# Patient Record
Sex: MALE | URBAN - METROPOLITAN AREA
[De-identification: names, ages, dates, MRNs, and addresses within clinical notes are randomized per-mention and may not be internally consistent; named-entity substitution may affect disease eponyms.]

---

## 2018-11-30 ENCOUNTER — APPOINTMENT (RX ONLY)
Dept: URBAN - METROPOLITAN AREA CLINIC 35 | Facility: CLINIC | Age: 58
Setting detail: DERMATOLOGY
End: 2018-11-30

## 2018-11-30 DIAGNOSIS — D18.0 HEMANGIOMA: ICD-10-CM

## 2018-11-30 DIAGNOSIS — L57.0 ACTINIC KERATOSIS: ICD-10-CM

## 2018-11-30 DIAGNOSIS — Z71.89 OTHER SPECIFIED COUNSELING: ICD-10-CM

## 2018-11-30 DIAGNOSIS — L82.1 OTHER SEBORRHEIC KERATOSIS: ICD-10-CM

## 2018-11-30 DIAGNOSIS — L81.4 OTHER MELANIN HYPERPIGMENTATION: ICD-10-CM

## 2018-11-30 DIAGNOSIS — D22 MELANOCYTIC NEVI: ICD-10-CM

## 2018-11-30 PROBLEM — D18.01 HEMANGIOMA OF SKIN AND SUBCUTANEOUS TISSUE: Status: ACTIVE | Noted: 2018-11-30

## 2018-11-30 PROBLEM — J30.1 ALLERGIC RHINITIS DUE TO POLLEN: Status: ACTIVE | Noted: 2018-11-30

## 2018-11-30 PROBLEM — D22.61 MELANOCYTIC NEVI OF RIGHT UPPER LIMB, INCLUDING SHOULDER: Status: ACTIVE | Noted: 2018-11-30

## 2018-11-30 PROCEDURE — ? LIQUID NITROGEN

## 2018-11-30 PROCEDURE — ? COUNSELING

## 2018-11-30 PROCEDURE — 99213 OFFICE O/P EST LOW 20 MIN: CPT

## 2018-11-30 ASSESSMENT — LOCATION ZONE DERM
LOCATION ZONE: ARM
LOCATION ZONE: FACE

## 2018-11-30 ASSESSMENT — LOCATION DETAILED DESCRIPTION DERM
LOCATION DETAILED: LEFT CENTRAL EYEBROW
LOCATION DETAILED: RIGHT POSTERIOR SHOULDER
LOCATION DETAILED: RIGHT PROXIMAL LATERAL POSTERIOR UPPER ARM
LOCATION DETAILED: LEFT LATERAL FOREHEAD
LOCATION DETAILED: RIGHT SUPERIOR CENTRAL BUCCAL CHEEK
LOCATION DETAILED: RIGHT PROXIMAL POSTERIOR UPPER ARM

## 2018-11-30 ASSESSMENT — LOCATION SIMPLE DESCRIPTION DERM
LOCATION SIMPLE: LEFT EYEBROW
LOCATION SIMPLE: RIGHT SHOULDER
LOCATION SIMPLE: LEFT FOREHEAD
LOCATION SIMPLE: RIGHT UPPER ARM
LOCATION SIMPLE: RIGHT CHEEK

## 2018-11-30 NOTE — HPI: EVALUATION OF SKIN LESION(S)
How Severe Are Your Spot(S)?: mild
Have Your Spot(S) Been Treated In The Past?: has been treated
Hpi Title: Evaluation of Skin Lesions
When Was It Treated?: 11/2017
Family Member: Father, grandmother

## 2018-11-30 NOTE — PROCEDURE: LIQUID NITROGEN
Render Post-Care Instructions In Note?: no
Consent: The patient's consent was obtained including but not limited to risks of crusting, scabbing, blistering, scarring, darker or lighter pigmentary change, recurrence, incomplete removal and infection.
Post-Care Instructions: I reviewed with the patient in detail post-care instructions. Patient is to wear sunprotection, and avoid picking at any of the treated lesions. Pt may apply Vaseline to crusted or scabbing areas.
Detail Level: Detailed
Duration Of Freeze Thaw-Cycle (Seconds): 10

## 2019-12-02 ENCOUNTER — RX ONLY (OUTPATIENT)
Age: 59
Setting detail: RX ONLY
End: 2019-12-02

## 2019-12-02 ENCOUNTER — APPOINTMENT (RX ONLY)
Dept: URBAN - METROPOLITAN AREA CLINIC 35 | Facility: CLINIC | Age: 59
Setting detail: DERMATOLOGY
End: 2019-12-02

## 2019-12-02 DIAGNOSIS — L81.4 OTHER MELANIN HYPERPIGMENTATION: ICD-10-CM

## 2019-12-02 DIAGNOSIS — D485 NEOPLASM OF UNCERTAIN BEHAVIOR OF SKIN: ICD-10-CM

## 2019-12-02 DIAGNOSIS — L82.1 OTHER SEBORRHEIC KERATOSIS: ICD-10-CM

## 2019-12-02 DIAGNOSIS — L57.0 ACTINIC KERATOSIS: ICD-10-CM

## 2019-12-02 DIAGNOSIS — Z71.89 OTHER SPECIFIED COUNSELING: ICD-10-CM

## 2019-12-02 DIAGNOSIS — D22 MELANOCYTIC NEVI: ICD-10-CM

## 2019-12-02 PROBLEM — D48.5 NEOPLASM OF UNCERTAIN BEHAVIOR OF SKIN: Status: ACTIVE | Noted: 2019-12-02

## 2019-12-02 PROBLEM — D22.5 MELANOCYTIC NEVI OF TRUNK: Status: ACTIVE | Noted: 2019-12-02

## 2019-12-02 PROCEDURE — 99213 OFFICE O/P EST LOW 20 MIN: CPT | Mod: 25

## 2019-12-02 PROCEDURE — 11102 TANGNTL BX SKIN SINGLE LES: CPT

## 2019-12-02 PROCEDURE — ? BIOPSY BY SHAVE METHOD

## 2019-12-02 PROCEDURE — ? COUNSELING

## 2019-12-02 PROCEDURE — ? TREATMENT REGIMEN

## 2019-12-02 PROCEDURE — ? PRESCRIPTION

## 2019-12-02 RX ORDER — FLUOROURACIL 5 MG/G
THIN LAYER CREAM TOPICAL BID
Qty: 1 | Refills: 0 | Status: ERX

## 2019-12-02 RX ORDER — CALCIPOTRIENE 0.05 MG/G
OINTMENT TOPICAL BID
Qty: 1 | Refills: 0 | Status: ERX

## 2019-12-02 ASSESSMENT — LOCATION SIMPLE DESCRIPTION DERM
LOCATION SIMPLE: LOWER BACK
LOCATION SIMPLE: RIGHT PRETIBIAL REGION
LOCATION SIMPLE: LEFT UPPER BACK
LOCATION SIMPLE: LEFT SHOULDER
LOCATION SIMPLE: LEFT PRETIBIAL REGION
LOCATION SIMPLE: LEFT CHEEK
LOCATION SIMPLE: LEFT FOREARM
LOCATION SIMPLE: RIGHT CHEEK
LOCATION SIMPLE: RIGHT SHOULDER
LOCATION SIMPLE: ABDOMEN
LOCATION SIMPLE: RIGHT FOREARM

## 2019-12-02 ASSESSMENT — LOCATION DETAILED DESCRIPTION DERM
LOCATION DETAILED: LEFT CENTRAL MALAR CHEEK
LOCATION DETAILED: LEFT MEDIAL UPPER BACK
LOCATION DETAILED: RIGHT LATERAL SHOULDER
LOCATION DETAILED: RIGHT DISTAL PRETIBIAL REGION
LOCATION DETAILED: LEFT DISTAL PRETIBIAL REGION
LOCATION DETAILED: LEFT POSTERIOR SHOULDER
LOCATION DETAILED: LEFT PROXIMAL DORSAL FOREARM
LOCATION DETAILED: RIGHT PROXIMAL DORSAL FOREARM
LOCATION DETAILED: EPIGASTRIC SKIN
LOCATION DETAILED: SUPERIOR LUMBAR SPINE
LOCATION DETAILED: RIGHT LATERAL MALAR CHEEK
LOCATION DETAILED: RIGHT POSTERIOR SHOULDER

## 2019-12-02 ASSESSMENT — LOCATION ZONE DERM
LOCATION ZONE: ARM
LOCATION ZONE: FACE
LOCATION ZONE: LEG
LOCATION ZONE: TRUNK

## 2019-12-02 NOTE — PROCEDURE: TREATMENT REGIMEN
Initiate Treatment: Fluorouracil 5% BID on the face for 4 days then on forearms and back of the hands\\nCalcipotriene 0.005% ointment BID for 4 days on top of fluorouracil
Detail Level: Zone

## 2019-12-02 NOTE — PROCEDURE: BIOPSY BY SHAVE METHOD
Destruction After The Procedure: No
Post-Care Instructions: I reviewed with the patient in detail post-care instructions. Keep the biopsy site dry overnight, and then change the dressing once daily and apply a thin layer of petrolatum with a clean q-tip. \\nShowers are OK after 24 hours.  Allow clean water to run over the area.  Do not touch the biopsy site with your hands.  Do not immerse the biopsy site in water such as going into a swimming pool or bathtub until the sutures are removed.  If the biopsy site gets more painful with time, red, or drains, this is concerning for infection.  If you have signs of infection please call the office to come in for a walk in visit Monday through Friday 8:30 am to 12 pm or 1 pm to 4:30 pm.  If we are not in the office, please call through the answering service.
Size Of Lesion In Cm: 0.5
Anesthesia Type: 0.5% lidocaine with 1:200,000 epinephrine and a 1:10 solution of 8.4% sodium bicarbonate
Billing Type: Third-Party Bill
Curettage Text: The wound bed was treated with curettage after the biopsy was performed.
Lab: [id number]
Notification Instructions: Patient will be notified of biopsy results. However, patient instructed to call the office if not contacted within 2 weeks.
Biopsy Type: H and E
Electrodesiccation Text: The wound bed was treated with electrodesiccation after the biopsy was performed.
Depth Of Biopsy: dermis
Additional Anesthesia Volume In Cc (Will Not Render If 0): 0
Cryotherapy Text: The wound bed was treated with cryotherapy after the biopsy was performed.
Lab Facility: 
Wound Care: Petrolatum
Type Of Destruction Used: Curettage
Electrodesiccation And Curettage Text: The wound bed was treated with electrodesiccation and curettage after the biopsy was performed.
Was A Bandage Applied: Yes
Dressing: bandage
Consent: Written consent was obtained and risks were reviewed including but not limited to scarring, infection, bleeding, scabbing, incomplete removal, nerve damage and allergy to anesthesia.
Hemostasis: Aluminum Chloride
Silver Nitrate Text: The wound bed was treated with silver nitrate after the biopsy was performed.
Detail Level: Detailed
Biopsy Method: double edge Personna blade

## 2019-12-12 ENCOUNTER — APPOINTMENT (RX ONLY)
Dept: URBAN - METROPOLITAN AREA CLINIC 35 | Facility: CLINIC | Age: 59
Setting detail: DERMATOLOGY
End: 2019-12-12

## 2019-12-12 PROBLEM — C44.612 BASAL CELL CARCINOMA OF SKIN OF RIGHT UPPER LIMB, INCLUDING SHOULDER: Status: ACTIVE | Noted: 2019-12-12

## 2019-12-12 PROCEDURE — ? CURETTAGE AND DESTRUCTION

## 2019-12-12 PROCEDURE — 17262 DSTRJ MAL LES T/A/L 1.1-2.0: CPT

## 2019-12-12 NOTE — PROCEDURE: CURETTAGE AND DESTRUCTION
Anesthesia Volume In Cc: 1
Bill For Surgical Tray: no
Anesthesia Type: 1% lidocaine with epinephrine and a 1:10 solution of 8.4% sodium bicarbonate
Biopsy Photograph Reviewed: Yes
Size Of Lesion After Curettage: 1.2
Post-Care Instructions: I reviewed with the patient in detail post-care instructions. Keep the biopsy site dry overnight, and then change the dressing once daily and apply a thin layer of petrolatum with a clean q-tip. \\nShowers are OK after 24 hours.  Allow clean water to run over the area.  Do not touch the biopsy site with your hands.  Do not immerse the biopsy site in water such as going into a swimming pool or bathtub until the sutures are removed.  If the biopsy site gets more painful with time, red, or drains, this is concerning for infection.  If you have signs of infection please call the office to come in for a walk in visit Monday through Friday 8:30 am to 12 pm or 1 pm to 4:30 pm.  If we are not in the office, please call through the answering service.
Concentration (Mg/Ml Or Millions Of Plaque Forming Units/Cc): 0.01
Bill As A Line Item Or As Units: Line Item
Consent was obtained from the patient. The risks, benefits and alternatives to therapy were discussed in detail. Specifically, the risks of infection, scarring, bleeding, prolonged wound healing, nerve injury, incomplete removal, allergy to anesthesia and recurrence were addressed. Alternatives to ED&C, such as: surgical removal were also discussed.  Prior to the procedure, the treatment site was clearly identified and confirmed by the patient. All components of Universal Protocol/PAUSE Rule completed.
Additional Information: (Optional): The wound was cleaned, and a pressure dressing was applied.  The patient received detailed post-op instructions.
What Was Performed First?: Curettage
Detail Level: Detailed
Number Of Curettages: 3
Cautery Type: aluminum chloride
Size Of Lesion In Cm: 0.5

## 2020-06-04 ENCOUNTER — APPOINTMENT (RX ONLY)
Dept: URBAN - METROPOLITAN AREA CLINIC 35 | Facility: CLINIC | Age: 60
Setting detail: DERMATOLOGY
End: 2020-06-04

## 2020-06-04 DIAGNOSIS — L81.4 OTHER MELANIN HYPERPIGMENTATION: ICD-10-CM

## 2020-06-04 DIAGNOSIS — Z85.828 PERSONAL HISTORY OF OTHER MALIGNANT NEOPLASM OF SKIN: ICD-10-CM

## 2020-06-04 DIAGNOSIS — L82.1 OTHER SEBORRHEIC KERATOSIS: ICD-10-CM

## 2020-06-04 DIAGNOSIS — Z71.89 OTHER SPECIFIED COUNSELING: ICD-10-CM

## 2020-06-04 DIAGNOSIS — D18.0 HEMANGIOMA: ICD-10-CM

## 2020-06-04 DIAGNOSIS — D22 MELANOCYTIC NEVI: ICD-10-CM

## 2020-06-04 PROBLEM — D22.5 MELANOCYTIC NEVI OF TRUNK: Status: ACTIVE | Noted: 2020-06-04

## 2020-06-04 PROBLEM — D18.01 HEMANGIOMA OF SKIN AND SUBCUTANEOUS TISSUE: Status: ACTIVE | Noted: 2020-06-04

## 2020-06-04 PROCEDURE — ? COUNSELING

## 2020-06-04 PROCEDURE — 99213 OFFICE O/P EST LOW 20 MIN: CPT

## 2020-06-04 ASSESSMENT — LOCATION SIMPLE DESCRIPTION DERM
LOCATION SIMPLE: RIGHT PRETIBIAL REGION
LOCATION SIMPLE: LEFT UPPER BACK
LOCATION SIMPLE: CHEST
LOCATION SIMPLE: LEFT SHOULDER
LOCATION SIMPLE: ABDOMEN
LOCATION SIMPLE: LEFT PRETIBIAL REGION
LOCATION SIMPLE: LOWER BACK
LOCATION SIMPLE: RIGHT FOREARM
LOCATION SIMPLE: LEFT FOREARM
LOCATION SIMPLE: RIGHT SHOULDER

## 2020-06-04 ASSESSMENT — LOCATION DETAILED DESCRIPTION DERM
LOCATION DETAILED: RIGHT LATERAL SHOULDER
LOCATION DETAILED: LEFT DISTAL PRETIBIAL REGION
LOCATION DETAILED: LEFT MEDIAL INFERIOR CHEST
LOCATION DETAILED: SUPERIOR LUMBAR SPINE
LOCATION DETAILED: PERIUMBILICAL SKIN
LOCATION DETAILED: RIGHT DISTAL PRETIBIAL REGION
LOCATION DETAILED: LEFT PROXIMAL DORSAL FOREARM
LOCATION DETAILED: LEFT MEDIAL UPPER BACK
LOCATION DETAILED: LEFT POSTERIOR SHOULDER
LOCATION DETAILED: RIGHT PROXIMAL DORSAL FOREARM
LOCATION DETAILED: RIGHT POSTERIOR SHOULDER
LOCATION DETAILED: EPIGASTRIC SKIN

## 2020-06-04 ASSESSMENT — LOCATION ZONE DERM
LOCATION ZONE: ARM
LOCATION ZONE: TRUNK
LOCATION ZONE: LEG

## 2020-12-03 ENCOUNTER — APPOINTMENT (RX ONLY)
Dept: URBAN - METROPOLITAN AREA CLINIC 35 | Facility: CLINIC | Age: 60
Setting detail: DERMATOLOGY
End: 2020-12-03

## 2020-12-03 DIAGNOSIS — D18.0 HEMANGIOMA: ICD-10-CM

## 2020-12-03 DIAGNOSIS — Z71.89 OTHER SPECIFIED COUNSELING: ICD-10-CM

## 2020-12-03 DIAGNOSIS — D22 MELANOCYTIC NEVI: ICD-10-CM

## 2020-12-03 DIAGNOSIS — L82.1 OTHER SEBORRHEIC KERATOSIS: ICD-10-CM

## 2020-12-03 DIAGNOSIS — L81.4 OTHER MELANIN HYPERPIGMENTATION: ICD-10-CM

## 2020-12-03 DIAGNOSIS — Z85.828 PERSONAL HISTORY OF OTHER MALIGNANT NEOPLASM OF SKIN: ICD-10-CM

## 2020-12-03 PROBLEM — D22.5 MELANOCYTIC NEVI OF TRUNK: Status: ACTIVE | Noted: 2020-12-03

## 2020-12-03 PROBLEM — D18.01 HEMANGIOMA OF SKIN AND SUBCUTANEOUS TISSUE: Status: ACTIVE | Noted: 2020-12-03

## 2020-12-03 PROCEDURE — 99213 OFFICE O/P EST LOW 20 MIN: CPT

## 2020-12-03 PROCEDURE — ? COUNSELING

## 2020-12-03 ASSESSMENT — LOCATION SIMPLE DESCRIPTION DERM
LOCATION SIMPLE: LEFT UPPER BACK
LOCATION SIMPLE: LEFT SHOULDER
LOCATION SIMPLE: LOWER BACK
LOCATION SIMPLE: CHEST
LOCATION SIMPLE: ABDOMEN
LOCATION SIMPLE: RIGHT SHOULDER

## 2020-12-03 ASSESSMENT — LOCATION DETAILED DESCRIPTION DERM
LOCATION DETAILED: LEFT MEDIAL INFERIOR CHEST
LOCATION DETAILED: LEFT POSTERIOR SHOULDER
LOCATION DETAILED: EPIGASTRIC SKIN
LOCATION DETAILED: SUPERIOR LUMBAR SPINE
LOCATION DETAILED: PERIUMBILICAL SKIN
LOCATION DETAILED: LEFT MEDIAL UPPER BACK
LOCATION DETAILED: RIGHT LATERAL SHOULDER

## 2020-12-03 ASSESSMENT — LOCATION ZONE DERM
LOCATION ZONE: TRUNK
LOCATION ZONE: ARM

## 2021-04-05 ENCOUNTER — APPOINTMENT (OUTPATIENT)
Dept: RADIOLOGY | Facility: MEDICAL CENTER | Age: 61
End: 2021-04-05
Attending: EMERGENCY MEDICINE
Payer: OTHER MISCELLANEOUS

## 2021-04-05 ENCOUNTER — HOSPITAL ENCOUNTER (EMERGENCY)
Facility: MEDICAL CENTER | Age: 61
End: 2021-04-05
Attending: EMERGENCY MEDICINE
Payer: OTHER MISCELLANEOUS

## 2021-04-05 VITALS
SYSTOLIC BLOOD PRESSURE: 121 MMHG | RESPIRATION RATE: 16 BRPM | TEMPERATURE: 98.6 F | HEART RATE: 71 BPM | BODY MASS INDEX: 34.94 KG/M2 | OXYGEN SATURATION: 94 % | HEIGHT: 70 IN | DIASTOLIC BLOOD PRESSURE: 62 MMHG | WEIGHT: 244.05 LBS

## 2021-04-05 DIAGNOSIS — U07.1 PNEUMONIA DUE TO COVID-19 VIRUS: ICD-10-CM

## 2021-04-05 DIAGNOSIS — J12.82 PNEUMONIA DUE TO COVID-19 VIRUS: ICD-10-CM

## 2021-04-05 LAB
ALBUMIN SERPL BCP-MCNC: 4.2 G/DL (ref 3.2–4.9)
ALBUMIN/GLOB SERPL: 1.3 G/DL
ALP SERPL-CCNC: 73 U/L (ref 30–99)
ALT SERPL-CCNC: 29 U/L (ref 2–50)
ANION GAP SERPL CALC-SCNC: 10 MMOL/L (ref 7–16)
AST SERPL-CCNC: 25 U/L (ref 12–45)
BASOPHILS # BLD AUTO: 0.2 % (ref 0–1.8)
BASOPHILS # BLD: 0.01 K/UL (ref 0–0.12)
BILIRUB SERPL-MCNC: 0.6 MG/DL (ref 0.1–1.5)
BUN SERPL-MCNC: 12 MG/DL (ref 8–22)
CALCIUM SERPL-MCNC: 8.9 MG/DL (ref 8.5–10.5)
CHLORIDE SERPL-SCNC: 94 MMOL/L (ref 96–112)
CO2 SERPL-SCNC: 28 MMOL/L (ref 20–33)
CREAT SERPL-MCNC: 0.74 MG/DL (ref 0.5–1.4)
EOSINOPHIL # BLD AUTO: 0 K/UL (ref 0–0.51)
EOSINOPHIL NFR BLD: 0 % (ref 0–6.9)
ERYTHROCYTE [DISTWIDTH] IN BLOOD BY AUTOMATED COUNT: 41.5 FL (ref 35.9–50)
FLUAV RNA SPEC QL NAA+PROBE: NEGATIVE
FLUBV RNA SPEC QL NAA+PROBE: NEGATIVE
GLOBULIN SER CALC-MCNC: 3.2 G/DL (ref 1.9–3.5)
GLUCOSE SERPL-MCNC: 146 MG/DL (ref 65–99)
HCT VFR BLD AUTO: 45.4 % (ref 42–52)
HGB BLD-MCNC: 16 G/DL (ref 14–18)
IMM GRANULOCYTES # BLD AUTO: 0.02 K/UL (ref 0–0.11)
IMM GRANULOCYTES NFR BLD AUTO: 0.4 % (ref 0–0.9)
LYMPHOCYTES # BLD AUTO: 1.24 K/UL (ref 1–4.8)
LYMPHOCYTES NFR BLD: 23.9 % (ref 22–41)
MCH RBC QN AUTO: 32.9 PG (ref 27–33)
MCHC RBC AUTO-ENTMCNC: 35.2 G/DL (ref 33.7–35.3)
MCV RBC AUTO: 93.2 FL (ref 81.4–97.8)
MONOCYTES # BLD AUTO: 0.71 K/UL (ref 0–0.85)
MONOCYTES NFR BLD AUTO: 13.7 % (ref 0–13.4)
NEUTROPHILS # BLD AUTO: 3.2 K/UL (ref 1.82–7.42)
NEUTROPHILS NFR BLD: 61.8 % (ref 44–72)
NRBC # BLD AUTO: 0 K/UL
NRBC BLD-RTO: 0 /100 WBC
PLATELET # BLD AUTO: 157 K/UL (ref 164–446)
PMV BLD AUTO: 10.1 FL (ref 9–12.9)
POTASSIUM SERPL-SCNC: 4.5 MMOL/L (ref 3.6–5.5)
PROT SERPL-MCNC: 7.4 G/DL (ref 6–8.2)
RBC # BLD AUTO: 4.87 M/UL (ref 4.7–6.1)
RSV RNA SPEC QL NAA+PROBE: NEGATIVE
SARS-COV-2 RNA RESP QL NAA+PROBE: DETECTED
SODIUM SERPL-SCNC: 132 MMOL/L (ref 135–145)
SPECIMEN SOURCE: ABNORMAL
WBC # BLD AUTO: 5.2 K/UL (ref 4.8–10.8)

## 2021-04-05 PROCEDURE — 85025 COMPLETE CBC W/AUTO DIFF WBC: CPT

## 2021-04-05 PROCEDURE — 36415 COLL VENOUS BLD VENIPUNCTURE: CPT

## 2021-04-05 PROCEDURE — 0241U HCHG SARS-COV-2 COVID-19 NFCT DS RESP RNA 4 TRGT MIC: CPT

## 2021-04-05 PROCEDURE — C9803 HOPD COVID-19 SPEC COLLECT: HCPCS | Performed by: EMERGENCY MEDICINE

## 2021-04-05 PROCEDURE — 80053 COMPREHEN METABOLIC PANEL: CPT

## 2021-04-05 PROCEDURE — 99285 EMERGENCY DEPT VISIT HI MDM: CPT

## 2021-04-05 PROCEDURE — 71045 X-RAY EXAM CHEST 1 VIEW: CPT

## 2021-04-05 PROCEDURE — 96374 THER/PROPH/DIAG INJ IV PUSH: CPT

## 2021-04-05 PROCEDURE — 700111 HCHG RX REV CODE 636 W/ 250 OVERRIDE (IP): Performed by: EMERGENCY MEDICINE

## 2021-04-05 RX ORDER — LISINOPRIL 20 MG/1
20 TABLET ORAL DAILY
Status: ON HOLD | COMMUNITY
End: 2023-02-20

## 2021-04-05 RX ORDER — DEXAMETHASONE SODIUM PHOSPHATE 4 MG/ML
6 INJECTION, SOLUTION INTRA-ARTICULAR; INTRALESIONAL; INTRAMUSCULAR; INTRAVENOUS; SOFT TISSUE ONCE
Status: COMPLETED | OUTPATIENT
Start: 2021-04-05 | End: 2021-04-05

## 2021-04-05 RX ORDER — METHYLPREDNISOLONE 4 MG/1
TABLET ORAL
Qty: 1 EACH | Refills: 0 | Status: SHIPPED | OUTPATIENT
Start: 2021-04-05 | End: 2022-07-28

## 2021-04-05 RX ORDER — HYDROCHLOROTHIAZIDE 25 MG/1
50 TABLET ORAL DAILY
Status: ON HOLD | COMMUNITY
End: 2023-02-20

## 2021-04-05 RX ORDER — LOSARTAN POTASSIUM 25 MG/1
100 TABLET ORAL DAILY
Status: ON HOLD | COMMUNITY
End: 2023-02-20

## 2021-04-05 RX ADMIN — DEXAMETHASONE SODIUM PHOSPHATE 6 MG: 4 INJECTION, SOLUTION INTRA-ARTICULAR; INTRALESIONAL; INTRAMUSCULAR; INTRAVENOUS; SOFT TISSUE at 17:10

## 2021-04-05 ASSESSMENT — LIFESTYLE VARIABLES: DO YOU DRINK ALCOHOL: NO

## 2021-04-05 NOTE — ED NOTES
[facility] ambulatory with stead gait accompanied by ED Tech. Chart up for ERP.

## 2021-04-05 NOTE — ED PROVIDER NOTES
ED Provider Note    CHIEF COMPLAINT  Chief Complaint   Patient presents with   • Coronavirus Screening     pt referred to ER from [place] Covid-19 clinic; pt reports fevers, body aches, dry cough, and fatigue x 10 days; per pt they did not have the ability to test him for covid-19 so sent him here       HPI  Jayce Espino is a 60 y.o. male who presents for evaluation of 7 to 8 days of persistent cough body aches dry cough mild fatigue.  The patient lives in [place].  He was exposed to an individual who had similar symptoms last week.  Neither of these individuals have been tested for COVID-19.  The patient presented to a small clinic in [place] they apparently did a rapid antigen test which was negative.  They did advise him to go to the closest emergency department.  He decided to come here.  He has a history of hypertension.  He denies high fevers hemoptysis or chest pain.  No associated leg swelling or numbness tingling weakness vomiting or diarrhea    REVIEW OF SYSTEMS  See HPI for further details.  No night sweats weight loss numbness tingling weakness rash all other systems are negative.     PAST MEDICAL HISTORY  Past Medical History:   Diagnosis Date   • Asthma        FAMILY HISTORY  Noncontributory    SOCIAL HISTORY  Social History     Socioeconomic History   • Marital status: Not on file     Spouse name: Not on file   • Number of children: Not on file   • Years of education: Not on file   • Highest education level: Not on file   Occupational History   • Not on file   Tobacco Use   • Smoking status: Never Smoker   • Smokeless tobacco: Never Used   Substance and Sexual Activity   • Alcohol use: Never   • Drug use: Never   • Sexual activity: Not on file   Other Topics Concern   • Not on file   Social History Narrative   • Not on file     Social Determinants of Health     Financial Resource Strain:    • Difficulty of Paying Living Expenses:    Food Insecurity:    • Worried About Running Out of  "Food in the Last Year:    • Ran Out of Food in the Last Year:    Transportation Needs:    • Lack of Transportation (Medical):    • Lack of Transportation (Non-Medical):    Physical Activity:    • Days of Exercise per Week:    • Minutes of Exercise per Session:    Stress:    • Feeling of Stress :    Social Connections:    • Frequency of Communication with Friends and Family:    • Frequency of Social Gatherings with Friends and Family:    • Attends [employer]ae[employer] Services:    • Active Member of Clubs or Organizations:    • Attends Club or Organization Meetings:    • Marital Status:    Intimate Partner Violence:    • Fear of Current or Ex-Partner:    • Emotionally Abused:    • Physically Abused:    • Sexually Abused:        SURGICAL HISTORY  Past Surgical History:   Procedure Laterality Date   • INGUINAL HERNIA REPAIR     • OTHER NEUROLOGICAL SURG      L4-S1 fusion       CURRENT MEDICATIONS  Home Medications     Reviewed by Maite Zuñiga R.N. (Registered Nurse) on 04/05/21 at 1332  Med List Status: Not Addressed   Medication Last Dose Status   hydroCHLOROthiazide (HYDRODIURIL) 25 MG Tab  Active   lisinopril (PRINIVIL) 20 MG Tab  Active   losartan (COZAAR) 25 MG Tab  Active                ALLERGIES  No Known Allergies    PHYSICAL EXAM  VITAL SIGNS: /68   Pulse 72   Temp 36.7 °C (98.1 °F) (Temporal)   Resp 20   Ht 1.778 m (5' 10\")   Wt 111 kg (244 lb 0.8 oz)   SpO2 95%   BMI 35.02 kg/m²       Constitutional: Well developed, Well nourished, No acute distress, Non-toxic appearance.   HENT: Normocephalic, Atraumatic, Bilateral external ears normal, Oropharynx moist, No oral exudates, Nose normal.   Eyes: PERRLA, EOMI, Conjunctiva normal, No discharge.   Neck: Normal range of motion, No tenderness, Supple, No stridor.   Cardiovascular: Normal heart rate, Normal rhythm, No murmurs, No rubs, No gallops.   Thorax & Lungs: Mild bibasilar rhonchi  Abdomen: Bowel sounds normal, Soft, No tenderness, No masses, No " pulsatile masses.   Skin: Warm, Dry, No erythema, No rash.   Back: No tenderness, No CVA tenderness.   Extremities: Intact distal pulses, No edema, No tenderness, No cyanosis, No clubbing.   Neurologic: Alert & oriented x 3, Normal motor function, Normal sensory function, No focal deficits noted.   Psychiatric: Affect normal, Judgment normal, Mood normal.     DX-CHEST-PORTABLE (1 VIEW)   Final Result      Mild left basilar opacity could represent atelectasis or mild pneumonitis.        Results for orders placed or performed during the hospital encounter of 04/05/21   CBC WITH DIFFERENTIAL   Result Value Ref Range    WBC 5.2 4.8 - 10.8 K/uL    RBC 4.87 4.70 - 6.10 M/uL    Hemoglobin 16.0 14.0 - 18.0 g/dL    Hematocrit 45.4 42.0 - 52.0 %    MCV 93.2 81.4 - 97.8 fL    MCH 32.9 27.0 - 33.0 pg    MCHC 35.2 33.7 - 35.3 g/dL    RDW 41.5 35.9 - 50.0 fL    Platelet Count 157 (L) 164 - 446 K/uL    MPV 10.1 9.0 - 12.9 fL    Neutrophils-Polys 61.80 44.00 - 72.00 %    Lymphocytes 23.90 22.00 - 41.00 %    Monocytes 13.70 (H) 0.00 - 13.40 %    Eosinophils 0.00 0.00 - 6.90 %    Basophils 0.20 0.00 - 1.80 %    Immature Granulocytes 0.40 0.00 - 0.90 %    Nucleated RBC 0.00 /100 WBC    Neutrophils (Absolute) 3.20 1.82 - 7.42 K/uL    Lymphs (Absolute) 1.24 1.00 - 4.80 K/uL    Monos (Absolute) 0.71 0.00 - 0.85 K/uL    Eos (Absolute) 0.00 0.00 - 0.51 K/uL    Baso (Absolute) 0.01 0.00 - 0.12 K/uL    Immature Granulocytes (abs) 0.02 0.00 - 0.11 K/uL    NRBC (Absolute) 0.00 K/uL   Comp Metabolic Panel   Result Value Ref Range    Sodium 132 (L) 135 - 145 mmol/L    Potassium 4.5 3.6 - 5.5 mmol/L    Chloride 94 (L) 96 - 112 mmol/L    Co2 28 20 - 33 mmol/L    Anion Gap 10.0 7.0 - 16.0    Glucose 146 (H) 65 - 99 mg/dL    Bun 12 8 - 22 mg/dL    Creatinine 0.74 0.50 - 1.40 mg/dL    Calcium 8.9 8.5 - 10.5 mg/dL    AST(SGOT) 25 12 - 45 U/L    ALT(SGPT) 29 2 - 50 U/L    Alkaline Phosphatase 73 30 - 99 U/L    Total Bilirubin 0.6 0.1 - 1.5 mg/dL     Albumin 4.2 3.2 - 4.9 g/dL    Total Protein 7.4 6.0 - 8.2 g/dL    Globulin 3.2 1.9 - 3.5 g/dL    A-G Ratio 1.3 g/dL   COV-2, FLU A/B, AND RSV BY PCR (2-4 HOURS CEPHEID): Collect NP swab in VTM    Specimen: Nasopharyngeal; Respirate   Result Value Ref Range    Influenza virus A RNA Negative Negative    Influenza virus B, PCR Negative Negative    RSV, PCR Negative Negative    SARS-CoV-2 by PCR DETECTED (AA)     SARS-CoV-2 Source NP Swab    ESTIMATED GFR   Result Value Ref Range    GFR If African American >60 >60 mL/min/1.73 m 2    GFR If Non African American >60 >60 mL/min/1.73 m 2      COURSE & MEDICAL DECISION MAKING  Pertinent Labs & Imaging studies reviewed. (See chart for details)  Patient presents here with classic cough URI some mild body aches.  He has history of hypertension and is 60 years old but does not have any increased work of breathing or significant pneumonitis on chest x-ray.  Laboratory studies are reassuring.  COVID-19 is confirmed on a rapid swab.  The patient does not appear clinically toxic.  He is able to ambulate without any hypoxia or severe dyspnea.  Counseled the patient that he will do well as an outpatient.  If he develops any new or worsening symptoms such as increasing cough shortness of breath chest pain he should seek immediate medical attention    FINAL IMPRESSION  1.  COVID-19 pneumonitis, uncomplicated         Electronically signed by: James Kwan M.D., 4/5/2021 2:31 PM

## 2021-04-05 NOTE — ED NOTES
Patient ambulatory in hallway with ED Tech on room air with continuous pulse oximetry in place. Ambulatory x 150 feet in hallway with oxygen saturation no lower than 94%. Ambulatory with steady gait. Minimal dyspnea noted with ambulation. + dry cough present.

## 2021-04-05 NOTE — ED NOTES
IV access placed and blood rainbow drawn and sent to lab. COVID swab collected per orders and sent to lab. Patient tolerated well with no complaints of pain/discomfort. Assisted into position of comfort on gurney. Denies needs. Call bell within reach.

## 2021-04-06 NOTE — DISCHARGE INSTRUCTIONS
COVID-19  COVID-19 is a respiratory infection that is caused by a virus called severe acute respiratory syndrome coronavirus 2 (SARS-CoV-2). The disease is also known as coronavirus disease or novel coronavirus. In some people, the virus may not cause any symptoms. In others, it may cause a serious infection. The infection can get worse quickly and can lead to complications, such as:  · Pneumonia, or infection of the lungs.  · Acute respiratory distress syndrome or ARDS. This is fluid build-up in the lungs.  · Acute respiratory failure. This is a condition in which there is not enough oxygen passing from the lungs to the body.  · Sepsis or septic shock. This is a serious bodily reaction to an infection.  · Blood clotting problems.  · Secondary infections due to bacteria or fungus.  The virus that causes COVID-19 is contagious. This means that it can spread from person to person through droplets from coughs and sneezes (respiratory secretions).  What are the causes?  This illness is caused by a virus. You may catch the virus by:  · Breathing in droplets from an infected person's cough or sneeze.  · Touching something, like a table or a doorknob, that was exposed to the virus (contaminated) and then touching your mouth, nose, or eyes.  What increases the risk?  Risk for infection  You are more likely to be infected with this virus if you:  · Live in or travel to an area with a COVID-19 outbreak.  · Come in contact with a sick person who recently traveled to an area with a COVID-19 outbreak.  · Provide care for or live with a person who is infected with COVID-19.  Risk for serious illness  You are more likely to become seriously ill from the virus if you:  · Are 65 years of age or older.  · Have a long-term disease that lowers your body's ability to fight infection (immunocompromised).  · Live in a nursing home or long-term care facility.  · Have a long-term (chronic) disease such as:  ? Chronic lung disease, including  chronic obstructive pulmonary disease or asthma  ? Heart disease.  ? Diabetes.  ? Chronic kidney disease.  ? Liver disease.  · Are obese.  What are the signs or symptoms?  Symptoms of this condition can range from mild to severe. Symptoms may appear any time from 2 to 14 days after being exposed to the virus. They include:  · A fever.  · A cough.  · Difficulty breathing.  · Chills.  · Muscle pains.  · A sore throat.  · Loss of taste or smell.  Some people may also have stomach problems, such as nausea, vomiting, or diarrhea.  Other people may not have any symptoms of COVID-19.  How is this diagnosed?  This condition may be diagnosed based on:  · Your signs and symptoms, especially if:  ? You live in an area with a COVID-19 outbreak.  ? You recently traveled to or from an area where the virus is common.  ? You provide care for or live with a person who was diagnosed with COVID-19.  · A physical exam.  · Lab tests, which may include:  ? A nasal swab to take a sample of fluid from your nose.  ? A throat swab to take a sample of fluid from your throat.  ? A sample of mucus from your lungs (sputum).  ? Blood tests.  · Imaging tests, which may include, X-rays, CT scan, or ultrasound.  How is this treated?  At present, there is no medicine to treat COVID-19. Medicines that treat other diseases are being used on a trial basis to see if they are effective against COVID-19.  Your health care provider will talk with you about ways to treat your symptoms. For most people, the infection is mild and can be managed at home with rest, fluids, and over-the-counter medicines.  Treatment for a serious infection usually takes places in a hospital intensive care unit (ICU). It may include one or more of the following treatments. These treatments are given until your symptoms improve.  · Receiving fluids and medicines through an IV.  · Supplemental oxygen. Extra oxygen is given through a tube in the nose, a face mask, or a  elise.  · Positioning you to lie on your stomach (prone position). This makes it easier for oxygen to get into the lungs.  · Continuous positive airway pressure (CPAP) or bi-level positive airway pressure (BPAP) machine. This treatment uses mild air pressure to keep the airways open. A tube that is connected to a motor delivers oxygen to the body.  · Ventilator. This treatment moves air into and out of the lungs by using a tube that is placed in your windpipe.  · Tracheostomy. This is a procedure to create a hole in the neck so that a breathing tube can be inserted.  · Extracorporeal membrane oxygenation (ECMO). This procedure gives the lungs a chance to recover by taking over the functions of the heart and lungs. It supplies oxygen to the body and removes carbon dioxide.  Follow these instructions at home:  Lifestyle  · If you are sick, stay home except to get medical care. Your health care provider will tell you how long to stay home. Call your health care provider before you go for medical care.  · Rest at home as told by your health care provider.  · Do not use any products that contain nicotine or tobacco, such as cigarettes, e-cigarettes, and chewing tobacco. If you need help quitting, ask your health care provider.  · Return to your normal activities as told by your health care provider. Ask your health care provider what activities are safe for you.  General instructions  · Take over-the-counter and prescription medicines only as told by your health care provider.  · Drink enough fluid to keep your urine pale yellow.  · Keep all follow-up visits as told by your health care provider. This is important.  How is this prevented?    There is no vaccine to help prevent COVID-19 infection. However, there are steps you can take to protect yourself and others from this virus.  To protect yourself:   · Do not travel to areas where COVID-19 is a risk. The areas where COVID-19 is reported change often. To identify  high-risk areas and travel restrictions, check the [facility] travel website: wwwnc.cdc.gov/travel/notices  · If you live in, or must travel to, an area where COVID-19 is a risk, take precautions to avoid infection.  ? Stay away from people who are sick.  ? Wash your hands often with soap and water for 20 seconds. If soap and water are not available, use an alcohol-based hand .  ? Avoid touching your mouth, face, eyes, or nose.  ? Avoid going out in public, follow guidance from your state and local health authorities.  ? If you must go out in public, wear a cloth face covering or face mask.  ? Disinfect objects and surfaces that are frequently touched every day. This may include:  § Counters and tables.  § Doorknobs and light switches.  § Sinks and faucets.  § Electronics, such as phones, remote controls, keyboards, computers, and tablets.  To protect others:  If you have symptoms of COVID-19, take steps to prevent the virus from spreading to others.  · If you think you have a COVID-19 infection, contact your health care provider right away. Tell your health care team that you think you may have a COVID-19 infection.  · Stay home. Leave your house only to seek medical care. Do not use public transport.  · Do not travel while you are sick.  · Wash your hands often with soap and water for 20 seconds. If soap and water are not available, use alcohol-based hand .  · Stay away from other members of your household. Let healthy household members care for children and pets, if possible. If you have to care for children or pets, wash your hands often and wear a mask. If possible, stay in your own room, separate from others. Use a different bathroom.  · Make sure that all people in your household wash their hands well and often.  · Cough or sneeze into a tissue or your sleeve or elbow. Do not cough or sneeze into your hand or into the air.  · Wear a cloth face covering or face mask.  Where to find more  information  · Centers for Disease Control and Prevention: www.cdc.gov/coronavirus/2019-ncov/index.html  · World Health Organization: www.who.int/health-topics/coronavirus  Contact a health care provider if:  · You live in or have traveled to an area where COVID-19 is a risk and you have symptoms of the infection.  · You have had contact with someone who has COVID-19 and you have symptoms of the infection.  Get help right away if:  · You have trouble breathing.  · You have pain or pressure in your chest.  · You have confusion.  · You have bluish lips and fingernails.  · You have difficulty waking from sleep.  · You have symptoms that get worse.  These symptoms may represent a serious problem that is an emergency. Do not wait to see if the symptoms will go away. Get medical help right away. Call your local emergency services (911 in the [place] Do not drive yourself to the hospital. Let the emergency medical personnel know if you think you have COVID-19.  Summary  · COVID-19 is a respiratory infection that is caused by a virus. It is also known as coronavirus disease or novel coronavirus. It can cause serious infections, such as pneumonia, acute respiratory distress syndrome, acute respiratory failure, or sepsis.  · The virus that causes COVID-19 is contagious. This means that it can spread from person to person through droplets from coughs and sneezes.  · You are more likely to develop a serious illness if you are 65 years of age or older, have a weak immunity, live in a nursing home, or have chronic disease.  · There is no medicine to treat COVID-19. Your health care provider will talk with you about ways to treat your symptoms.  · Take steps to protect yourself and others from infection. Wash your hands often and disinfect objects and surfaces that are frequently touched every day. Stay away from people who are sick and wear a mask if you are sick.  This information is not intended to replace advice given to you by  your health care provider. Make sure you discuss any questions you have with your health care provider.  Document Released: 01/23/2020 Document Revised: 05/14/2020 Document Reviewed: 01/23/2020  Elsevier Patient Education © 2020 [employer]

## 2021-04-06 NOTE — ED NOTES
Patient discharged in stable condition per orders. IV access removed - bandage applied. Wristband removed per protocol. Patient verbalized understanding of all discharge instructions. All belongings accounted for. Ambulatory to lobby with steady gait accompanied by ED RN.

## 2021-04-08 ENCOUNTER — HOSPITAL ENCOUNTER (EMERGENCY)
Facility: MEDICAL CENTER | Age: 61
End: 2021-04-09
Attending: EMERGENCY MEDICINE
Payer: OTHER MISCELLANEOUS

## 2021-04-08 ENCOUNTER — APPOINTMENT (OUTPATIENT)
Dept: RADIOLOGY | Facility: MEDICAL CENTER | Age: 61
End: 2021-04-08
Payer: OTHER MISCELLANEOUS

## 2021-04-08 DIAGNOSIS — U07.1 COVID-19 VIRUS INFECTION: ICD-10-CM

## 2021-04-08 LAB
ALBUMIN SERPL BCP-MCNC: 4.2 G/DL (ref 3.2–4.9)
ALBUMIN/GLOB SERPL: 1.2 G/DL
ALP SERPL-CCNC: 74 U/L (ref 30–99)
ALT SERPL-CCNC: 31 U/L (ref 2–50)
ANION GAP SERPL CALC-SCNC: 11 MMOL/L (ref 7–16)
AST SERPL-CCNC: 25 U/L (ref 12–45)
BASOPHILS # BLD AUTO: 0 % (ref 0–1.8)
BASOPHILS # BLD: 0 K/UL (ref 0–0.12)
BILIRUB SERPL-MCNC: 0.8 MG/DL (ref 0.1–1.5)
BUN SERPL-MCNC: 23 MG/DL (ref 8–22)
CALCIUM SERPL-MCNC: 9.1 MG/DL (ref 8.5–10.5)
CHLORIDE SERPL-SCNC: 96 MMOL/L (ref 96–112)
CO2 SERPL-SCNC: 25 MMOL/L (ref 20–33)
CREAT SERPL-MCNC: 0.87 MG/DL (ref 0.5–1.4)
EOSINOPHIL # BLD AUTO: 0 K/UL (ref 0–0.51)
EOSINOPHIL NFR BLD: 0 % (ref 0–6.9)
ERYTHROCYTE [DISTWIDTH] IN BLOOD BY AUTOMATED COUNT: 40.1 FL (ref 35.9–50)
GLOBULIN SER CALC-MCNC: 3.4 G/DL (ref 1.9–3.5)
GLUCOSE SERPL-MCNC: 161 MG/DL (ref 65–99)
HCT VFR BLD AUTO: 44.4 % (ref 42–52)
HGB BLD-MCNC: 16.3 G/DL (ref 14–18)
LYMPHOCYTES # BLD AUTO: 2.41 K/UL (ref 1–4.8)
LYMPHOCYTES NFR BLD: 33.9 % (ref 22–41)
MANUAL DIFF BLD: NORMAL
MCH RBC QN AUTO: 34.8 PG (ref 27–33)
MCHC RBC AUTO-ENTMCNC: 36.7 G/DL (ref 33.7–35.3)
MCV RBC AUTO: 94.9 FL (ref 81.4–97.8)
MONOCYTES # BLD AUTO: 0.99 K/UL (ref 0–0.85)
MONOCYTES NFR BLD AUTO: 13.9 % (ref 0–13.4)
MORPHOLOGY BLD-IMP: NORMAL
NEUTROPHILS # BLD AUTO: 3.71 K/UL (ref 1.82–7.42)
NEUTROPHILS NFR BLD: 52.2 % (ref 44–72)
NRBC # BLD AUTO: 0 K/UL
NRBC BLD-RTO: 0 /100 WBC
PLATELET # BLD AUTO: 235 K/UL (ref 164–446)
PLATELET BLD QL SMEAR: NORMAL
PMV BLD AUTO: 9.7 FL (ref 9–12.9)
POTASSIUM SERPL-SCNC: 4.6 MMOL/L (ref 3.6–5.5)
PROT SERPL-MCNC: 7.6 G/DL (ref 6–8.2)
RBC # BLD AUTO: 4.68 M/UL (ref 4.7–6.1)
RBC BLD AUTO: PRESENT
SODIUM SERPL-SCNC: 132 MMOL/L (ref 135–145)
VARIANT LYMPHS BLD QL SMEAR: NORMAL
WBC # BLD AUTO: 7.1 K/UL (ref 4.8–10.8)

## 2021-04-08 PROCEDURE — 99283 EMERGENCY DEPT VISIT LOW MDM: CPT

## 2021-04-08 PROCEDURE — 85007 BL SMEAR W/DIFF WBC COUNT: CPT

## 2021-04-08 PROCEDURE — 71045 X-RAY EXAM CHEST 1 VIEW: CPT

## 2021-04-08 PROCEDURE — 36415 COLL VENOUS BLD VENIPUNCTURE: CPT

## 2021-04-08 PROCEDURE — 80053 COMPREHEN METABOLIC PANEL: CPT

## 2021-04-08 PROCEDURE — 85027 COMPLETE CBC AUTOMATED: CPT

## 2021-04-08 ASSESSMENT — FIBROSIS 4 INDEX: FIB4 SCORE: 1.83

## 2021-04-09 VITALS
TEMPERATURE: 98.7 F | BODY MASS INDEX: 33.96 KG/M2 | WEIGHT: 237.22 LBS | RESPIRATION RATE: 16 BRPM | OXYGEN SATURATION: 98 % | SYSTOLIC BLOOD PRESSURE: 137 MMHG | DIASTOLIC BLOOD PRESSURE: 68 MMHG | HEART RATE: 71 BPM | HEIGHT: 70 IN

## 2021-04-09 NOTE — ED PROVIDER NOTES
ED Provider Note    Scribed for Michael Agustin M.D. by Jaiden Blanchard. 4/8/2021,  11:55 PM.    Means of Arrival: walk in  History obtained from: patient  History limited by: none    CHIEF COMPLAINT  Chief Complaint   Patient presents with    Shortness of Breath     Pt states at home pulse ox states 75% on room air. Pt states it fluctuates between 90-75% on room air    Coronavirus Screening     Pt tested COVID positive on 04/05       HPI  Jayce Espino is a 60 y.o. male who presents to the Emergency Department for worsening shortness of breath onset 14 days ago. He reports associated fever, cough, and generalized body aches. The patient tested COVID positive on 4/5, and comes in tonight because his at home pulse ox reported he was at 75% saturation on room air while laying down. No alleviating factors noted.    REVIEW OF SYSTEMS  CONSTITUTIONAL:  Fever.  RESPIRATORY:  Shortness of breath, cough  MUSCULOSKELETAL:  Generalized body aches.  See HPI for further details.   All other systems are negative.     PAST MEDICAL HISTORY  Past Medical History:   Diagnosis Date    Asthma     COVID-19     Pneumonia        FAMILY HISTORY  History reviewed. No pertinent family history.    SOCIAL HISTORY   reports that he has never smoked. He has never used smokeless tobacco. He reports that he does not drink alcohol and does not use drugs.    SURGICAL HISTORY  Past Surgical History:   Procedure Laterality Date    INGUINAL HERNIA REPAIR      OTHER NEUROLOGICAL SURG      L4-S1 fusion       CURRENT MEDICATIONS  Home Medications       Reviewed by Thao Duong R.N. (Registered Nurse) on 04/08/21 at 2052  Med List Status: Complete     Medication Last Dose Status   hydroCHLOROthiazide (HYDRODIURIL) 25 MG Tab  Active   lisinopril (PRINIVIL) 20 MG Tab  Active   losartan (COZAAR) 25 MG Tab  Active   methylPREDNISolone (MEDROL DOSEPAK) 4 MG Tablet Therapy Pack  Active                    ALLERGIES  No Known Allergies    PHYSICAL  "EXAM  VITAL SIGNS: /75   Pulse 74   Temp 37.3 °C (99.1 °F)   Resp (!) 24   Ht 1.778 m (5' 10\")   Wt 108 kg (237 lb 3.4 oz)   SpO2 97%   BMI 34.04 kg/m²    Gen: Alert, no acute distress  HEENT: ATNC  Eyes: PERRL, EOMI, normal conjunctiva.   Neck: trachea midline  Resp: no respiratory distress. Lungs clear bilaterally. No increased work of breathing  CV: No JVD, Regular rate and rhythm, no murmur or gallops, No pedal edema  Abd: non-distended, nontender  Ext: No deformities, no fetal edema  Psych: normal mood  Neuro: speech fluent       DIAGNOSTIC STUDIES / PROCEDURES       LABS  Labs Reviewed   CBC WITH DIFFERENTIAL - Abnormal; Notable for the following components:       Result Value    RBC 4.68 (*)     MCH 34.8 (*)     MCHC 36.7 (*)     Monocytes 13.90 (*)     Monos (Absolute) 0.99 (*)     All other components within normal limits   COMP METABOLIC PANEL - Abnormal; Notable for the following components:    Sodium 132 (*)     Glucose 161 (*)     Bun 23 (*)     All other components within normal limits   DIFFERENTIAL MANUAL   PERIPHERAL SMEAR REVIEW   PLATELET ESTIMATE   MORPHOLOGY   ESTIMATED GFR     All labs reviewed by me.    RADIOLOGY  DX-CHEST-PORTABLE (1 VIEW)   Final Result         1.  No acute cardiopulmonary disease.        The radiologist’s interpretation of all radiology studies have been reviewed by me.    COURSE & MEDICAL DECISION MAKING  Pertinent Labs & Imaging studies reviewed. (See chart for details)    11:55 PM Patient seen and examined at bedside. Ordered for labs and imaging to evaluate.    12:18 AM - Patient seen at bedside. Discussed lab and imaging results with the patient and updated them on the plan of care, including the plan for discharge. I answered all questions regarding their care and discussed strict return precautions for new or changing symptoms. Patient verbalizes understanding and agreement to this plan of care.       Medical Decision Making:  patient presents with known " COVID-19 diagnosis. X-ray demonstrates no bacterial superinfection. Patient's oxygen saturation is reassuring in the emergency department. He does report drops into the 70s, however these results spontaneously after only a few minutes. At this time, no indication for oxygen or dexamethasone. After discussion with the pharmacist, the patient is outside of the window for treatment with monoclonal antibodies. Patient is staying with friends, will not be alone. He was counseled on return precautions, anticipatory guidance.    The patient will return for new or worsening symptoms and is stable at the time of discharge.    The patient is referred to a primary physician for blood pressure management, diabetic screening, and for all other preventative health concerns.    I wore a mask and eye protection throughout the encounter.    DISPOSITION:  Patient will be discharged home in stable condition.    FOLLOW UP:  [facility], Emergency Dept  [address] [place] [address] [id number] [phone]    If symptoms worsen    Your regular doctor    Call   As needed      OUTPATIENT MEDICATIONS:  Discharge Medication List as of 4/9/2021 12:37 AM            FINAL IMPRESSION  1. COVID-19 virus infection            Jaiden ROBLES (Cyn), am scribing for, and in the presence of, Michael Agustin M.D..    Electronically signed by: Jaiden Blanchard (Cyn), 4/8/2021    Michael ROBLES M.D. personally performed the services described in this documentation, as scribed by Jaiden Blanchard in my presence, and it is both accurate and complete.    The note accurately reflects work and decisions made by me.  Michael Agustin M.D.  4/9/2021  4:03 AM      This dictation was created using voice recognition software. The accuracy of the dictation is limited to the abilities of the software. I expect there may be some errors of grammar and possibly content. The nursing notes were reviewed and certain aspects of this information  were incorporated into this note. C

## 2021-04-09 NOTE — ED TRIAGE NOTES
"Chief Complaint   Patient presents with   • Shortness of Breath     Pt states at home pulse ox states 75% on room air. Pt states it fluctuates between 90-75% on room air   • Coronavirus Screening     Pt tested COVID positive on 04/05     Pt tested POSITIVE for COVID 19 on Monday 04/05, as well as PNA. Pt has been using a pulse ox a home, c/o of difficulty breathing. Oxygen Saturation has been fluctuating between 90-75% on room air. Pt does not wear oxygen. This evening patient was laying down and oxygen sat was 75% on room air. Pt was prescribed steroids to take at home. GCS 15.    Pt is alert and oriented, speaking in full sentences, follows commands and responds appropriately to questions. NAD. Resp are even and unlabored.      Pt placed in [facility] Lounge. Pt educated on triage process and apologized for wait times. Pt encouraged to alert staff for any changes.     Patient and staff wearing appropriate PPE    /75   Pulse 74   Temp 37.3 °C (99.1 °F)   Resp (!) 24   Ht 1.778 m (5' 10\")   Wt 108 kg (237 lb 3.4 oz)   SpO2 97%   BMI 34.04 kg/m²   "

## 2021-06-09 ENCOUNTER — APPOINTMENT (RX ONLY)
Dept: URBAN - METROPOLITAN AREA CLINIC 35 | Facility: CLINIC | Age: 61
Setting detail: DERMATOLOGY
End: 2021-06-09

## 2021-06-09 DIAGNOSIS — L82.1 OTHER SEBORRHEIC KERATOSIS: ICD-10-CM

## 2021-06-09 DIAGNOSIS — Z71.89 OTHER SPECIFIED COUNSELING: ICD-10-CM

## 2021-06-09 DIAGNOSIS — Z85.828 PERSONAL HISTORY OF OTHER MALIGNANT NEOPLASM OF SKIN: ICD-10-CM

## 2021-06-09 DIAGNOSIS — D22 MELANOCYTIC NEVI: ICD-10-CM

## 2021-06-09 DIAGNOSIS — L81.4 OTHER MELANIN HYPERPIGMENTATION: ICD-10-CM

## 2021-06-09 DIAGNOSIS — L30.9 DERMATITIS, UNSPECIFIED: ICD-10-CM

## 2021-06-09 PROBLEM — D22.5 MELANOCYTIC NEVI OF TRUNK: Status: ACTIVE | Noted: 2021-06-09

## 2021-06-09 PROCEDURE — ? COUNSELING

## 2021-06-09 PROCEDURE — 99213 OFFICE O/P EST LOW 20 MIN: CPT

## 2021-06-09 PROCEDURE — ? PRESCRIPTION

## 2021-06-09 RX ORDER — TRIAMCINOLONE ACETONIDE 1 MG/G
CREAM TOPICAL BID
Qty: 1 | Refills: 0 | Status: ERX

## 2021-06-09 ASSESSMENT — LOCATION SIMPLE DESCRIPTION DERM
LOCATION SIMPLE: LEFT CALF
LOCATION SIMPLE: LEFT UPPER BACK
LOCATION SIMPLE: ABDOMEN
LOCATION SIMPLE: RIGHT SHOULDER
LOCATION SIMPLE: LEFT SHOULDER
LOCATION SIMPLE: LOWER BACK

## 2021-06-09 ASSESSMENT — LOCATION ZONE DERM
LOCATION ZONE: LEG
LOCATION ZONE: TRUNK
LOCATION ZONE: ARM

## 2021-06-09 ASSESSMENT — LOCATION DETAILED DESCRIPTION DERM
LOCATION DETAILED: LEFT POSTERIOR SHOULDER
LOCATION DETAILED: SUPERIOR LUMBAR SPINE
LOCATION DETAILED: EPIGASTRIC SKIN
LOCATION DETAILED: LEFT MEDIAL UPPER BACK
LOCATION DETAILED: LEFT PROXIMAL CALF
LOCATION DETAILED: RIGHT LATERAL SHOULDER

## 2021-12-08 ENCOUNTER — APPOINTMENT (RX ONLY)
Dept: URBAN - METROPOLITAN AREA CLINIC 35 | Facility: CLINIC | Age: 61
Setting detail: DERMATOLOGY
End: 2021-12-08

## 2021-12-08 DIAGNOSIS — D18.0 HEMANGIOMA: ICD-10-CM

## 2021-12-08 DIAGNOSIS — L81.4 OTHER MELANIN HYPERPIGMENTATION: ICD-10-CM

## 2021-12-08 DIAGNOSIS — Z71.89 OTHER SPECIFIED COUNSELING: ICD-10-CM

## 2021-12-08 DIAGNOSIS — L30.9 DERMATITIS, UNSPECIFIED: ICD-10-CM

## 2021-12-08 DIAGNOSIS — L82.1 OTHER SEBORRHEIC KERATOSIS: ICD-10-CM

## 2021-12-08 DIAGNOSIS — Z85.828 PERSONAL HISTORY OF OTHER MALIGNANT NEOPLASM OF SKIN: ICD-10-CM

## 2021-12-08 DIAGNOSIS — D22 MELANOCYTIC NEVI: ICD-10-CM

## 2021-12-08 PROBLEM — D18.01 HEMANGIOMA OF SKIN AND SUBCUTANEOUS TISSUE: Status: ACTIVE | Noted: 2021-12-08

## 2021-12-08 PROBLEM — D22.5 MELANOCYTIC NEVI OF TRUNK: Status: ACTIVE | Noted: 2021-12-08

## 2021-12-08 PROCEDURE — ? BIOPSY BY PUNCH METHOD

## 2021-12-08 PROCEDURE — ? ADDITIONAL NOTES

## 2021-12-08 PROCEDURE — 11104 PUNCH BX SKIN SINGLE LESION: CPT

## 2021-12-08 PROCEDURE — 99213 OFFICE O/P EST LOW 20 MIN: CPT | Mod: 25

## 2021-12-08 PROCEDURE — ? COUNSELING

## 2021-12-08 ASSESSMENT — LOCATION SIMPLE DESCRIPTION DERM
LOCATION SIMPLE: ABDOMEN
LOCATION SIMPLE: LEFT SHOULDER
LOCATION SIMPLE: RIGHT FOREHEAD
LOCATION SIMPLE: RIGHT SHOULDER
LOCATION SIMPLE: LOWER BACK
LOCATION SIMPLE: LEFT UPPER BACK
LOCATION SIMPLE: LEFT CALF

## 2021-12-08 ASSESSMENT — LOCATION ZONE DERM
LOCATION ZONE: FACE
LOCATION ZONE: TRUNK
LOCATION ZONE: LEG
LOCATION ZONE: ARM

## 2021-12-08 ASSESSMENT — LOCATION DETAILED DESCRIPTION DERM
LOCATION DETAILED: EPIGASTRIC SKIN
LOCATION DETAILED: RIGHT FOREHEAD
LOCATION DETAILED: SUPERIOR LUMBAR SPINE
LOCATION DETAILED: LEFT POSTERIOR SHOULDER
LOCATION DETAILED: LEFT DISTAL CALF
LOCATION DETAILED: LEFT MEDIAL UPPER BACK
LOCATION DETAILED: RIGHT LATERAL SHOULDER

## 2021-12-08 NOTE — PROCEDURE: ADDITIONAL NOTES
Render Risk Assessment In Note?: no
Detail Level: Simple
Additional Notes: Pink itchy plaque started after an injury caused by rusted metal

## 2021-12-14 ENCOUNTER — RX ONLY (OUTPATIENT)
Age: 61
Setting detail: RX ONLY
End: 2021-12-14

## 2021-12-14 RX ORDER — HALOBETASOL PROPIONATE 0.5 MG/G
THIN LAYER CREAM TOPICAL BID
Qty: 50 | Refills: 0 | Status: ERX

## 2022-06-09 ENCOUNTER — APPOINTMENT (RX ONLY)
Dept: URBAN - METROPOLITAN AREA CLINIC 35 | Facility: CLINIC | Age: 62
Setting detail: DERMATOLOGY
End: 2022-06-09

## 2022-06-09 DIAGNOSIS — D18.0 HEMANGIOMA: ICD-10-CM

## 2022-06-09 DIAGNOSIS — Z71.89 OTHER SPECIFIED COUNSELING: ICD-10-CM

## 2022-06-09 DIAGNOSIS — L81.4 OTHER MELANIN HYPERPIGMENTATION: ICD-10-CM

## 2022-06-09 DIAGNOSIS — D22 MELANOCYTIC NEVI: ICD-10-CM

## 2022-06-09 DIAGNOSIS — Z85.828 PERSONAL HISTORY OF OTHER MALIGNANT NEOPLASM OF SKIN: ICD-10-CM

## 2022-06-09 DIAGNOSIS — L82.1 OTHER SEBORRHEIC KERATOSIS: ICD-10-CM

## 2022-06-09 DIAGNOSIS — L57.0 ACTINIC KERATOSIS: ICD-10-CM

## 2022-06-09 PROBLEM — D22.5 MELANOCYTIC NEVI OF TRUNK: Status: ACTIVE | Noted: 2022-06-09

## 2022-06-09 PROBLEM — D18.01 HEMANGIOMA OF SKIN AND SUBCUTANEOUS TISSUE: Status: ACTIVE | Noted: 2022-06-09

## 2022-06-09 PROCEDURE — ? LIQUID NITROGEN

## 2022-06-09 PROCEDURE — 99213 OFFICE O/P EST LOW 20 MIN: CPT | Mod: 25

## 2022-06-09 PROCEDURE — ? COUNSELING

## 2022-06-09 PROCEDURE — 17000 DESTRUCT PREMALG LESION: CPT

## 2022-06-09 ASSESSMENT — LOCATION ZONE DERM
LOCATION ZONE: FACE
LOCATION ZONE: NOSE
LOCATION ZONE: TRUNK
LOCATION ZONE: ARM

## 2022-06-09 ASSESSMENT — LOCATION SIMPLE DESCRIPTION DERM
LOCATION SIMPLE: RIGHT FOREHEAD
LOCATION SIMPLE: LEFT SHOULDER
LOCATION SIMPLE: LEFT UPPER BACK
LOCATION SIMPLE: RIGHT SHOULDER
LOCATION SIMPLE: ABDOMEN
LOCATION SIMPLE: NOSE
LOCATION SIMPLE: LOWER BACK

## 2022-06-09 ASSESSMENT — LOCATION DETAILED DESCRIPTION DERM
LOCATION DETAILED: LEFT POSTERIOR SHOULDER
LOCATION DETAILED: LEFT MEDIAL UPPER BACK
LOCATION DETAILED: SUPERIOR LUMBAR SPINE
LOCATION DETAILED: RIGHT LATERAL SHOULDER
LOCATION DETAILED: NASAL ROOT
LOCATION DETAILED: EPIGASTRIC SKIN
LOCATION DETAILED: RIGHT FOREHEAD

## 2022-06-09 NOTE — PROCEDURE: LIQUID NITROGEN
Detail Level: Detailed
Render Note In Bullet Format When Appropriate: No
Post-Care Instructions: I reviewed with the patient in detail post-care instructions. Patient is to wear sunprotection, and avoid picking at any of the treated lesions. Pt may apply Vaseline to crusted or scabbing areas.
Duration Of Freeze Thaw-Cycle (Seconds): 10
Number Of Freeze-Thaw Cycles: 1 freeze-thaw cycle
Consent: The patient's consent was obtained including but not limited to risks of crusting, scabbing, blistering, scarring, darker or lighter pigmentary change, recurrence, incomplete removal and infection.
Show Aperture Variable?: Yes

## 2022-06-13 DIAGNOSIS — I82.4Y2 DEEP VEIN THROMBOSIS (DVT) OF PROXIMAL VEIN OF LEFT LOWER EXTREMITY, UNSPECIFIED CHRONICITY (HCC): ICD-10-CM

## 2022-06-20 ENCOUNTER — ANTICOAGULATION MONITORING (OUTPATIENT)
Dept: VASCULAR LAB | Facility: MEDICAL CENTER | Age: 62
End: 2022-06-20
Payer: COMMERCIAL

## 2022-06-20 DIAGNOSIS — I26.99 OTHER ACUTE PULMONARY EMBOLISM, UNSPECIFIED WHETHER ACUTE COR PULMONALE PRESENT (HCC): ICD-10-CM

## 2022-06-20 DIAGNOSIS — I82.499 DEEP VEIN THROMBOSIS (DVT) OF OTHER VEIN OF LOWER EXTREMITY, UNSPECIFIED CHRONICITY, UNSPECIFIED LATERALITY (HCC): ICD-10-CM

## 2022-06-20 PROBLEM — I82.409 DEEP VEIN THROMBOSIS (HCC): Status: ACTIVE | Noted: 2022-06-20

## 2022-06-20 LAB — INR PPP: 3.1 (ref 2–3.5)

## 2022-06-20 PROCEDURE — 99213 OFFICE O/P EST LOW 20 MIN: CPT | Performed by: NURSE PRACTITIONER

## 2022-06-20 NOTE — PROGRESS NOTES
OP Anticoagulation Service Note    Date: 06/20/22       Plan: Pt presents today to [facility].  Started on anticoagulation with Eliquis earlier this mo after LE DVT diagnosis.  He denied any trauma, sx or other known provoking factor at the time. He states he was taking med, as prescribed, 10,mg twice daily, but subsequently presented to ER 3 days later (6/10/22) with chest pain and found to have an acute PE.  This was deemed Eliquis failure and pt was switched to lovenox bridge + warfarin and presents for that management today.  Pt is just slightly supratherapeutic. Denies any s/s of bleeding or clotting. Meds reviewed and pt was educated on drug/drug and food/drug interactions with warfarin.  Will continue warfarin dosing as follows. Follow up INR in 2-3 days via [facility] lab; SO placed.  Had INR with dose adjustments per PCP last wk: INR 6/15 was >3.7, per pt- was taking 10mg daily; his Lovenox was stopped.  He has been taking 7mg daily (5mg + 2, 1mg tablets) since 6/17  Vitals: /70, HR 70, 98%   Discussed changing to ONE dose of warfarin in the future, once mg/wk are stable and established.  For now, we will continue to use what he has on hand- 5mg and 1mg tabs      MON: 6mg, then 7mg qd until INR tested this Thur via lab       This evaluation was conducted via telemedicine visit using secure and encrypted videoconferencing technology.    The patient was physically located at [facility] in [place], [place]. The patient was presented by [facility].  The patient's identity was confirmed and verbal consent for the telemedicine encounter was obtained.      Anticoagulation Summary  As of 6/20/2022    INR goal:  2.0-3.0   TTR:  --   INR used for dosing:  3.10 (6/20/2022)   Warfarin maintenance plan:  7 mg (5 mg x 1 and 1 mg x 2) every day   Weekly warfarin total:  49 mg   Plan last modified:  VALORIE Salas (6/20/2022)   Next INR check:  6/23/2022   Target end date:   9/12/2022    Indications    Acute pulmonary embolism (HCC) [I26.99]  Deep vein thrombosis (HCC) [I82.409]             Anticoagulation Episode Summary     INR check location:      Preferred lab:      Send INR reminders to:      Comments:               Review of Systems   HENT: Negative for nosebleeds.   Respiratory: Negative for shortness of breath.  Gastrointestinal: Negative for blood in stool.   Genitourinary: Negative for hematuria.   Psychiatric/Behavioral: The patient is not nervous/anxious.     Physical Exam   Constitutional: Oriented to person, place, and time. Appears well-developed and well-nourished.   Pulmonary/Chest: Effort normal. No respiratory distress.   Skin: Not diaphoretic.  Psychiatric: Normal mood and affect. Behavior is normal.    -MA to send complete ER records later today to be scanned    LOKI Snyder  [facility]

## 2022-06-23 ENCOUNTER — ANTICOAGULATION MONITORING (OUTPATIENT)
Dept: VASCULAR LAB | Facility: MEDICAL CENTER | Age: 62
End: 2022-06-23
Payer: COMMERCIAL

## 2022-06-23 LAB — INR PPP: 3.9 (ref 2–3.5)

## 2022-06-23 NOTE — PROGRESS NOTES
Anticoagulation Summary  As of 6/23/2022    INR goal:  2.0-3.0   TTR:  --   INR used for dosing:  3.90 (6/23/2022)   Warfarin maintenance plan:  6 mg (5 mg x 1 and 1 mg x 1) every day   Weekly warfarin total:  42 mg   Plan last modified:  Karie Hollingsworth, JessicaD (6/23/2022)   Next INR check:  6/27/2022   Target end date:  9/12/2022    Indications    Acute pulmonary embolism (HCC) [I26.99]  Deep vein thrombosis (HCC) [I82.409]             Anticoagulation Episode Summary     INR check location:      Preferred lab:      Send INR reminders to:      Comments:            Refer to Anticoagulation Patient Findings for HPI  Patient Findings     Negatives:  Signs/symptoms of thrombosis, Signs/symptoms of bleeding, Laboratory test error suspected, Change in health, Change in alcohol use, Change in activity, Upcoming invasive procedure, Emergency department visit, Upcoming dental procedure, Missed doses, Extra doses, Change in medications, Change in diet/appetite, Hospital admission, Bruising, Other complaints          Spoke with pt.  INR is supratherapeutic.     Pt verifies warfarin weekly dosing.     Will have pt reduce dose to 5 mg tonight x 1 day then reduce weekly regimen to 6 mg daily    Repeat INR in 4 day(s).     Karie Hollingsworth, PharmD

## 2022-06-27 ENCOUNTER — ANTICOAGULATION MONITORING (OUTPATIENT)
Dept: VASCULAR LAB | Facility: MEDICAL CENTER | Age: 62
End: 2022-06-27
Payer: COMMERCIAL

## 2022-06-27 DIAGNOSIS — I82.409 DEEP VEIN THROMBOSIS (DVT) OF LOWER EXTREMITY, UNSPECIFIED CHRONICITY, UNSPECIFIED LATERALITY, UNSPECIFIED VEIN (HCC): ICD-10-CM

## 2022-06-27 DIAGNOSIS — I26.99 ACUTE PULMONARY EMBOLISM, UNSPECIFIED PULMONARY EMBOLISM TYPE, UNSPECIFIED WHETHER ACUTE COR PULMONALE PRESENT (HCC): ICD-10-CM

## 2022-06-27 LAB — INR PPP: 3.3 (ref 2–3.5)

## 2022-06-27 NOTE — PROGRESS NOTES
Anticoagulation Summary  As of 6/27/2022    INR goal:  2.0-3.0   TTR:  --   INR used for dosing:  3.30 (6/27/2022)   Warfarin maintenance plan:  5 mg (5 mg x 1) every day   Weekly warfarin total:  35 mg   Plan last modified:  Michael Pitt, PharmD (6/27/2022)   Next INR check:  6/30/2022   Target end date:  9/12/2022    Indications    Acute pulmonary embolism (HCC) [I26.99]  Deep vein thrombosis (HCC) [I82.409]             Anticoagulation Episode Summary     INR check location:      Preferred lab:      Send INR reminders to:      Comments:          Anticoagulation Patient Findings          HPI:  Jayce Espino, on anticoagulation therapy with warfarin for PE.   Changes to current medical/health status since last appt: none  Denies signs/symptoms of bleeding and/or thrombosis since the last appt.    Denies any interval changes to diet  Denies any interval changes to medications since last appt.   Denies any complications or cost restrictions with current therapy.     A/P   INR  SUPRA-therapeutic.   Begin 16% reduced regimen.     Next INR in 3 days.     Michael Pitt, PharmD

## 2022-07-01 ENCOUNTER — TELEPHONE (OUTPATIENT)
Dept: VASCULAR LAB | Facility: MEDICAL CENTER | Age: 62
End: 2022-07-01
Payer: COMMERCIAL

## 2022-07-01 ENCOUNTER — DOCUMENTATION (OUTPATIENT)
Dept: VASCULAR LAB | Facility: MEDICAL CENTER | Age: 62
End: 2022-07-01
Payer: COMMERCIAL

## 2022-07-01 ENCOUNTER — ANTICOAGULATION MONITORING (OUTPATIENT)
Dept: VASCULAR LAB | Facility: MEDICAL CENTER | Age: 62
End: 2022-07-01

## 2022-07-01 NOTE — TELEPHONE ENCOUNTER
[facility]    Left  requesting pt to obtain next INR ASAP.     Michael Pitt, PharmD

## 2022-07-01 NOTE — PROGRESS NOTES
Faxing request to [facility] Medical Records for pt's most recent INR to be faxed to [facility].    Pt: Jayce Espino    : 1960    Info requested: Most recent PT/INR result    [facility] Fax: [phone]    Cullen Cui PharmD, BCACP

## 2022-07-01 NOTE — PROGRESS NOTES
Faxing request for most recent INR to be sent to [facility]. Per pt it was 2 on 5/30, but he is unsure. [facility] will only fax info to us. Will f/u w/ pt once we receive official result.    Cullen Cui, PharmD, BCACP

## 2022-07-02 NOTE — PROGRESS NOTES
Still no INR from [facility] - called and notified pt of this. He will continue on w/ his current regimen until 7/5 - advised pt we would f/u once more then.    Cullen Cui, PharmD, BCACP

## 2022-07-05 ENCOUNTER — ANTICOAGULATION MONITORING (OUTPATIENT)
Dept: VASCULAR LAB | Facility: MEDICAL CENTER | Age: 62
End: 2022-07-05
Payer: COMMERCIAL

## 2022-07-05 DIAGNOSIS — I26.99 ACUTE PULMONARY EMBOLISM, UNSPECIFIED PULMONARY EMBOLISM TYPE, UNSPECIFIED WHETHER ACUTE COR PULMONALE PRESENT (HCC): ICD-10-CM

## 2022-07-05 DIAGNOSIS — I82.409 DEEP VEIN THROMBOSIS (DVT) OF LOWER EXTREMITY, UNSPECIFIED CHRONICITY, UNSPECIFIED LATERALITY, UNSPECIFIED VEIN (HCC): ICD-10-CM

## 2022-07-05 LAB — INR PPP: 1.6 (ref 2–3.5)

## 2022-07-05 NOTE — PROGRESS NOTES
Anticoagulation Summary  As of 2022    INR goal:  2.0-3.0   TTR:  62.4 % (5 d)   INR used for dosin.60 (2022)   Warfarin maintenance plan:  6 mg (5 mg x 1 and 1 mg x 1) every Sat; 5 mg (5 mg x 1) all other days   Weekly warfarin total:  36 mg   Plan last modified:  Ysabel Zaragoza, Pharmacy Intern (2022)   Next INR check:  2022   Target end date:  2022    Indications    Acute pulmonary embolism (HCC) [I26.99]  Deep vein thrombosis (HCC) [I82.409]             Anticoagulation Episode Summary     INR check location:      Preferred lab:      Send INR reminders to:      Comments:          Anticoagulation Patient Findings  Patient Findings     Negatives:  Signs/symptoms of thrombosis, Signs/symptoms of bleeding, Laboratory test error suspected, Change in health, Change in alcohol use, Change in activity, Upcoming invasive procedure, Emergency department visit, Upcoming dental procedure, Missed doses, Extra doses, Change in medications, Change in diet/appetite, Hospital admission, Bruising, Other complaints              Spoke with patient today regarding SUB-therapeutic INR of 1.6.  Patient denies any signs/symptoms of bruising or bleeding or any changes in diet and medications.  Instructed patient to call clinic with any questions or concerns.    Pt is not on antiplatelet therapy    Pt needs to be referred back to telemedicine now that it is available again.    Pt is to take 7.5 mg x 1, then increase weekly warfarin dosing regimen as listed above.  Follow up in 1 week, to reduce risk of adverse events related to this high risk medication,  Warfarin.    Reviewed plan with Eladio Zaragoza, Pharmacy Intern     I have reviewed and concur with the above plan.       Current Outpatient Medications:   •  lisinopril, 20 mg, Oral, DAILY  •  losartan, 25 mg, Oral, DAILY  •  hydroCHLOROthiazide, 25 mg, Oral, DAILY  •  methylPREDNISolone, Use as directed    Maciel Howell, PharmD, MS, BCACP,  [facility] [place] [facility]  Phone: [phone],  Fax: [phone]  On call: [phone]

## 2022-07-11 ENCOUNTER — ANTICOAGULATION MONITORING (OUTPATIENT)
Dept: CARDIOLOGY | Facility: MEDICAL CENTER | Age: 62
End: 2022-07-11
Payer: COMMERCIAL

## 2022-07-11 LAB — INR PPP: 1.8 (ref 2–3.5)

## 2022-07-11 NOTE — PROGRESS NOTES
Anticoagulation Summary  As of 2022    INR goal:  2.0-3.0   TTR:  28.3 % (1.6 wk)   INR used for dosin.80 (2022)   Warfarin maintenance plan:  6 mg (5 mg x 1 and 1 mg x 1) every Tue; 5 mg (5 mg x 1) all other days   Weekly warfarin total:  36 mg   Plan last modified:  Karie Hollingsworth PharmD (2022)   Next INR check:  2022   Target end date:  2022    Indications    Acute pulmonary embolism (HCC) [I26.99]  Deep vein thrombosis (HCC) [I82.409]             Anticoagulation Episode Summary     INR check location:      Preferred lab:      Send INR reminders to:      Comments:            Refer to Anticoagulation Patient Findings for HPI  Patient Findings     Negatives:  Signs/symptoms of thrombosis, Signs/symptoms of bleeding, Laboratory test error suspected, Change in health, Change in alcohol use, Change in activity, Upcoming invasive procedure, Emergency department visit, Upcoming dental procedure, Missed doses, Extra doses, Change in medications, Change in diet/appetite, Hospital admission, Bruising, Other complaints          Spoke with pt.  INR is subtherapeutic.     Pt verifies warfarin weekly dosing.     Will have pt bolus with 7.5 mg x 1 day then continue regimen as listed above    Repeat INR in 1 week(s).     Karie Hollingsworth, JessicaD

## 2022-07-18 ENCOUNTER — ANTICOAGULATION MONITORING (OUTPATIENT)
Dept: VASCULAR LAB | Facility: MEDICAL CENTER | Age: 62
End: 2022-07-18
Payer: COMMERCIAL

## 2022-07-18 DIAGNOSIS — I82.409 DEEP VEIN THROMBOSIS (DVT) OF LOWER EXTREMITY, UNSPECIFIED CHRONICITY, UNSPECIFIED LATERALITY, UNSPECIFIED VEIN (HCC): ICD-10-CM

## 2022-07-18 DIAGNOSIS — I26.99 ACUTE PULMONARY EMBOLISM, UNSPECIFIED PULMONARY EMBOLISM TYPE, UNSPECIFIED WHETHER ACUTE COR PULMONALE PRESENT (HCC): ICD-10-CM

## 2022-07-18 LAB — INR PPP: 1.8 (ref 2–3.5)

## 2022-07-18 PROCEDURE — 99214 OFFICE O/P EST MOD 30 MIN: CPT | Performed by: NURSE PRACTITIONER

## 2022-07-18 RX ORDER — ENOXAPARIN SODIUM 100 MG/ML
100 INJECTION SUBCUTANEOUS EVERY 12 HOURS
Qty: 10 EACH | Refills: 1 | Status: SHIPPED | OUTPATIENT
Start: 2022-07-18 | End: 2022-07-28

## 2022-07-18 NOTE — PROGRESS NOTES
OP Anticoagulation Service Note    Date: 22       Plan: Pt is again subtherapeutic. His VTE is within the last 30 days.  Will have him initiate Lovenox 100mg BID for the next 3 days.  Rx sent today and pt understands instructions. Denies any s/s of bleeding or clotting. Denies any changes in medications or diet. Will increase warfarin dosing as follows. Follow up appointment in 1 week via lab draw.  Vitals: /71, HR 64, 94%    Lovenox 100mg BID x 3 days      This evaluation was conducted via telemedicine visit using secure and encrypted videoconferencing technology.    The patient was physically located at [facility] in [place], [place]. The patient was presented by [facility] Medical Professional.  The patient's identity was confirmed and verbal consent for the telemedicine encounter was obtained.     Anticoagulation Summary  As of 2022    INR goal:  2.0-3.0   TTR:  17.3 % (2.6 wk)   INR used for dosin.80 (2022)   Warfarin maintenance plan:  5 mg (5 mg x 1) every Sun, Thu, Sat; 6 mg (5 mg x 1 and 1 mg x 1) every Tue; 7.5 mg (5 mg x 1.5) all other days   Weekly warfarin total:  43.5 mg   Plan last modified:  AMBAR SalasNYohan (2022)   Next INR check:  2022   Target end date:  2022    Indications    Acute pulmonary embolism (HCC) [I26.99]  Deep vein thrombosis (HCC) [I82.409]             Anticoagulation Episode Summary     INR check location:      Preferred lab:      Send INR reminders to:      Comments:              Review of Systems   HENT: Negative for nosebleeds.   Respiratory: Negative for shortness of breath.  Gastrointestinal: Negative for blood in stool.   Genitourinary: Negative for hematuria.   Psychiatric/Behavioral: The patient is not nervous/anxious.     Physical Exam   Constitutional: Oriented to person, place, and time. Appears well-developed and well-nourished.   Pulmonary/Chest: Effort normal. No respiratory distress.   Skin: Not  diaphoretic.  Psychiatric: Normal mood and affect. Behavior is normal.      LOKI Snyder  [facility]

## 2022-07-25 ENCOUNTER — ANTICOAGULATION MONITORING (OUTPATIENT)
Dept: MEDICAL GROUP | Facility: PHYSICIAN GROUP | Age: 62
End: 2022-07-25
Payer: COMMERCIAL

## 2022-07-25 DIAGNOSIS — I82.409 DEEP VEIN THROMBOSIS (DVT) OF LOWER EXTREMITY, UNSPECIFIED CHRONICITY, UNSPECIFIED LATERALITY, UNSPECIFIED VEIN (HCC): ICD-10-CM

## 2022-07-25 DIAGNOSIS — I26.99 ACUTE PULMONARY EMBOLISM, UNSPECIFIED PULMONARY EMBOLISM TYPE, UNSPECIFIED WHETHER ACUTE COR PULMONALE PRESENT (HCC): ICD-10-CM

## 2022-07-25 LAB — INR PPP: 1.7 (ref 2–3.5)

## 2022-07-25 RX ORDER — WARFARIN SODIUM 5 MG/1
5-7.5 TABLET ORAL DAILY
Qty: 135 TABLET | Refills: 1 | Status: SHIPPED | OUTPATIENT
Start: 2022-07-25 | End: 2022-11-21 | Stop reason: SDUPTHER

## 2022-07-25 NOTE — PROGRESS NOTES
Anticoagulation Summary  As of 2022    INR goal:  2.0-3.0   TTR:  12.5 % (3.6 wk)   INR used for dosin.70 (2022)   Warfarin maintenance plan:  5 mg (5 mg x 1) every Sun; 7.5 mg (5 mg x 1.5) all other days   Weekly warfarin total:  50 mg   Plan last modified:  Jessica MarcumD (2022)   Next INR check:  2022   Target end date:  2022    Indications    Acute pulmonary embolism (HCC) [I26.99]  Deep vein thrombosis (HCC) [I82.409]             Anticoagulation Episode Summary     INR check location:      Preferred lab:      Send INR reminders to:      Comments:            Refer to Anticoagulation Patient Findings for HPI  Patient Findings     Negatives:  Signs/symptoms of thrombosis, Signs/symptoms of bleeding, Laboratory test error suspected, Change in health, Change in alcohol use, Change in activity, Upcoming invasive procedure, Emergency department visit, Upcoming dental procedure, Missed doses, Extra doses, Change in medications, Change in diet/appetite, Hospital admission, Bruising, Other complaints          Spoke with pt.  INR is subtherapeutic.     Pt verifies warfarin weekly dosing.     Will have pt bolus with 10 mg x 1 day then increase weekly regimen + start bridging with Lovenox    Repeat INR in 3 day(s).     Karie Hollingsworth, PharmD

## 2022-07-25 NOTE — PROGRESS NOTES
Attempted to reach pt several times regarding subtherapeutic INR. He recently had DVT in last 3 months. Will need bridging likely. He also needs to be r/s for [employer] telemedicine. Left VM for pt to please call us back JOSE Carrera, JessicaD

## 2022-07-28 ENCOUNTER — ANTICOAGULATION MONITORING (OUTPATIENT)
Dept: VASCULAR LAB | Facility: MEDICAL CENTER | Age: 62
End: 2022-07-28
Payer: COMMERCIAL

## 2022-07-28 LAB — INR PPP: 2.6 (ref 2–3.5)

## 2022-07-28 NOTE — PROGRESS NOTES
OP Anticoagulation Service Note    Date: 2022    Anticoagulation Summary  As of 2022    INR goal:  2.0-3.0   TTR:  18.3 % (4 wk)   INR used for dosin.60 (2022)   Warfarin maintenance plan:  7.5 mg (5 mg x 1.5) every day   Weekly warfarin total:  52.5 mg   Plan last modified:  Maciel Howell, PharmD (2022)   Next INR check:  2022   Target end date:  2022    Indications    Acute pulmonary embolism (HCC) [I26.99]  Deep vein thrombosis (HCC) [I82.409]             Anticoagulation Episode Summary     INR check location:      Preferred lab:      Send INR reminders to:      Comments:          Anticoagulation Patient Findings      Voice message for patient regarding their anticoagulant.     HPI:   The reason for today's call is to prevent morbidity and mortality from a blood clot and/or stroke and to reduce the risk of bleeding while on a anticoagulant.     PCP:  No primary care provider on file.  No primary provider on file.    Assessment:     • INR  therapeutic.     Lab Results   Component Value Date/Time    BUN 23 (H) 2021 09:13 PM    CREATININE 0.87 2021 09:13 PM     Lab Results   Component Value Date/Time    HEMOGLOBIN 16.3 2021 09:13 PM    HEMATOCRIT 44.4 2021 09:13 PM    PLATELETCT 235 2021 09:13 PM    ALKPHOSPHAT 74 2021 09:13 PM    ASTSGOT 25 2021 09:13 PM    ALTSGPT 31 2021 09:13 PM          Current Outpatient Medications:   •  warfarin, 5-7.5 mg, Oral, DAILY  •  lisinopril, 20 mg, Oral, DAILY  •  losartan, 25 mg, Oral, DAILY  •  hydroCHLOROthiazide, 25 mg, Oral, DAILY      Plan:     • Continue the same warfarin dose, as noted above.   • Stop lovenox     Follow-up:     • test in 4 days         Additional information discussed with patient:     • Asked patient to please call the anticoagulation clinic if they have any signs/symptoms of bleeding and/or thrombosis or any changes to diet or medications.      National recommendations  regarding anticoagulation therapy:     The CHEST guidelines recommends frequent INR monitoring at regular intervals (a few days up to a max of 12 weeks) to ensure patients are on the proper dose of warfarin, and patients are not having any complications from therapy.  INRs can dramatically change over a short time period due to diet, medications, and medical conditions.     [facility]  Phone [phone] fax [phone]    This note was created using voice recognition software (Dragon). The accuracy of the dictation is limited by the abilities of the software. I have reviewed the note prior to signing, however some errors in grammar and context are still possible. If you have any questions related to this note please do not hesitate to contact our office.

## 2022-08-01 ENCOUNTER — ANTICOAGULATION MONITORING (OUTPATIENT)
Dept: VASCULAR LAB | Facility: MEDICAL CENTER | Age: 62
End: 2022-08-01
Payer: COMMERCIAL

## 2022-08-01 DIAGNOSIS — I26.99 ACUTE PULMONARY EMBOLISM, UNSPECIFIED PULMONARY EMBOLISM TYPE, UNSPECIFIED WHETHER ACUTE COR PULMONALE PRESENT (HCC): ICD-10-CM

## 2022-08-01 DIAGNOSIS — I82.409 DEEP VEIN THROMBOSIS (DVT) OF LOWER EXTREMITY, UNSPECIFIED CHRONICITY, UNSPECIFIED LATERALITY, UNSPECIFIED VEIN (HCC): ICD-10-CM

## 2022-08-01 LAB — INR PPP: 2.6 (ref 2–3.5)

## 2022-08-01 PROCEDURE — 99213 OFFICE O/P EST LOW 20 MIN: CPT | Performed by: NURSE PRACTITIONER

## 2022-08-01 NOTE — PROGRESS NOTES
OP Anticoagulation Service Note    Date: 22       Plan: Pt is therapeutic. Denies any s/s of bleeding or clotting. Denies any changes in medications or diet. Will continue warfarin dosing as follows. Follow up appointment in 2 week(s).   Vitals: /64, HR 63, 98%      This evaluation was conducted via telemedicine visit using secure and encrypted videoconferencing technology.    The patient was physically located at [facility] in [place], [place]. The patient was presented by [facility] Medical Professional.  The patient's identity was confirmed and verbal consent for the telemedicine encounter was obtained.        Anticoagulation Summary  As of 2022    INR goal:  2.0-3.0   TTR:  28.5 % (1.1 mo)   INR used for dosin.60 (2022)   Warfarin maintenance plan:  7.5 mg (5 mg x 1.5) every day   Weekly warfarin total:  52.5 mg   Plan last modified:  Maciel Howell, PharmD (2022)   Next INR check:     Target end date:  2022    Indications    Acute pulmonary embolism (HCC) [I26.99]  Deep vein thrombosis (HCC) [I82.409]             Anticoagulation Episode Summary     INR check location:      Preferred lab:      Send INR reminders to:      Comments:              Review of Systems   HENT: Negative for nosebleeds.   Respiratory: Negative for shortness of breath.  Gastrointestinal: Negative for blood in stool.   Genitourinary: Negative for hematuria.   Psychiatric/Behavioral: The patient is not nervous/anxious.     Physical Exam   Constitutional: Oriented to person, place, and time. Appears well-developed and well-nourished.   Pulmonary/Chest: Effort normal. No respiratory distress.   Skin: Not diaphoretic.  Psychiatric: Normal mood and affect. Behavior is normal.      LOKI Snyder  [place] [facility]

## 2022-08-15 ENCOUNTER — APPOINTMENT (OUTPATIENT)
Dept: VASCULAR LAB | Facility: MEDICAL CENTER | Age: 62
End: 2022-08-15
Payer: COMMERCIAL

## 2022-08-15 ENCOUNTER — ANTICOAGULATION MONITORING (OUTPATIENT)
Dept: VASCULAR LAB | Facility: MEDICAL CENTER | Age: 62
End: 2022-08-15
Payer: COMMERCIAL

## 2022-08-15 DIAGNOSIS — I82.409 DEEP VEIN THROMBOSIS (DVT) OF LOWER EXTREMITY, UNSPECIFIED CHRONICITY, UNSPECIFIED LATERALITY, UNSPECIFIED VEIN (HCC): ICD-10-CM

## 2022-08-15 DIAGNOSIS — I26.99 ACUTE PULMONARY EMBOLISM, UNSPECIFIED PULMONARY EMBOLISM TYPE, UNSPECIFIED WHETHER ACUTE COR PULMONALE PRESENT (HCC): ICD-10-CM

## 2022-08-15 LAB — INR PPP: 4 (ref 2–3.5)

## 2022-08-15 PROCEDURE — 99213 OFFICE O/P EST LOW 20 MIN: CPT | Performed by: NURSE PRACTITIONER

## 2022-08-15 NOTE — PROGRESS NOTES
OP Anticoagulation Service Note    Date: 08/15/22       Plan: Pt is supratherapeutic. Denies any s/s of bleeding or clotting. Denies any changes in medications or diet. Will decrease warfarin dosing as follows. Follow up appointment in 1 week(s).   Vitals: /79, HR 60, 97%     MON: No Coumadin     This evaluation was conducted via telemedicine visit using secure and encrypted videoconferencing technology.    The patient was physically located at [facility] in [place], [place]. The patient was presented by [facility] Medical Professional.  The patient's identity was confirmed and verbal consent for the telemedicine encounter was obtained.     Anticoagulation Summary  As of 8/15/2022      INR goal:  2.0-3.0   TTR:  28.5 % (1.5 mo)   INR used for dosin.00 (8/15/2022)   Warfarin maintenance plan:  5 mg (5 mg x 1) every Mon, Wed, Fri; 7.5 mg (5 mg x 1.5) all other days   Weekly warfarin total:  45 mg   Plan last modified:  PAULETTE SalasPYohanNYohan (8/15/2022)   Next INR check:     Target end date:  2022    Indications    Acute pulmonary embolism (HCC) [I26.99]  Deep vein thrombosis (HCC) [I82.409]                 Anticoagulation Episode Summary       INR check location:      Preferred lab:      Send INR reminders to:      Comments:                Review of Systems   HENT: Negative for nosebleeds.   Respiratory: Negative for shortness of breath.  Gastrointestinal: Negative for blood in stool.   Genitourinary: Negative for hematuria.   Psychiatric/Behavioral: The patient is not nervous/anxious.     Physical Exam   Constitutional: Oriented to person, place, and time. Appears well-developed and well-nourished.   Pulmonary/Chest: Effort normal. No respiratory distress.   Skin: Not diaphoretic.  Psychiatric: Normal mood and affect. Behavior is normal.      LOKI Snyder  [place] [facility]

## 2022-08-22 ENCOUNTER — APPOINTMENT (OUTPATIENT)
Dept: VASCULAR LAB | Facility: MEDICAL CENTER | Age: 62
End: 2022-08-22
Attending: NURSE PRACTITIONER
Payer: COMMERCIAL

## 2022-08-22 ENCOUNTER — ANTICOAGULATION MONITORING (OUTPATIENT)
Dept: VASCULAR LAB | Facility: MEDICAL CENTER | Age: 62
End: 2022-08-22
Payer: COMMERCIAL

## 2022-08-22 DIAGNOSIS — I26.99 ACUTE PULMONARY EMBOLISM, UNSPECIFIED PULMONARY EMBOLISM TYPE, UNSPECIFIED WHETHER ACUTE COR PULMONALE PRESENT (HCC): ICD-10-CM

## 2022-08-22 DIAGNOSIS — I82.409 DEEP VEIN THROMBOSIS (DVT) OF LOWER EXTREMITY, UNSPECIFIED CHRONICITY, UNSPECIFIED LATERALITY, UNSPECIFIED VEIN (HCC): ICD-10-CM

## 2022-08-22 LAB — INR PPP: 2.8 (ref 2–3.5)

## 2022-08-22 PROCEDURE — 99213 OFFICE O/P EST LOW 20 MIN: CPT | Performed by: NURSE PRACTITIONER

## 2022-08-22 NOTE — PROGRESS NOTES
OP Anticoagulation Service Note    Date: 22       Plan: Pt is therapeutic. Denies any s/s of bleeding or clotting. Denies any changes in medications or diet. Will continue  to decrease warfarin dosing as follows. Follow up appointment in 1 week(s).   Vitals: /67, HR 58, 97%      This evaluation was conducted via telemedicine visit using secure and encrypted videoconferencing technology.    The patient was physically located at [facility] in [place], [place]. The patient was presented by [facility] Medical Professional.  The patient's identity was confirmed and verbal consent for the telemedicine encounter was obtained.     Anticoagulation Summary  As of 2022      INR goal:  2.0-3.0   TTR:  27.0 % (1.8 mo)   INR used for dosin.80 (2022)   Warfarin maintenance plan:  7.5 mg (5 mg x 1.5) every Mon, Wed, Fri; 5 mg (5 mg x 1) all other days   Weekly warfarin total:  42.5 mg   Plan last modified:  Silvia Smalls, A.P.NYohan (2022)   Next INR check:  2022   Target end date:  2022    Indications    Acute pulmonary embolism (HCC) [I26.99]  Deep vein thrombosis (HCC) [I82.409]                 Anticoagulation Episode Summary       INR check location:      Preferred lab:      Send INR reminders to:      Comments:                Review of Systems   HENT: Negative for nosebleeds.   Respiratory: Negative for shortness of breath.  Gastrointestinal: Negative for blood in stool.   Genitourinary: Negative for hematuria.   Psychiatric/Behavioral: The patient is not nervous/anxious.     Physical Exam   Constitutional: Oriented to person, place, and time. Appears well-developed and well-nourished.   Pulmonary/Chest: Effort normal. No respiratory distress.   Skin: Not diaphoretic.  Psychiatric: Normal mood and affect. Behavior is normal.      LOKI Snyder  [place] [facility]

## 2022-08-29 ENCOUNTER — ANTICOAGULATION MONITORING (OUTPATIENT)
Dept: VASCULAR LAB | Facility: MEDICAL CENTER | Age: 62
End: 2022-08-29
Payer: COMMERCIAL

## 2022-08-29 ENCOUNTER — APPOINTMENT (OUTPATIENT)
Dept: VASCULAR LAB | Facility: MEDICAL CENTER | Age: 62
End: 2022-08-29
Attending: NURSE PRACTITIONER
Payer: COMMERCIAL

## 2022-08-29 DIAGNOSIS — I82.409 DEEP VEIN THROMBOSIS (DVT) OF LOWER EXTREMITY, UNSPECIFIED CHRONICITY, UNSPECIFIED LATERALITY, UNSPECIFIED VEIN (HCC): ICD-10-CM

## 2022-08-29 DIAGNOSIS — I26.99 ACUTE PULMONARY EMBOLISM, UNSPECIFIED PULMONARY EMBOLISM TYPE, UNSPECIFIED WHETHER ACUTE COR PULMONALE PRESENT (HCC): ICD-10-CM

## 2022-08-29 LAB — INR PPP: 3 (ref 2–3.5)

## 2022-08-29 PROCEDURE — 99213 OFFICE O/P EST LOW 20 MIN: CPT | Performed by: NURSE PRACTITIONER

## 2022-08-29 NOTE — PROGRESS NOTES
OP Anticoagulation Service Note    Date: 08/29/22       Plan: Pt is therapeutic. Denies any s/s of bleeding or clotting. Denies any changes in medications or diet. Will continue to decrease warfarin dosing as follows. Follow up appointment in 2 week(s).   Vitals: /65, HR 60, 96%      This evaluation was conducted via telemedicine visit using secure and encrypted videoconferencing technology.    The patient was physically located at [facility] in [place], [place]. The patient was presented by [facility] Medical Professional.  The patient's identity was confirmed and verbal consent for the telemedicine encounter was obtained.     Anticoagulation Summary  As of 8/29/2022      INR goal:  2.0-3.0   TTR:  35.5 % (2 mo)   INR used for dosing:  3.00 (8/29/2022)   Warfarin maintenance plan:  7.5 mg (5 mg x 1.5) every Mon, Fri; 5 mg (5 mg x 1) all other days   Weekly warfarin total:  40 mg   Plan last modified:  Silvia Smalls, A.P.NYohan (8/29/2022)   Next INR check:  9/12/2022   Target end date:  9/12/2022    Indications    Acute pulmonary embolism (HCC) [I26.99]  Deep vein thrombosis (HCC) [I82.409]                 Anticoagulation Episode Summary       INR check location:      Preferred lab:      Send INR reminders to:      Comments:                Review of Systems   HENT: Negative for nosebleeds.   Respiratory: Negative for shortness of breath.  Gastrointestinal: Negative for blood in stool.   Genitourinary: Negative for hematuria.   Psychiatric/Behavioral: The patient is not nervous/anxious.     Physical Exam   Constitutional: Oriented to person, place, and time. Appears well-developed and well-nourished.   Pulmonary/Chest: Effort normal. No respiratory distress.   Skin: Not diaphoretic.  Psychiatric: Normal mood and affect. Behavior is normal.      LOKI Snyder  [place] [facility]

## 2022-09-12 ENCOUNTER — APPOINTMENT (OUTPATIENT)
Dept: VASCULAR LAB | Facility: MEDICAL CENTER | Age: 62
End: 2022-09-12
Attending: NURSE PRACTITIONER
Payer: COMMERCIAL

## 2022-09-12 ENCOUNTER — ANTICOAGULATION MONITORING (OUTPATIENT)
Dept: VASCULAR LAB | Facility: MEDICAL CENTER | Age: 62
End: 2022-09-12
Payer: COMMERCIAL

## 2022-09-12 DIAGNOSIS — I82.409 DEEP VEIN THROMBOSIS (DVT) OF LOWER EXTREMITY, UNSPECIFIED CHRONICITY, UNSPECIFIED LATERALITY, UNSPECIFIED VEIN (HCC): ICD-10-CM

## 2022-09-12 DIAGNOSIS — I26.99 ACUTE PULMONARY EMBOLISM, UNSPECIFIED PULMONARY EMBOLISM TYPE, UNSPECIFIED WHETHER ACUTE COR PULMONALE PRESENT (HCC): ICD-10-CM

## 2022-09-12 LAB — INR PPP: 2.6 (ref 2–3.5)

## 2022-09-12 PROCEDURE — 99213 OFFICE O/P EST LOW 20 MIN: CPT | Performed by: NURSE PRACTITIONER

## 2022-09-12 NOTE — PROGRESS NOTES
OP Anticoagulation Service Note    Date: 22       Plan: Pt is therapeutic. Denies any s/s of bleeding or clotting. Denies any changes in medications or diet. Will continue warfarin dosing as follows. Follow up appointment in 4 week(s).   Vitals: /65, HR 60, 97%      This evaluation was conducted via telemedicine visit using secure and encrypted videoconferencing technology.    The patient was physically located at [facility] in [place], [place]. The patient was presented by [facility] Medical Professional.  The patient's identity was confirmed and verbal consent for the telemedicine encounter was obtained.     Anticoagulation Summary  As of 2022      INR goal:  2.0-3.0   TTR:  47.7 % (2.5 mo)   INR used for dosin.60 (2022)   Warfarin maintenance plan:  7.5 mg (5 mg x 1.5) every Mon, Fri; 5 mg (5 mg x 1) all other days   Weekly warfarin total:  40 mg   Plan last modified:  Silvia Smalls, A.P.NYohan (2022)   Next INR check:  10/10/2022   Target end date:  2022    Indications    Acute pulmonary embolism (HCC) [I26.99]  Deep vein thrombosis (HCC) [I82.409]                 Anticoagulation Episode Summary       INR check location:      Preferred lab:      Send INR reminders to:      Comments:                Review of Systems   HENT: Negative for nosebleeds.   Respiratory: Negative for shortness of breath.  Gastrointestinal: Negative for blood in stool.   Genitourinary: Negative for hematuria.   Psychiatric/Behavioral: The patient is not nervous/anxious.     Physical Exam   Constitutional: Oriented to person, place, and time. Appears well-developed and well-nourished.   Pulmonary/Chest: Effort normal. No respiratory distress.   Skin: Not diaphoretic.  Psychiatric: Normal mood and affect. Behavior is normal.      LOKI Snyder  [place] [facility]

## 2022-10-10 ENCOUNTER — ANTICOAGULATION MONITORING (OUTPATIENT)
Dept: VASCULAR LAB | Facility: MEDICAL CENTER | Age: 62
End: 2022-10-10
Payer: COMMERCIAL

## 2022-10-10 ENCOUNTER — APPOINTMENT (OUTPATIENT)
Dept: VASCULAR LAB | Facility: MEDICAL CENTER | Age: 62
End: 2022-10-10
Attending: NURSE PRACTITIONER
Payer: COMMERCIAL

## 2022-10-10 DIAGNOSIS — I26.99 ACUTE PULMONARY EMBOLISM, UNSPECIFIED PULMONARY EMBOLISM TYPE, UNSPECIFIED WHETHER ACUTE COR PULMONALE PRESENT (HCC): ICD-10-CM

## 2022-10-10 DIAGNOSIS — I82.409 DEEP VEIN THROMBOSIS (DVT) OF LOWER EXTREMITY, UNSPECIFIED CHRONICITY, UNSPECIFIED LATERALITY, UNSPECIFIED VEIN (HCC): ICD-10-CM

## 2022-10-10 LAB — INR PPP: 2.1 (ref 2–3.5)

## 2022-10-10 PROCEDURE — 99213 OFFICE O/P EST LOW 20 MIN: CPT | Performed by: NURSE PRACTITIONER

## 2022-10-10 NOTE — PROGRESS NOTES
OP Anticoagulation Service Note    Date: 10/10/22       Plan: Pt is therapeutic. Denies any s/s of bleeding or clotting. Denies any changes in medications or diet. Will continue warfarin dosing as follows. Follow up appointment in 6 week(s).   Vitals: /68, HR 64, 96%      This evaluation was conducted via telemedicine visit using secure and encrypted videoconferencing technology.    The patient was physically located at [facility] in [place], [place]. The patient was presented by [facility] Medical Professional.  The patient's identity was confirmed and verbal consent for the telemedicine encounter was obtained.     Anticoagulation Summary  As of 10/10/2022      INR goal:  2.0-3.0   TTR:  62.0 % (3.4 mo)   INR used for dosin.10 (10/10/2022)   Warfarin maintenance plan:  7.5 mg (5 mg x 1.5) every Mon, Fri; 5 mg (5 mg x 1) all other days   Weekly warfarin total:  40 mg   Plan last modified:  Silvia Smalls, A.P.NYohan (2022)   Next INR check:  2022   Target end date:  2022    Indications    Acute pulmonary embolism (HCC) [I26.99]  Deep vein thrombosis (HCC) [I82.409]                 Anticoagulation Episode Summary       INR check location:      Preferred lab:      Send INR reminders to:      Comments:                Review of Systems   HENT: Negative for nosebleeds.   Respiratory: Negative for shortness of breath.  Gastrointestinal: Negative for blood in stool.   Genitourinary: Negative for hematuria.   Psychiatric/Behavioral: The patient is not nervous/anxious.     Physical Exam   Constitutional: Oriented to person, place, and time. Appears well-developed and well-nourished.   Pulmonary/Chest: Effort normal. No respiratory distress.   Skin: Not diaphoretic.  Psychiatric: Normal mood and affect. Behavior is normal.      LOKI Snyder  [place] [facility]

## 2022-11-21 ENCOUNTER — TELEMEDICINE2 (OUTPATIENT)
Dept: VASCULAR LAB | Facility: MEDICAL CENTER | Age: 62
End: 2022-11-21
Attending: NURSE PRACTITIONER
Payer: COMMERCIAL

## 2022-11-21 DIAGNOSIS — I82.409 DEEP VEIN THROMBOSIS (DVT) OF LOWER EXTREMITY, UNSPECIFIED CHRONICITY, UNSPECIFIED LATERALITY, UNSPECIFIED VEIN (HCC): ICD-10-CM

## 2022-11-21 DIAGNOSIS — I26.99 ACUTE PULMONARY EMBOLISM, UNSPECIFIED PULMONARY EMBOLISM TYPE, UNSPECIFIED WHETHER ACUTE COR PULMONALE PRESENT (HCC): ICD-10-CM

## 2022-11-21 LAB — INR PPP: 3.4 (ref 2–3.5)

## 2022-11-21 PROCEDURE — 99999 PR NO CHARGE: CPT | Performed by: NURSE PRACTITIONER

## 2022-11-21 PROCEDURE — 99213 OFFICE O/P EST LOW 20 MIN: CPT | Performed by: NURSE PRACTITIONER

## 2022-11-21 PROCEDURE — 85610 PROTHROMBIN TIME: CPT

## 2022-11-21 RX ORDER — WARFARIN SODIUM 5 MG/1
5-7.5 TABLET ORAL DAILY
Qty: 135 TABLET | Refills: 1 | Status: SHIPPED | OUTPATIENT
Start: 2022-11-21 | End: 2023-08-14

## 2022-11-21 NOTE — PROGRESS NOTES
Anticoagulation Summary  As of 11/21/2022      INR goal:  2.0-3.0   TTR:  64.1 % (4.8 mo)   INR used for dosing:  3.40 (11/21/2022)   Warfarin maintenance plan:  7.5 mg (5 mg x 1.5) every Mon, Fri; 5 mg (5 mg x 1) all other days; Starting 11/21/2022   Weekly warfarin total:  40 mg   Plan last modified:  Silvia Smalls A.P.NYohan (8/29/2022)   Next INR check:  12/5/2022   Target end date:  3/12/2023    Indications    Acute pulmonary embolism (HCC) [I26.99]  Deep vein thrombosis (HCC) [I82.409]                 Anticoagulation Episode Summary       INR check location:      Preferred lab:      Send INR reminders to:      Comments:  9 months of therapy then re-eval with pt's PCP, Dr Laurent Moser           This evaluation was conducted via telemedicine visit using secure and encrypted videoconferencing technology.   The patient was physically located at [facility] in [place], [place]. The patient was presented by [facility] Medical Professional.  The patient's identity was confirmed and verbal consent for the telemedicine encounter was obtained.       Refer to Patient Findings for HPI:  Patient Findings       Negatives:  Signs/symptoms of thrombosis, Signs/symptoms of bleeding, Laboratory test error suspected, Change in health, Change in alcohol use, Change in activity, Upcoming invasive procedure, Emergency department visit, Upcoming dental procedure, Missed doses, Extra doses, Change in medications, Change in diet/appetite, Hospital admission, Bruising, Other complaints            There were no vitals filed for this visit.    Verified current warfarin dosing schedule.    Medications reconciled   Pt is not on antiplatelet therapy    Discussed OAC LOT with patient. His PCP wants him on OAC for at least 9 months and is managing his length of therapy. Pt will let us know if he needs a vascular med consult to help determine LOT.    A/P   INR  supra-therapeutic. Unsure why INR elevated. Previous two  INRs stable. With shared decision-making we will do a one time dose decrease.    Warfarin dosing recommendation: Take 5 mg tonight then resume your usual regimen.    Pt educated to contact our clinic with any changes in medications or s/s of bleeding or thrombosis. Pt is aware to seek immediate medical attention for falls, head injury or deep cuts.    Follow up appointment in 2 week(s).    PAULA Rivera.

## 2022-12-05 ENCOUNTER — ANTICOAGULATION MONITORING (OUTPATIENT)
Dept: VASCULAR LAB | Facility: MEDICAL CENTER | Age: 62
End: 2022-12-05
Payer: COMMERCIAL

## 2022-12-05 ENCOUNTER — APPOINTMENT (OUTPATIENT)
Dept: VASCULAR LAB | Facility: MEDICAL CENTER | Age: 62
End: 2022-12-05
Attending: NURSE PRACTITIONER
Payer: COMMERCIAL

## 2022-12-05 DIAGNOSIS — I82.409 DEEP VEIN THROMBOSIS (DVT) OF LOWER EXTREMITY, UNSPECIFIED CHRONICITY, UNSPECIFIED LATERALITY, UNSPECIFIED VEIN (HCC): ICD-10-CM

## 2022-12-05 DIAGNOSIS — I26.99 ACUTE PULMONARY EMBOLISM, UNSPECIFIED PULMONARY EMBOLISM TYPE, UNSPECIFIED WHETHER ACUTE COR PULMONALE PRESENT (HCC): ICD-10-CM

## 2022-12-05 LAB — INR PPP: 2.1 (ref 2–3.5)

## 2022-12-05 PROCEDURE — 99213 OFFICE O/P EST LOW 20 MIN: CPT | Performed by: NURSE PRACTITIONER

## 2022-12-05 NOTE — PROGRESS NOTES
OP Anticoagulation Service Note    Date: 22       Plan: Pt is therapeutic. Denies any s/s of bleeding or clotting. Denies any changes in medications or diet. Will continue warfarin dosing as follows. Follow up appointment in 2 week(s).   Vitals: /69, HR 66, 97%      This evaluation was conducted via telemedicine visit using secure and encrypted videoconferencing technology.    The patient was physically located at [facility] [place] in [place], [place]. The patient was presented by [facility] Medical Professional.  The patient's identity was confirmed and verbal consent for the telemedicine encounter was obtained.     Anticoagulation Summary  As of 2022      INR goal:  2.0-3.0   TTR:  64.6 % (5.3 mo)   INR used for dosin.10 (2022)   Warfarin maintenance plan:  7.5 mg (5 mg x 1.5) every Mon, Fri; 5 mg (5 mg x 1) all other days; Starting 2022   Weekly warfarin total:  40 mg   Plan last modified:  Silvia Smalls A.P.NYohan (2022)   Next INR check:  2022   Target end date:  3/12/2023    Indications    Acute pulmonary embolism (HCC) [I26.99]  Deep vein thrombosis (HCC) [I82.409]                 Anticoagulation Episode Summary       INR check location:      Preferred lab:      Send INR reminders to:      Comments:  9 months of therapy then re-eval with pt's PCP, Dr Laurent Moser  Possible Eliquis failure              Review of Systems   HENT: Negative for nosebleeds.   Respiratory: Negative for shortness of breath.  Gastrointestinal: Negative for blood in stool.   Genitourinary: Negative for hematuria.   Psychiatric/Behavioral: The patient is not nervous/anxious.     Physical Exam   Constitutional: Oriented to person, place, and time. Appears well-developed and well-nourished.   Pulmonary/Chest: Effort normal. No respiratory distress.   Skin: Not diaphoretic.  Psychiatric: Normal mood and affect. Behavior is normal.      LOKI Snyder  [place] [facility]

## 2022-12-19 ENCOUNTER — ANTICOAGULATION MONITORING (OUTPATIENT)
Dept: VASCULAR LAB | Facility: MEDICAL CENTER | Age: 62
End: 2022-12-19
Payer: COMMERCIAL

## 2022-12-19 ENCOUNTER — APPOINTMENT (OUTPATIENT)
Dept: VASCULAR LAB | Facility: MEDICAL CENTER | Age: 62
End: 2022-12-19
Attending: NURSE PRACTITIONER
Payer: COMMERCIAL

## 2022-12-19 DIAGNOSIS — I82.409 DEEP VEIN THROMBOSIS (DVT) OF LOWER EXTREMITY, UNSPECIFIED CHRONICITY, UNSPECIFIED LATERALITY, UNSPECIFIED VEIN (HCC): ICD-10-CM

## 2022-12-19 DIAGNOSIS — M25.561 ACUTE PAIN OF RIGHT KNEE: ICD-10-CM

## 2022-12-19 DIAGNOSIS — I26.99 ACUTE PULMONARY EMBOLISM, UNSPECIFIED PULMONARY EMBOLISM TYPE, UNSPECIFIED WHETHER ACUTE COR PULMONALE PRESENT (HCC): ICD-10-CM

## 2022-12-19 LAB — INR PPP: 2.6 (ref 2–3.5)

## 2022-12-19 PROCEDURE — 99213 OFFICE O/P EST LOW 20 MIN: CPT | Performed by: NURSE PRACTITIONER

## 2022-12-19 NOTE — PROGRESS NOTES
OP Anticoagulation Service Note    Date: 22       Plan: Pt is therapeutic. Denies any s/s of bleeding, however, he has had pain off/on behind his R knee for the last mo or so.  Denies missing any doses of warfarin; no redness, warmth, or swelling.  Denies any changes in medications or diet. Will continue warfarin dosing as follows. Follow up appointment in 3 weeks for INR.  Will get u/s of RLE to r/o any VTE given his hx of clot while on Eliquis 2022 (to be done prior to GI w/u)  Is having a colonoscopy/endoscopy 22; is to be off warfarin 5 days prior.  Last dose of warfarin 22.  Because he is >90 days from VTE, do not recommend Lovenox bridge at present.  Vitals: /69, HR 70, 98%      This evaluation was conducted via telemedicine visit using secure and encrypted videoconferencing technology.    The patient was physically located at [facility] in [place], [place]. The patient was presented by [facility] Medical Professional.  The patient's identity was confirmed and verbal consent for the telemedicine encounter was obtained.     Anticoagulation Summary  As of 2022      INR goal:  2.0-3.0   TTR:  67.5 % (5.7 mo)   INR used for dosin.60 (2022)   Warfarin maintenance plan:  7.5 mg (5 mg x 1.5) every Mon, Fri; 5 mg (5 mg x 1) all other days; Starting 2022   Weekly warfarin total:  40 mg   Plan last modified:  Silvia Smalls AYohanP.NYohan (2022)   Next INR check:     Target end date:  3/12/2023    Indications    Acute pulmonary embolism (HCC) [I26.99]  Deep vein thrombosis (HCC) [I82.409]                 Anticoagulation Episode Summary       INR check location:      Preferred lab:      Send INR reminders to:      Comments:  9 months of therapy then re-eval with pt's PCP, Dr Laurent Moser  Possible Eliquis failure              Review of Systems   HENT: Negative for nosebleeds.   Respiratory: Negative for shortness of breath.  Gastrointestinal:  Negative for blood in stool.   Genitourinary: Negative for hematuria.   Psychiatric/Behavioral: The patient is not nervous/anxious.     Physical Exam   Constitutional: Oriented to person, place, and time. Appears well-developed and well-nourished.   Pulmonary/Chest: Effort normal. No respiratory distress.   Skin: Not diaphoretic.  Psychiatric: Normal mood and affect. Behavior is normal.      LOKI Snyder  [place] [facility]

## 2022-12-20 ENCOUNTER — APPOINTMENT (RX ONLY)
Dept: URBAN - METROPOLITAN AREA CLINIC 35 | Facility: CLINIC | Age: 62
Setting detail: DERMATOLOGY
End: 2022-12-20

## 2022-12-20 DIAGNOSIS — L20.89 OTHER ATOPIC DERMATITIS: ICD-10-CM

## 2022-12-20 DIAGNOSIS — L82.1 OTHER SEBORRHEIC KERATOSIS: ICD-10-CM

## 2022-12-20 DIAGNOSIS — D22 MELANOCYTIC NEVI: ICD-10-CM

## 2022-12-20 DIAGNOSIS — L70.2 ACNE VARIOLIFORMIS: ICD-10-CM

## 2022-12-20 DIAGNOSIS — Z85.828 PERSONAL HISTORY OF OTHER MALIGNANT NEOPLASM OF SKIN: ICD-10-CM

## 2022-12-20 DIAGNOSIS — Z71.89 OTHER SPECIFIED COUNSELING: ICD-10-CM

## 2022-12-20 DIAGNOSIS — D18.0 HEMANGIOMA: ICD-10-CM

## 2022-12-20 DIAGNOSIS — L81.4 OTHER MELANIN HYPERPIGMENTATION: ICD-10-CM

## 2022-12-20 PROBLEM — L20.84 INTRINSIC (ALLERGIC) ECZEMA: Status: ACTIVE | Noted: 2022-12-20

## 2022-12-20 PROBLEM — D18.01 HEMANGIOMA OF SKIN AND SUBCUTANEOUS TISSUE: Status: ACTIVE | Noted: 2022-12-20

## 2022-12-20 PROBLEM — D22.5 MELANOCYTIC NEVI OF TRUNK: Status: ACTIVE | Noted: 2022-12-20

## 2022-12-20 PROCEDURE — ? PRESCRIPTION

## 2022-12-20 PROCEDURE — ? ADDITIONAL NOTES

## 2022-12-20 PROCEDURE — 99213 OFFICE O/P EST LOW 20 MIN: CPT

## 2022-12-20 PROCEDURE — ? TREATMENT REGIMEN

## 2022-12-20 PROCEDURE — ? KOH PREP

## 2022-12-20 PROCEDURE — ? COUNSELING

## 2022-12-20 RX ORDER — CLOBETASOL PROPIONATE 0.5 MG/G
THIN LAYER CREAM TOPICAL BID
Qty: 60 | Refills: 1 | Status: ERX | COMMUNITY
Start: 2022-12-20

## 2022-12-20 RX ORDER — TACROLIMUS 1 MG/G
THIN LAYER OINTMENT TOPICAL BID
Qty: 60 | Refills: 0 | Status: ERX

## 2022-12-20 RX ADMIN — CLOBETASOL PROPIONATE THIN LAYER: 0.5 CREAM TOPICAL at 00:00

## 2022-12-20 ASSESSMENT — LOCATION SIMPLE DESCRIPTION DERM
LOCATION SIMPLE: RIGHT FOREHEAD
LOCATION SIMPLE: LEFT SHOULDER
LOCATION SIMPLE: LEFT UPPER BACK
LOCATION SIMPLE: ABDOMEN
LOCATION SIMPLE: RIGHT CALF
LOCATION SIMPLE: RIGHT SHOULDER
LOCATION SIMPLE: SCALP
LOCATION SIMPLE: LEFT CALF
LOCATION SIMPLE: LOWER BACK

## 2022-12-20 ASSESSMENT — LOCATION DETAILED DESCRIPTION DERM
LOCATION DETAILED: LEFT MEDIAL UPPER BACK
LOCATION DETAILED: EPIGASTRIC SKIN
LOCATION DETAILED: LEFT DISTAL CALF
LOCATION DETAILED: RIGHT DISTAL CALF
LOCATION DETAILED: RIGHT FOREHEAD
LOCATION DETAILED: LEFT POSTERIOR SHOULDER
LOCATION DETAILED: RIGHT SUPERIOR PARIETAL SCALP
LOCATION DETAILED: RIGHT LATERAL SHOULDER
LOCATION DETAILED: SUPERIOR LUMBAR SPINE

## 2022-12-20 ASSESSMENT — LOCATION ZONE DERM
LOCATION ZONE: SCALP
LOCATION ZONE: FACE
LOCATION ZONE: ARM
LOCATION ZONE: TRUNK
LOCATION ZONE: LEG

## 2022-12-20 NOTE — PROCEDURE: ADDITIONAL NOTES
Additional Notes: Prior biopsy showed spongiotic dermatitis with eosinophils.  This hasn't cleared up in a year with intermittent use of halobetasol.
Detail Level: Simple
Render Risk Assessment In Note?: no

## 2022-12-20 NOTE — PROCEDURE: TREATMENT REGIMEN
Detail Level: Zone
Initiate Treatment: Anti dandruff shampoo otc
Initiate Treatment: Clobetasol cream for 2 weeks alternating with 2 weeks off\\nTacrolimus bid \\nDaily moisturizer

## 2022-12-21 ENCOUNTER — TELEPHONE (OUTPATIENT)
Dept: VASCULAR LAB | Facility: MEDICAL CENTER | Age: 62
End: 2022-12-21
Payer: COMMERCIAL

## 2022-12-21 NOTE — TELEPHONE ENCOUNTER
Pt called asking what the status was on the insurance authorization for US imaging ordered by SUSAN Smalls.    Forwarded to [facility] Team    Karie L. Darrick, PharmD

## 2022-12-23 ENCOUNTER — TELEPHONE (OUTPATIENT)
Dept: VASCULAR LAB | Facility: MEDICAL CENTER | Age: 62
End: 2022-12-23
Payer: COMMERCIAL

## 2022-12-23 NOTE — TELEPHONE ENCOUNTER
[facility]    [facility] called requesting authorization for ultrasound. Will forward to vascular team.    Michael Pitt, PharmD      CC  Emili Sarah APRN

## 2023-01-09 ENCOUNTER — TELEMEDICINE2 (OUTPATIENT)
Dept: VASCULAR LAB | Facility: MEDICAL CENTER | Age: 63
End: 2023-01-09
Attending: NURSE PRACTITIONER
Payer: COMMERCIAL

## 2023-01-09 DIAGNOSIS — I82.409 DEEP VEIN THROMBOSIS (DVT) OF LOWER EXTREMITY, UNSPECIFIED CHRONICITY, UNSPECIFIED LATERALITY, UNSPECIFIED VEIN (HCC): ICD-10-CM

## 2023-01-09 DIAGNOSIS — I26.99 ACUTE PULMONARY EMBOLISM, UNSPECIFIED PULMONARY EMBOLISM TYPE, UNSPECIFIED WHETHER ACUTE COR PULMONALE PRESENT (HCC): ICD-10-CM

## 2023-01-09 LAB — INR PPP: 1.7 (ref 2–3.5)

## 2023-01-09 PROCEDURE — 99213 OFFICE O/P EST LOW 20 MIN: CPT | Performed by: NURSE PRACTITIONER

## 2023-01-09 NOTE — PROGRESS NOTES
OP Anticoagulation Service Note    Date: 23       Plan: Pt is subtherapeutic. Was off warfarin for colonoscopy/endoscopy 22; restarted warfarin that night.  Did NOT get u/s done d/t insurance issue.  Says leg pain has resolved.  Denies any s/s of bleeding or clotting. Denies any changes in medications or diet. Will increase warfarin dosing as follows. Follow up appointment in 2 week(s).   Vitals: /73, HR 68, 97%      This evaluation was conducted via telemedicine visit using secure and encrypted videoconferencing technology.    The patient was physically located at [facility] [place] in [place], [place]. The patient was presented by [facility] Medical Professional.  The patient's identity was confirmed and verbal consent for the telemedicine encounter was obtained.    Anticoagulation Summary  As of 2023      INR goal:  2.0-3.0   TTR:  67.4 % (6.4 mo)   INR used for dosin.70 (2023)   Warfarin maintenance plan:  7.5 mg (5 mg x 1.5) every Mon, Wed, Fri; 5 mg (5 mg x 1) all other days   Weekly warfarin total:  42.5 mg   Plan last modified:  Silvia Smalls, A.P.NYohan (2023)   Next INR check:     Target end date:  3/12/2023    Indications    Acute pulmonary embolism (HCC) [I26.99]  Deep vein thrombosis (HCC) [I82.409]                 Anticoagulation Episode Summary       INR check location:      Preferred lab:      Send INR reminders to:      Comments:  9 months of therapy then re-eval with pt's PCP, Dr Laurent Moser  Possible Eliquis failure               Review of Systems   HENT: Negative for nosebleeds.   Respiratory: Negative for shortness of breath.  Gastrointestinal: Negative for blood in stool.   Genitourinary: Negative for hematuria.   Psychiatric/Behavioral: The patient is not nervous/anxious.     Physical Exam   Constitutional: Oriented to person, place, and time. Appears well-developed and well-nourished.   Pulmonary/Chest: Effort normal. No respiratory  distress.   Skin: Not diaphoretic.  Psychiatric: Normal mood and affect. Behavior is normal.      LOKI Snyder  [place] [facility]

## 2023-01-23 ENCOUNTER — APPOINTMENT (OUTPATIENT)
Dept: VASCULAR LAB | Facility: MEDICAL CENTER | Age: 63
End: 2023-01-23
Attending: NURSE PRACTITIONER
Payer: COMMERCIAL

## 2023-01-23 ENCOUNTER — ANTICOAGULATION MONITORING (OUTPATIENT)
Dept: VASCULAR LAB | Facility: MEDICAL CENTER | Age: 63
End: 2023-01-23
Payer: COMMERCIAL

## 2023-01-23 DIAGNOSIS — I26.99 ACUTE PULMONARY EMBOLISM, UNSPECIFIED PULMONARY EMBOLISM TYPE, UNSPECIFIED WHETHER ACUTE COR PULMONALE PRESENT (HCC): ICD-10-CM

## 2023-01-23 DIAGNOSIS — I82.409 DEEP VEIN THROMBOSIS (DVT) OF LOWER EXTREMITY, UNSPECIFIED CHRONICITY, UNSPECIFIED LATERALITY, UNSPECIFIED VEIN (HCC): ICD-10-CM

## 2023-01-23 LAB — INR PPP: 1.9 (ref 2–3.5)

## 2023-01-23 PROCEDURE — 99213 OFFICE O/P EST LOW 20 MIN: CPT | Performed by: NURSE PRACTITIONER

## 2023-01-23 NOTE — PROGRESS NOTES
OP Anticoagulation Service Note    Date: 23       Plan: Pt is still slightly subtherapeutic. Denies any s/s of bleeding or clotting. Denies any changes in diet.  He is starting on omeprazole 40mg daily this afternoon, just got new rx; we discussed drug/drug interactions.  Will continue warfarin dosing as follows. Follow up appointment in 2 week(s).   Vitals: /77, HR 68, 96%      This evaluation was conducted via telemedicine visit using secure and encrypted videoconferencing technology.    The patient was physically located at [facility] in [place], [place]. The patient was presented by [facility] Medical Professional.  The patient's identity was confirmed and verbal consent for the telemedicine encounter was obtained.     Anticoagulation Summary  As of 2023      INR goal:  2.0-3.0   TTR:  62.8 % (6.9 mo)   INR used for dosin.90 (2023)   Warfarin maintenance plan:  7.5 mg (5 mg x 1.5) every Mon, Fri; 5 mg (5 mg x 1) all other days   Weekly warfarin total:  40 mg   Plan last modified:  AMBAR SalasNYohan (2023)   Next INR check:     Target end date:  3/12/2023    Indications    Acute pulmonary embolism (HCC) [I26.99]  Deep vein thrombosis (HCC) [I82.409]                 Anticoagulation Episode Summary       INR check location:      Preferred lab:      Send INR reminders to:      Comments:  9 months of therapy then re-eval with pt's PCP, Dr Laurent Moser  Possible Eliquis failure              Review of Systems   HENT: Negative for nosebleeds.   Respiratory: Negative for shortness of breath.  Gastrointestinal: Negative for blood in stool.   Genitourinary: Negative for hematuria.   Psychiatric/Behavioral: The patient is not nervous/anxious.     Physical Exam   Constitutional: Oriented to person, place, and time. Appears well-developed and well-nourished.   Pulmonary/Chest: Effort normal. No respiratory distress.   Skin: Not diaphoretic.  Psychiatric: Normal  mood and affect. Behavior is normal.      LOKI Snyder  [place] [facility]

## 2023-02-06 ENCOUNTER — ANTICOAGULATION MONITORING (OUTPATIENT)
Dept: VASCULAR LAB | Facility: MEDICAL CENTER | Age: 63
End: 2023-02-06
Payer: COMMERCIAL

## 2023-02-06 ENCOUNTER — APPOINTMENT (OUTPATIENT)
Dept: VASCULAR LAB | Facility: MEDICAL CENTER | Age: 63
End: 2023-02-06
Attending: NURSE PRACTITIONER
Payer: COMMERCIAL

## 2023-02-06 DIAGNOSIS — I82.409 DEEP VEIN THROMBOSIS (DVT) OF LOWER EXTREMITY, UNSPECIFIED CHRONICITY, UNSPECIFIED LATERALITY, UNSPECIFIED VEIN (HCC): ICD-10-CM

## 2023-02-06 DIAGNOSIS — I26.99 ACUTE PULMONARY EMBOLISM, UNSPECIFIED PULMONARY EMBOLISM TYPE, UNSPECIFIED WHETHER ACUTE COR PULMONALE PRESENT (HCC): ICD-10-CM

## 2023-02-06 LAB — INR PPP: 2.4 (ref 2–3.5)

## 2023-02-06 PROCEDURE — 99213 OFFICE O/P EST LOW 20 MIN: CPT | Performed by: NURSE PRACTITIONER

## 2023-02-06 RX ORDER — OMEPRAZOLE 40 MG/1
40 CAPSULE, DELAYED RELEASE ORAL DAILY
COMMUNITY

## 2023-02-06 NOTE — PROGRESS NOTES
OP Anticoagulation Service Note    Date: 23       Plan: Pt is therapeutic. Denies any s/s of bleeding or clotting. Denies any changes in diet. He is currently on amoxicillin since Friday x7 days. He is also on omeprazole; started 40mg qd the evening of our last visit.  Will continue warfarin dosing as follows. Follow up appointment in 3 week(s).   Vitals: /73, HR 69, 96%      This evaluation was conducted via telemedicine visit using secure and encrypted videoconferencing technology.    The patient was physically located at [facility] in [place], [place]. The patient was presented by [facility].  The patient's identity was confirmed and verbal consent for the telemedicine encounter was obtained.     Anticoagulation Summary  As of 2023      INR goal:  2.0-3.0   TTR:  63.9 % (7.4 mo)   INR used for dosin.40 (2023)   Warfarin maintenance plan:  7.5 mg (5 mg x 1.5) every Mon, Fri; 5 mg (5 mg x 1) all other days   Weekly warfarin total:  40 mg   Plan last modified:  PAULETTE SalasPYohanNYohan (2023)   Next INR check:  2023   Target end date:  3/12/2023    Indications    Acute pulmonary embolism (HCC) [I26.99]  Deep vein thrombosis (HCC) [I82.409]                 Anticoagulation Episode Summary       INR check location:      Preferred lab:      Send INR reminders to:      Comments:  9 months of therapy then re-eval with pt's PCP, Dr Laurent Moser  Possible Eliquis failure              Review of Systems   HENT: Negative for nosebleeds.   Respiratory: Negative for shortness of breath.  Gastrointestinal: Negative for blood in stool.   Genitourinary: Negative for hematuria.   Psychiatric/Behavioral: The patient is not nervous/anxious.     Physical Exam   Constitutional: Oriented to person, place, and time. Appears well-developed and well-nourished.   Pulmonary/Chest: Effort normal. No respiratory distress.   Skin: Not diaphoretic.  Psychiatric:  Normal mood and affect. Behavior is normal.      LOKI Snyder  [facility]

## 2023-02-20 ENCOUNTER — HOSPITAL ENCOUNTER (INPATIENT)
Facility: MEDICAL CENTER | Age: 63
LOS: 5 days | DRG: 282 | End: 2023-02-25
Attending: INTERNAL MEDICINE | Admitting: INTERNAL MEDICINE
Payer: COMMERCIAL

## 2023-02-20 DIAGNOSIS — I21.4 NSTEMI (NON-ST ELEVATED MYOCARDIAL INFARCTION) (HCC): ICD-10-CM

## 2023-02-20 DIAGNOSIS — I82.409 DEEP VEIN THROMBOSIS (DVT) OF LOWER EXTREMITY, UNSPECIFIED CHRONICITY, UNSPECIFIED LATERALITY, UNSPECIFIED VEIN (HCC): ICD-10-CM

## 2023-02-20 DIAGNOSIS — Z86.711 HX PULMONARY EMBOLISM: ICD-10-CM

## 2023-02-20 DIAGNOSIS — I26.99 ACUTE PULMONARY EMBOLISM, UNSPECIFIED PULMONARY EMBOLISM TYPE, UNSPECIFIED WHETHER ACUTE COR PULMONALE PRESENT (HCC): ICD-10-CM

## 2023-02-20 PROBLEM — R73.03 PREDIABETES: Status: ACTIVE | Noted: 2023-02-20

## 2023-02-20 PROBLEM — K22.70 BARRETT ESOPHAGUS: Chronic | Status: ACTIVE | Noted: 2023-02-20

## 2023-02-20 PROBLEM — I10 HYPERTENSION: Status: ACTIVE | Noted: 2023-02-20

## 2023-02-20 LAB
ANION GAP SERPL CALC-SCNC: 10 MMOL/L (ref 7–16)
APTT PPP: 42.3 SEC (ref 24.7–36)
BUN SERPL-MCNC: 18 MG/DL (ref 8–22)
CALCIUM SERPL-MCNC: 9.4 MG/DL (ref 8.5–10.5)
CHLORIDE SERPL-SCNC: 98 MMOL/L (ref 96–112)
CHOLEST SERPL-MCNC: 192 MG/DL (ref 100–199)
CO2 SERPL-SCNC: 26 MMOL/L (ref 20–33)
CREAT SERPL-MCNC: 0.63 MG/DL (ref 0.5–1.4)
ERYTHROCYTE [DISTWIDTH] IN BLOOD BY AUTOMATED COUNT: 40 FL (ref 35.9–50)
EST. AVERAGE GLUCOSE BLD GHB EST-MCNC: 140 MG/DL
GFR SERPLBLD CREATININE-BSD FMLA CKD-EPI: 106 ML/MIN/1.73 M 2
GLUCOSE SERPL-MCNC: 148 MG/DL (ref 65–99)
HBA1C MFR BLD: 6.5 % (ref 4–5.6)
HCT VFR BLD AUTO: 43.6 % (ref 42–52)
HDLC SERPL-MCNC: 35 MG/DL
HGB BLD-MCNC: 15.7 G/DL (ref 14–18)
INR PPP: 2.13 (ref 0.87–1.13)
LDLC SERPL CALC-MCNC: 117 MG/DL
MCH RBC QN AUTO: 32.8 PG (ref 27–33)
MCHC RBC AUTO-ENTMCNC: 36 G/DL (ref 33.7–35.3)
MCV RBC AUTO: 91.2 FL (ref 81.4–97.8)
PLATELET # BLD AUTO: 205 K/UL (ref 164–446)
PMV BLD AUTO: 9.8 FL (ref 9–12.9)
POTASSIUM SERPL-SCNC: 3.4 MMOL/L (ref 3.6–5.5)
PROTHROMBIN TIME: 23.2 SEC (ref 12–14.6)
RBC # BLD AUTO: 4.78 M/UL (ref 4.7–6.1)
SODIUM SERPL-SCNC: 134 MMOL/L (ref 135–145)
TRIGL SERPL-MCNC: 201 MG/DL (ref 0–149)
TROPONIN T SERPL-MCNC: 70 NG/L (ref 6–19)
UFH PPP CHRO-ACNC: <0.1 IU/ML
WBC # BLD AUTO: 7.3 K/UL (ref 4.8–10.8)

## 2023-02-20 PROCEDURE — 83036 HEMOGLOBIN GLYCOSYLATED A1C: CPT

## 2023-02-20 PROCEDURE — 80061 LIPID PANEL: CPT

## 2023-02-20 PROCEDURE — 700111 HCHG RX REV CODE 636 W/ 250 OVERRIDE (IP): Performed by: INTERNAL MEDICINE

## 2023-02-20 PROCEDURE — A9270 NON-COVERED ITEM OR SERVICE: HCPCS | Performed by: INTERNAL MEDICINE

## 2023-02-20 PROCEDURE — 770020 HCHG ROOM/CARE - TELE (206)

## 2023-02-20 PROCEDURE — 700102 HCHG RX REV CODE 250 W/ 637 OVERRIDE(OP): Performed by: INTERNAL MEDICINE

## 2023-02-20 PROCEDURE — 85027 COMPLETE CBC AUTOMATED: CPT

## 2023-02-20 PROCEDURE — 80048 BASIC METABOLIC PNL TOTAL CA: CPT

## 2023-02-20 PROCEDURE — 85520 HEPARIN ASSAY: CPT

## 2023-02-20 PROCEDURE — 36415 COLL VENOUS BLD VENIPUNCTURE: CPT

## 2023-02-20 PROCEDURE — 84484 ASSAY OF TROPONIN QUANT: CPT

## 2023-02-20 PROCEDURE — 93005 ELECTROCARDIOGRAM TRACING: CPT

## 2023-02-20 PROCEDURE — 85610 PROTHROMBIN TIME: CPT

## 2023-02-20 PROCEDURE — 99291 CRITICAL CARE FIRST HOUR: CPT | Performed by: INTERNAL MEDICINE

## 2023-02-20 PROCEDURE — 85730 THROMBOPLASTIN TIME PARTIAL: CPT

## 2023-02-20 RX ORDER — PROMETHAZINE HYDROCHLORIDE 25 MG/1
12.5-25 TABLET ORAL EVERY 4 HOURS PRN
Status: DISCONTINUED | OUTPATIENT
Start: 2023-02-20 | End: 2023-02-25 | Stop reason: HOSPADM

## 2023-02-20 RX ORDER — PROMETHAZINE HYDROCHLORIDE 25 MG/1
12.5-25 SUPPOSITORY RECTAL EVERY 4 HOURS PRN
Status: DISCONTINUED | OUTPATIENT
Start: 2023-02-20 | End: 2023-02-25 | Stop reason: HOSPADM

## 2023-02-20 RX ORDER — PROCHLORPERAZINE EDISYLATE 5 MG/ML
5-10 INJECTION INTRAMUSCULAR; INTRAVENOUS EVERY 4 HOURS PRN
Status: DISCONTINUED | OUTPATIENT
Start: 2023-02-20 | End: 2023-02-25 | Stop reason: HOSPADM

## 2023-02-20 RX ORDER — ACETAMINOPHEN 325 MG/1
650 TABLET ORAL EVERY 6 HOURS PRN
Status: ACTIVE | OUTPATIENT
Start: 2023-02-20 | End: 2023-02-20

## 2023-02-20 RX ORDER — ONDANSETRON 2 MG/ML
4 INJECTION INTRAMUSCULAR; INTRAVENOUS EVERY 4 HOURS PRN
Status: ACTIVE | OUTPATIENT
Start: 2023-02-20 | End: 2023-02-20

## 2023-02-20 RX ORDER — ONDANSETRON 4 MG/1
4 TABLET, ORALLY DISINTEGRATING ORAL EVERY 4 HOURS PRN
Status: DISCONTINUED | OUTPATIENT
Start: 2023-02-20 | End: 2023-02-25 | Stop reason: HOSPADM

## 2023-02-20 RX ORDER — LOSARTAN POTASSIUM 50 MG/1
100 TABLET ORAL DAILY
Status: ON HOLD | COMMUNITY
End: 2023-02-25

## 2023-02-20 RX ORDER — LOSARTAN POTASSIUM 50 MG/1
100 TABLET ORAL DAILY
Status: DISCONTINUED | OUTPATIENT
Start: 2023-02-21 | End: 2023-02-25 | Stop reason: HOSPADM

## 2023-02-20 RX ORDER — AMOXICILLIN 250 MG
2 CAPSULE ORAL 2 TIMES DAILY
Status: DISCONTINUED | OUTPATIENT
Start: 2023-02-20 | End: 2023-02-25 | Stop reason: HOSPADM

## 2023-02-20 RX ORDER — BISACODYL 10 MG
10 SUPPOSITORY, RECTAL RECTAL
Status: DISCONTINUED | OUTPATIENT
Start: 2023-02-20 | End: 2023-02-25 | Stop reason: HOSPADM

## 2023-02-20 RX ORDER — HYDROCHLOROTHIAZIDE 50 MG/1
50 TABLET ORAL DAILY
Status: ON HOLD | COMMUNITY
End: 2023-02-25

## 2023-02-20 RX ORDER — HEPARIN SODIUM 5000 [USP'U]/100ML
0-30 INJECTION, SOLUTION INTRAVENOUS CONTINUOUS
Status: DISCONTINUED | OUTPATIENT
Start: 2023-02-20 | End: 2023-02-21

## 2023-02-20 RX ORDER — POLYETHYLENE GLYCOL 3350 17 G/17G
1 POWDER, FOR SOLUTION ORAL
Status: DISCONTINUED | OUTPATIENT
Start: 2023-02-20 | End: 2023-02-25 | Stop reason: HOSPADM

## 2023-02-20 RX ORDER — OMEPRAZOLE 20 MG/1
40 CAPSULE, DELAYED RELEASE ORAL DAILY
Status: DISCONTINUED | OUTPATIENT
Start: 2023-02-21 | End: 2023-02-25 | Stop reason: HOSPADM

## 2023-02-20 RX ADMIN — HEPARIN SODIUM 12 UNITS/KG/HR: 5000 INJECTION, SOLUTION INTRAVENOUS at 23:08

## 2023-02-20 RX ADMIN — SENNOSIDES AND DOCUSATE SODIUM 2 TABLET: 50; 8.6 TABLET ORAL at 23:33

## 2023-02-20 ASSESSMENT — LIFESTYLE VARIABLES
HAVE PEOPLE ANNOYED YOU BY CRITICIZING YOUR DRINKING: NO
TOTAL SCORE: 0
ON A TYPICAL DAY WHEN YOU DRINK ALCOHOL HOW MANY DRINKS DO YOU HAVE: 0
TOTAL SCORE: 0
TOTAL SCORE: 0
EVER HAD A DRINK FIRST THING IN THE MORNING TO STEADY YOUR NERVES TO GET RID OF A HANGOVER: NO
HAVE YOU EVER FELT YOU SHOULD CUT DOWN ON YOUR DRINKING: NO
AVERAGE NUMBER OF DAYS PER WEEK YOU HAVE A DRINK CONTAINING ALCOHOL: 0
CONSUMPTION TOTAL: NEGATIVE
EVER FELT BAD OR GUILTY ABOUT YOUR DRINKING: NO
ALCOHOL_USE: NO
HOW MANY TIMES IN THE PAST YEAR HAVE YOU HAD 5 OR MORE DRINKS IN A DAY: 0
DOES PATIENT WANT TO STOP DRINKING: CANNOT ASSESS

## 2023-02-20 ASSESSMENT — COGNITIVE AND FUNCTIONAL STATUS - GENERAL
SUGGESTED CMS G CODE MODIFIER DAILY ACTIVITY: CH
DAILY ACTIVITIY SCORE: 24
SUGGESTED CMS G CODE MODIFIER MOBILITY: CH
MOBILITY SCORE: 24

## 2023-02-20 ASSESSMENT — ENCOUNTER SYMPTOMS
ABDOMINAL PAIN: 0
EYE PAIN: 0
CHILLS: 0
INSOMNIA: 0
DIZZINESS: 0
VOMITING: 0
BLURRED VISION: 0
HEADACHES: 0
COUGH: 0
NERVOUS/ANXIOUS: 0
BACK PAIN: 0
PALPITATIONS: 0
NAUSEA: 0
FEVER: 0
SHORTNESS OF BREATH: 0

## 2023-02-20 ASSESSMENT — PATIENT HEALTH QUESTIONNAIRE - PHQ9
1. LITTLE INTEREST OR PLEASURE IN DOING THINGS: NOT AT ALL
2. FEELING DOWN, DEPRESSED, IRRITABLE, OR HOPELESS: NOT AT ALL
SUM OF ALL RESPONSES TO PHQ9 QUESTIONS 1 AND 2: 0

## 2023-02-20 ASSESSMENT — FIBROSIS 4 INDEX: FIB4 SCORE: 1.22

## 2023-02-21 ENCOUNTER — APPOINTMENT (OUTPATIENT)
Dept: CARDIOLOGY | Facility: MEDICAL CENTER | Age: 63
DRG: 282 | End: 2023-02-21
Attending: INTERNAL MEDICINE
Payer: COMMERCIAL

## 2023-02-21 LAB
ANION GAP SERPL CALC-SCNC: 10 MMOL/L (ref 7–16)
BUN SERPL-MCNC: 18 MG/DL (ref 8–22)
CALCIUM SERPL-MCNC: 9 MG/DL (ref 8.5–10.5)
CHLORIDE SERPL-SCNC: 102 MMOL/L (ref 96–112)
CO2 SERPL-SCNC: 25 MMOL/L (ref 20–33)
CREAT SERPL-MCNC: 0.79 MG/DL (ref 0.5–1.4)
EKG IMPRESSION: NORMAL
ERYTHROCYTE [DISTWIDTH] IN BLOOD BY AUTOMATED COUNT: 40.7 FL (ref 35.9–50)
GFR SERPLBLD CREATININE-BSD FMLA CKD-EPI: 99 ML/MIN/1.73 M 2
GLUCOSE BLD STRIP.AUTO-MCNC: 142 MG/DL (ref 65–99)
GLUCOSE BLD STRIP.AUTO-MCNC: 172 MG/DL (ref 65–99)
GLUCOSE BLD STRIP.AUTO-MCNC: 197 MG/DL (ref 65–99)
GLUCOSE SERPL-MCNC: 162 MG/DL (ref 65–99)
HCT VFR BLD AUTO: 43.8 % (ref 42–52)
HGB BLD-MCNC: 15.5 G/DL (ref 14–18)
INR PPP: 2.04 (ref 0.87–1.13)
LV EJECT FRACT  99904: 55
LV EJECT FRACT MOD 2C 99903: 54.79
LV EJECT FRACT MOD 4C 99902: 43.79
LV EJECT FRACT MOD BP 99901: 48.43
MAGNESIUM SERPL-MCNC: 1.8 MG/DL (ref 1.5–2.5)
MCH RBC QN AUTO: 32.5 PG (ref 27–33)
MCHC RBC AUTO-ENTMCNC: 35.4 G/DL (ref 33.7–35.3)
MCV RBC AUTO: 91.8 FL (ref 81.4–97.8)
PLATELET # BLD AUTO: 190 K/UL (ref 164–446)
PMV BLD AUTO: 10.2 FL (ref 9–12.9)
POTASSIUM SERPL-SCNC: 3.6 MMOL/L (ref 3.6–5.5)
PROTHROMBIN TIME: 22.4 SEC (ref 12–14.6)
RBC # BLD AUTO: 4.77 M/UL (ref 4.7–6.1)
SODIUM SERPL-SCNC: 137 MMOL/L (ref 135–145)
UFH PPP CHRO-ACNC: 0.12 IU/ML
UFH PPP CHRO-ACNC: 0.29 IU/ML
UFH PPP CHRO-ACNC: 0.45 IU/ML
WBC # BLD AUTO: 6 K/UL (ref 4.8–10.8)

## 2023-02-21 PROCEDURE — 99233 SBSQ HOSP IP/OBS HIGH 50: CPT | Performed by: INTERNAL MEDICINE

## 2023-02-21 PROCEDURE — 93010 ELECTROCARDIOGRAM REPORT: CPT | Performed by: INTERNAL MEDICINE

## 2023-02-21 PROCEDURE — A9270 NON-COVERED ITEM OR SERVICE: HCPCS

## 2023-02-21 PROCEDURE — 93306 TTE W/DOPPLER COMPLETE: CPT

## 2023-02-21 PROCEDURE — 99222 1ST HOSP IP/OBS MODERATE 55: CPT | Mod: GC | Performed by: INTERNAL MEDICINE

## 2023-02-21 PROCEDURE — 85027 COMPLETE CBC AUTOMATED: CPT

## 2023-02-21 PROCEDURE — 700102 HCHG RX REV CODE 250 W/ 637 OVERRIDE(OP)

## 2023-02-21 PROCEDURE — 82962 GLUCOSE BLOOD TEST: CPT | Mod: 91

## 2023-02-21 PROCEDURE — 85610 PROTHROMBIN TIME: CPT

## 2023-02-21 PROCEDURE — 93306 TTE W/DOPPLER COMPLETE: CPT | Mod: 26 | Performed by: INTERNAL MEDICINE

## 2023-02-21 PROCEDURE — 80048 BASIC METABOLIC PNL TOTAL CA: CPT

## 2023-02-21 PROCEDURE — 770020 HCHG ROOM/CARE - TELE (206)

## 2023-02-21 PROCEDURE — 85520 HEPARIN ASSAY: CPT

## 2023-02-21 PROCEDURE — 700111 HCHG RX REV CODE 636 W/ 250 OVERRIDE (IP): Performed by: INTERNAL MEDICINE

## 2023-02-21 PROCEDURE — A9270 NON-COVERED ITEM OR SERVICE: HCPCS | Performed by: INTERNAL MEDICINE

## 2023-02-21 PROCEDURE — 83735 ASSAY OF MAGNESIUM: CPT

## 2023-02-21 PROCEDURE — 36415 COLL VENOUS BLD VENIPUNCTURE: CPT

## 2023-02-21 PROCEDURE — 700102 HCHG RX REV CODE 250 W/ 637 OVERRIDE(OP): Performed by: INTERNAL MEDICINE

## 2023-02-21 RX ORDER — HEPARIN SODIUM 5000 [USP'U]/100ML
0-30 INJECTION, SOLUTION INTRAVENOUS CONTINUOUS
Status: DISCONTINUED | OUTPATIENT
Start: 2023-02-21 | End: 2023-02-22

## 2023-02-21 RX ORDER — HEPARIN SODIUM 1000 [USP'U]/ML
2000 INJECTION, SOLUTION INTRAVENOUS; SUBCUTANEOUS PRN
Status: DISCONTINUED | OUTPATIENT
Start: 2023-02-21 | End: 2023-02-22

## 2023-02-21 RX ORDER — ROSUVASTATIN CALCIUM 20 MG/1
40 TABLET, COATED ORAL EVERY EVENING
Status: DISCONTINUED | OUTPATIENT
Start: 2023-02-21 | End: 2023-02-25 | Stop reason: HOSPADM

## 2023-02-21 RX ORDER — SODIUM CHLORIDE 9 MG/ML
INJECTION, SOLUTION INTRAVENOUS CONTINUOUS
Status: DISCONTINUED | OUTPATIENT
Start: 2023-02-22 | End: 2023-02-22

## 2023-02-21 RX ADMIN — INSULIN HUMAN 1 UNITS: 100 INJECTION, SOLUTION PARENTERAL at 21:39

## 2023-02-21 RX ADMIN — HEPARIN SODIUM 18 UNITS/KG/HR: 5000 INJECTION, SOLUTION INTRAVENOUS at 16:29

## 2023-02-21 RX ADMIN — OMEPRAZOLE 40 MG: 20 CAPSULE, DELAYED RELEASE ORAL at 17:10

## 2023-02-21 RX ADMIN — LOSARTAN POTASSIUM 100 MG: 50 TABLET, FILM COATED ORAL at 05:58

## 2023-02-21 RX ADMIN — ROSUVASTATIN CALCIUM 40 MG: 20 TABLET, FILM COATED ORAL at 17:10

## 2023-02-21 RX ADMIN — HEPARIN SODIUM 2000 UNITS: 1000 INJECTION, SOLUTION INTRAVENOUS; SUBCUTANEOUS at 14:34

## 2023-02-21 RX ADMIN — HEPARIN SODIUM 18 UNITS/KG/HR: 5000 INJECTION, SOLUTION INTRAVENOUS at 14:33

## 2023-02-21 ASSESSMENT — ENCOUNTER SYMPTOMS
PALPITATIONS: 0
CHILLS: 0
ABDOMINAL PAIN: 0
VOMITING: 0
BLURRED VISION: 0
BACK PAIN: 0
EYE PAIN: 0
DIZZINESS: 0
HEADACHES: 0
SHORTNESS OF BREATH: 0
FEVER: 0
COUGH: 0
NAUSEA: 0
INSOMNIA: 0
NERVOUS/ANXIOUS: 0

## 2023-02-21 ASSESSMENT — PAIN DESCRIPTION - PAIN TYPE
TYPE: ACUTE PAIN
TYPE: ACUTE PAIN

## 2023-02-21 ASSESSMENT — FIBROSIS 4 INDEX: FIB4 SCORE: 1.51

## 2023-02-21 NOTE — CONSULTS
Reason of Consult: NSTEMI    Consulting Physician: Griffin Mansfield MD    Resident Physician: Edwardo Bliss MD    HPI:  Patient patient is a 62M with PMHx of HTN, hx of PE in April 2022 (on warfarin), prediabetes, and barretts esophagus, who presented to [facility] yesterday with substernal chest pain and tightness.  Patient reports that he was at work yesterday when he started feeling flushed with pain in the center of his chest followed by tightness.  Patient denies shortness of breath, cough, lightheadedness/dizziness, confusion.    Patient plan an episode of chest pain 10-15 years ago, saw cardiologist and work-up including treadmill stress test was negative at that time.  In April 2022, patient and DVT/PE with swelling worse in his right lower extremity, and mild chest pain at that time.  He was initially started on Lovenox for DVT, but then was found to have the chest tightness with PE.  He was transitioned to warfarin.  Since the PE, he has had less energy but it has been improving gradually over time.    Patient reports that at home he is able to work outside in his yard all day, although he does take frequent breaks to rest to get his energy back.  While exerting himself, he denies chest pain or shortness of breath.  He reports that he had some lower extremity edema mostly on the right lower extremity ever since the DVT/PE.  This edema has improved over time.  He denies orthopnea.    Past Medical History:   Diagnosis Date    Asthma     COVID-19     Pneumonia        Social History     Socioeconomic History    Marital status: Single     Spouse name: Not on file    Number of children: Not on file    Years of education: Not on file    Highest education level: Not on file   Occupational History    Not on file   Tobacco Use    Smoking status: Never    Smokeless tobacco: Never   Substance and Sexual Activity    Alcohol use: Never    Drug use: Never    Sexual activity: Not on file   Other Topics  Concern    Not on file   Social History Narrative    Not on file     Social Determinants of Health     Financial Resource Strain: Not on file   Food Insecurity: Not on file   Transportation Needs: Not on file   Physical Activity: Not on file   Stress: Not on file   Social Connections: Not on file   Intimate Partner Violence: Not on file   Housing Stability: Not on file       No current facility-administered medications on file prior to encounter.     Current Outpatient Medications on File Prior to Encounter   Medication Sig Dispense Refill    metFORMIN (GLUCOPHAGE) 500 MG Tab Take 500 mg by mouth 2 times a day with meals.      losartan (COZAAR) 50 MG Tab Take 100 mg by mouth every day.      hydroCHLOROthiazide (HYDRODIURIL) 50 MG Tab Take 50 mg by mouth every day.      omeprazole (PRILOSEC) 40 MG delayed-release capsule Take 40 mg by mouth every day.      warfarin (COUMADIN) 5 MG Tab Take 1-1.5 Tablets by mouth every day. As directed by [facility] 135 Tablet 1       Current Facility-Administered Medications   Medication Dose Frequency Provider Last Rate Last Admin    senna-docusate (PERICOLACE or SENOKOT S) 8.6-50 MG per tablet 2 Tablet  2 Tablet BID Roman Espinosa M.D.   2 Tablet at 02/20/23 2333    And    polyethylene glycol/lytes (MIRALAX) PACKET 1 Packet  1 Packet QDAY PRMATTHEW Espinosa M.D.        And    magnesium hydroxide (MILK OF MAGNESIA) suspension 30 mL  30 mL QDAY PRN Roman Espinosa M.D.        And    bisacodyl (DULCOLAX) suppository 10 mg  10 mg QDAY PRN Roman Espinosa M.D.        ondansetron (ZOFRAN ODT) dispertab 4 mg  4 mg Q4HRS PRMATTHEW Espinosa M.D.        promethazine (PHENERGAN) tablet 12.5-25 mg  12.5-25 mg Q4HRS PRMATTHEW Espinosa M.D.        promethazine (PHENERGAN) suppository 12.5-25 mg  12.5-25 mg Q4HRS PRMATTHEW Espinosa M.D.        prochlorperazine (COMPAZINE) injection 5-10 mg  5-10 mg Q4HRS PRMATTHEW Espinosa M.D.         "insulin regular (HumuLIN R,NovoLIN R) injection  1-6 Units 4X/DAY ACHS Roman Espinosa M.D.        And    dextrose 10 % BOLUS 25 g  25 g Q15 MIN PRN Roman Espinosa M.D.        heparin infusion 25,000 units in 500 mL 0.45% NACL  0-30 Units/kg/hr (Adjusted) Continuous Roman Espinosa M.D. 28.7 mL/hr at 02/21/23 0658 16 Units/kg/hr at 02/21/23 0658    losartan (COZAAR) tablet 100 mg  100 mg DAILY Roman Espinosa M.D.   100 mg at 02/21/23 0558    omeprazole (PRILOSEC) capsule 40 mg  40 mg DAILY Roman Espinosa M.D.       Last reviewed on 2/20/2023  9:27 PM by Piedad King R.N.     Patient has no known allergies.    No family history on file.    Constitutional: Denies fevers  Eyes: Denies changes in vision  Ears/Nose/Throat/Mouth: Recent nasal congestion for a couple weeks  Cardiovascular: CP as above, no palpitations   Respiratory: no shortness of breath , denies cough  Gastrointestinal/Hepatic: Denies abdominal pain, nausea, vomiting, diarrhea, constipation or GI bleeding   Genitourinary: Denies dysuria or frequency  Musculoskeletal/Rheum: Denies  joint pain and swelling, edema as above  Skin: Occasional rash on posterior calves being treated outpatient  Neurological: Denies headache, confusion, memory loss or focal weakness/parasthesias  Psychiatric: denies mood disorder   Endocrine: Pt reports normal thyroid hormone levels       Physical Exam   Blood pressure 118/68, pulse (!) 57, temperature 37.2 °C (99 °F), temperature source Temporal, resp. rate 17, height 1.778 m (5' 10\"), weight 115 kg (252 lb 6.8 oz), SpO2 94 %.    Constitutional: NAD, laying in bed comfortably, conversational, sits up for exam on his own using bed rails   HENT: Normocephalic and atraumatic. No scleral icterus.   Neck: No JVD present.   Cardiovascular: Normal rate. Exam reveals no gallop and no friction rub. No murmur heard.   Pulmonary/Chest: CTAB bilaterally. Normal WOB.  Abdominal: S/ND BS+ , mild TTP " RLQ  Musculoskeletal:  Pulses present. No atrophy. Strength normal.  Extremities: Exhibits no edema. No clubbing or cyanosis.   Skin: Skin is warm and dry.   Neuro: Non-focal, CN 2-12 intact grossly      Intake/Output Summary (Last 24 hours) at 2/21/2023 1116  Last data filed at 2/21/2023 0000  Gross per 24 hour   Intake 300 ml   Output --   Net 300 ml       Recent Labs     02/20/23 2220 02/21/23  0529   WBC 7.3 6.0   RBC 4.78 4.77   HEMOGLOBIN 15.7 15.5   HEMATOCRIT 43.6 43.8   MCV 91.2 91.8   MCH 32.8 32.5   MCHC 36.0* 35.4*   RDW 40.0 40.7   PLATELETCT 205 190   MPV 9.8 10.2     Recent Labs     02/20/23 2220 02/21/23  0529   SODIUM 134* 137   POTASSIUM 3.4* 3.6   CHLORIDE 98 102   CO2 26 25   GLUCOSE 148* 162*   BUN 18 18   CREATININE 0.63 0.79   CALCIUM 9.4 9.0     Recent Labs     02/20/23 2220 02/21/23  0907   APTT 42.3*  --    INR 2.13* 2.04*             Recent Labs     02/20/23 2220   TRIGLYCERIDE 201*   HDL 35*   *       EKG (2/20/23):  EKG shows Sinus rhythm, no STEMI    Imaging reviewed    Impressions:  #NSTEMI  #Chest Pain  #Chronic Hypertension  #Hx of unprovoked DVT/PE    62-year-old male with chest pain/tightness starting yesterday, transferred last night from [facility] due to concern for NSTEMI.  Troponin was elevated at [facility] (390, normal is 60), at [facility] troponin also elevated at 70.  EKG at [facility] reportedly with Q waves in V1 and V2.  CTA chest in [place] was negative.  EKG at [facility] normal sinus rhythm.  Patient was given aspirin, started on heparin drip, statin.    Pt reports PE in April 2022, workup was not able to find provoking factor. Lower extremity swelling has improved gradually since then, as has his exercise tolerance. He is on warfarin anticoagulation (INR 2.0 here), unknown duration. Pt has FHx of DM2 in father, mother with hx of polio, sibling with heart valve replacement at 61 y/o, and paternal grandfather with heart attack  following chemotherapy in his late 70's.    We spoke with the patient about NSTEMI and options for treatment; pt agrees to L heart cath. Procedure will be scheduled for tomorrow.    Recommendations:  - Continue Heparin Drip  - High intensity statin  - Continue losartan  - Hold beta blockers due to relative bradycardia, may consider starting outpatient  - L Heart Cath tomorrow morning   - Pt can eat this afternoon, NPO at midnight  - Holding ASA for now, pending results of angiogram    Edwardo Bliss MD, PGY-2  [facility] Resident

## 2023-02-21 NOTE — PROGRESS NOTES
Patient with new onset chest pain rating 2/10 substernal, similar to his initial chest pain that brought him into the hospital. No oxygen needs, blood pressure 136/76. Reported to provider Sedrick Perez who authorized stat EKG. Order placed, EKG tech now at bedside.     EKG showed NSR HR 65, reported to provider SUSAN perez. No new orders. Patient stated he feels the chest pain is subsiding a little.

## 2023-02-21 NOTE — DISCHARGE PLANNING
Care Transition Team Assessment    RN JEN spoke with patient at bedside. Role of CM explained. Demographic info verified. Patient lives alone in [place], [place]. Verbalizes he is independent with all ADL's and drives. No HH services. No DME other than a glucose monitor. Patient states he will have a friend pick him up upon discharge. No CM needs identified.     Information Source  Orientation Level: Oriented X4  Information Given By: Patient  Who is responsible for making decisions for patient? : Patient    Elopement Risk  Legal Hold: No    Interdisciplinary Discharge Planning  Lives with - Patient's Self Care Capacity: Alone and Able to Care For Self  Patient or legal guardian wants to designate a caregiver: No  Support Systems: Friends / Neighbors, Family Member(s)  Housing / Facility: 1 Story House  Able to Return to Previous ADL's: Yes  Mobility Issues: No  Prior Services: Home-Independent  Assistance Needed: No  Durable Medical Equipment: Other - Specify (glucose monitor)    Discharge Preparedness  What is your plan after discharge?: Home with help  What are your discharge supports?: Parent  Prior Functional Level: Independent with Activities of Daily Living  Difficulity with ADLs: None  Difficulity with IADLs: None    Functional Assesment  Prior Functional Level: Independent with Activities of Daily Living    Vision / Hearing Impairment  Vision Impairment : Yes  Right Eye Vision: Wears Glasses  Left Eye Vision: Wears Glasses  Hearing Impairment : Yes  Hearing Impairment: Right Ear, Left Ear  Does Pt Need Special Equipment for the Hearing Impaired?: No    Domestic Abuse  Have you ever been the victim of abuse or violence?: No  Physical Abuse or Sexual Abuse: No  Verbal Abuse or Emotional Abuse: No  Possible Abuse/Neglect Reported to:: Not Applicable    Anticipated Discharge Information  Discharge Disposition: Discharged to home/self care (01)

## 2023-02-21 NOTE — PROGRESS NOTES
Report received from [facility] shift RN, assumed patient care. Patient is calmly resting in bed, no signs of distress, even and unlabored breathing noted. Pt on room air. Pt has no chest pain. Tele box on and in place. Patient has call light within reach, fall precautions in place. Will continue to monitor.

## 2023-02-21 NOTE — PROGRESS NOTES
4 Eyes Skin Assessment Completed by Piedad RN and Eliezer, RN.    Head WDL  Ears WDL  Nose WDL  Mouth WDL  Neck WDL  Breast/Chest WDL  Shoulder Blades WDL  Spine WDL  (R) Arm/Elbow/Hand WDL  (L) Arm/Elbow/Hand WDL  Abdomen WDL  Groin WDL  Scrotum/Coccyx/Buttocks WDL  (R) Leg WDL  (L) Leg WDL  (R) Heel/Foot/Toe WDL  (L) Heel/Foot/Toe WDL          Devices In Places Tele Box and Blood Pressure Cuff      Interventions In Place Pressure Redistribution Mattress    Possible Skin Injury No    Pictures Uploaded Into Epic N/A  Wound Consult Placed N/A  RN Wound Prevention Protocol Ordered No

## 2023-02-21 NOTE — PROGRESS NOTES
Date of Service  2/20/2023    [facility] HOSPITALIST TRIAGE OFFICER DIRECT ADMISSION REPORT    Transferring facility: [facility]  Transferring physician: Dr. Guzman  Transferring facility/physician contact number: [facility] [phone]    Reason for transfer: NSTEMI, chest pain    Chief complaint: chest pain    Pertinent history & patient course:     NSTEMI    chest pain, pressure, tightness  Trop 390 (normal 60)  no ECG changes, Q-waves in V1, V2    Aspirin, hep gtt  Adding statin    hx of PE, now on warafarin failed DOAC  CTA chest negative    Code Status: FULL CODE per transferring provider, I personally verified with the transferring provider patient's code status and the transferring provider has confirmed this with the patient.    Further work up or recommendations per triage officer prior to transfer: none    Consultants called prior to transfer and pertinent input from consultants: none    Consultants to be called upon arrival: CARDIOLOGY    Patient accepted for transfer: Yes  Admission status: Inpatient.   Floor requested: TELEMETRY      When patient arrives to assigned room at [facility], please reach out to RTOC/bed control for Hospitalist assignment, admission and further orders.

## 2023-02-21 NOTE — PROGRESS NOTES
Hospital Medicine Daily Progress Note    Date of Service  2/21/2023    Chief Complaint  Jayce Espino is a 62 y.o. male admitted 2/20/2023 with chest pain     Hospital Course  This is a 62 y.o. male PMH HTN, hx of PE, now on warafarin failed DOAC, Moody's, hiatal hernia who presented 2/20/2023 with chest pain to outside facility where he was found also to have an elevated troponin he was given aspirin and transferred  here for cardiology evaluation     Interval Problem Update  Patient seen and examined , afebrile,  cont on heparin drip for possible NSTEMI. I have consulted cardiology today appreciate rec.  Close monitoring on tele     I have discussed this patient's plan of care and discharge plan at IDT rounds today with Case Management, Nursing, Nursing leadership, and other members of the IDT team.    Consultants/Specialty  cardiology    Code Status  Full Code    Disposition  Patient is not medically cleared for discharge.   Anticipate discharge to to home with close outpatient follow-up.  I have placed the appropriate orders for post-discharge needs.    Review of Systems  Review of Systems   Constitutional:  Negative for chills and fever.   HENT:  Negative for congestion and hearing loss.    Eyes:  Negative for blurred vision and pain.   Respiratory:  Negative for cough and shortness of breath.    Cardiovascular:  Negative for chest pain and palpitations.   Gastrointestinal:  Negative for abdominal pain, nausea and vomiting.   Genitourinary:  Negative for dysuria and hematuria.   Musculoskeletal:  Negative for back pain and joint pain.   Skin:  Negative for itching and rash.   Neurological:  Negative for dizziness and headaches.   Psychiatric/Behavioral:  The patient is not nervous/anxious and does not have insomnia.    All other systems reviewed and are negative.     Physical Exam  Temp:  [36.2 °C (97.1 °F)-37.2 °C (99 °F)] 36.2 °C (97.1 °F)  Pulse:  [57-68] 64  Resp:  [17-18] 17  BP: (102-145)/(52-76)  121/72  SpO2:  [92 %-94 %] 92 %    Physical Exam  Vitals and nursing note reviewed.   Constitutional:       General: He is not in acute distress.     Appearance: He is obese. He is not diaphoretic.   HENT:      Head: Normocephalic and atraumatic.      Right Ear: External ear normal.      Left Ear: External ear normal.      Nose: Nose normal. No congestion.   Eyes:      General: No scleral icterus.     Extraocular Movements: Extraocular movements intact.   Cardiovascular:      Rate and Rhythm: Normal rate and regular rhythm.      Pulses: Normal pulses.      Heart sounds: Normal heart sounds. No murmur heard.     Comments: Difficult to auscultate due to body habitus, distant heart sounds  Pulmonary:      Effort: Pulmonary effort is normal. No respiratory distress.      Breath sounds: Normal breath sounds. No wheezing or rales.      Comments: Difficult to auscultate due to body habitus  Abdominal:      General: Abdomen is flat. Bowel sounds are normal. There is no distension.      Palpations: Abdomen is soft.      Tenderness: There is no abdominal tenderness. There is no guarding or rebound.   Musculoskeletal:         General: No swelling or tenderness. Normal range of motion.      Cervical back: Normal range of motion and neck supple.   Skin:     General: Skin is warm.      Capillary Refill: Capillary refill takes less than 2 seconds.      Coloration: Skin is not jaundiced or pale.      Findings: No erythema.   Neurological:      General: No focal deficit present.      Mental Status: He is alert and oriented to person, place, and time. Mental status is at baseline.      Motor: No weakness.   Psychiatric:         Mood and Affect: Mood normal.         Behavior: Behavior normal.         Thought Content: Thought content normal.         Judgment: Judgment normal.       Fluids    Intake/Output Summary (Last 24 hours) at 2/21/2023 1314  Last data filed at 2/21/2023 0000  Gross per 24 hour   Intake 300 ml   Output --   Net  300 ml       Laboratory  Recent Labs     02/20/23 2220 02/21/23  0529   WBC 7.3 6.0   RBC 4.78 4.77   HEMOGLOBIN 15.7 15.5   HEMATOCRIT 43.6 43.8   MCV 91.2 91.8   MCH 32.8 32.5   MCHC 36.0* 35.4*   RDW 40.0 40.7   PLATELETCT 205 190   MPV 9.8 10.2     Recent Labs     02/20/23 2220 02/21/23  0529   SODIUM 134* 137   POTASSIUM 3.4* 3.6   CHLORIDE 98 102   CO2 26 25   GLUCOSE 148* 162*   BUN 18 18   CREATININE 0.63 0.79   CALCIUM 9.4 9.0     Recent Labs     02/20/23 2220 02/21/23  0907   APTT 42.3*  --    INR 2.13* 2.04*         Recent Labs     02/20/23 2220   TRIGLYCERIDE 201*   HDL 35*   *       Imaging  EC-ECHOCARDIOGRAM COMPLETE W/O CONT   Final Result           Assessment/Plan  * NSTEMI (non-ST elevated myocardial infarction) (HCC)- (present on admission)  Assessment & Plan  Typical chest pain  NSTEMI, troponin was highly elevated 390 at outside  - continue hep gtt  - asa, statin continue  - continue home losartan  - hold warfarin  - trending troponins  - monitor on telemetry  cardiology consulted appreciate rec.     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)  Assessment & Plan  With hiatal hernia  On chronic omperazole  continue    Prediabetes- (present on admission)  Assessment & Plan   A1c 6.7  Hold metformin,  Recheck A1c, ISS, accuchecks, hypoglycemia protocol    Hypertension- (present on admission)  Assessment & Plan  Cont on losartan   Monitor and adjust as needed       Hx pulmonary embolism- (present on admission)  Assessment & Plan  On warfarin, as outpatient holding for now   Will be on hep gtt  Recheck INR           VTE prophylaxis: therapeutic anticoagulation with heparin drip     I have performed a physical exam and reviewed and updated ROS and Plan today (2/21/2023). In review of yesterday's note (2/20/2023), there are no changes except as documented above.

## 2023-02-21 NOTE — H&P
Hospital Medicine History & Physical Note    Date of Service  2/20/2023    Primary Care Physician  No primary care provider on file.    Consultants  cardiology  Specialist Names: Dr. Damon    Code Status  Full Code    Chief Complaint  Transfer for NSTEMI    History of Presenting Illness  Jayce Espino is a 62 y.o. male PMH HTN, hx of PE, now on warafarin failed DOAC, Moody's, hiatal hernia who presented 2/20/2023 with chest pain, onset at work 2PM, had chest pain substernal, then pressure occurred, with tightness, SOB, flushed, warm to touch. Works as .  Blood sugar at lunch was 200, he is on metformin for prediabetes.  Took HCTZ and losartan in the AM, BP controlled but was 158/84. 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 Exercises with stationary bike 5 -10 miles per day.  Denied NSAIDs use, tobacco use, illicit drug use, alcohol use.    Reviewed transfer notes  Trop 391.5 (normal 60)  no ECG changes, Q-waves in V1, V2  Aspirin 324mg, hep gtt  Adding statin  CTA chest negative    I discussed the plan of care with patient, bedside RN, charge RN, , and pharmacy.    Review of Systems  Review of Systems   Constitutional:  Negative for chills and fever.   HENT:  Negative for congestion and hearing loss.    Eyes:  Negative for blurred vision and pain.   Respiratory:  Negative for cough and shortness of breath.    Cardiovascular:  Negative for chest pain and palpitations.   Gastrointestinal:  Negative for abdominal pain, nausea and vomiting.   Genitourinary:  Negative for dysuria and hematuria.   Musculoskeletal:  Negative for back pain and joint pain.   Skin:  Negative for itching and rash.   Neurological:  Negative for dizziness and headaches.   Psychiatric/Behavioral:  The patient is not nervous/anxious and does not have insomnia.    All other systems reviewed and are negative.    Past Medical History   has a past medical history of Asthma, COVID-19, and  Pneumonia. Htn, PE, T2DM    Surgical History   has a past surgical history that includes inguinal hernia repair and other neurological surg.     Family History  family history is not on file.  Grandfather with MI. Younger brother with valve replacement.  Family history reviewed with patient. There is no family history that is pertinent to the chief complaint.    Social History   reports that he has never smoked. He has never used smokeless tobacco. He reports that he does not drink alcohol and does not use drugs.    Allergies  No Known Allergies    Medications  Prior to Admission Medications   Prescriptions Last Dose Informant Patient Reported? Taking?   hydroCHLOROthiazide (HYDRODIURIL) 50 MG Tab 2/20/2023 at 0600  Yes Yes   Sig: Take 50 mg by mouth every day.   losartan (COZAAR) 50 MG Tab 2/20/2023 at 0600  Yes Yes   Sig: Take 100 mg by mouth every day.   metFORMIN (GLUCOPHAGE) 500 MG Tab 2/20/2023 at 0600  Yes Yes   Sig: Take 500 mg by mouth 2 times a day with meals.   omeprazole (PRILOSEC) 40 MG delayed-release capsule 2/19/2023  Yes No   Sig: Take 40 mg by mouth every day.   warfarin (COUMADIN) 5 MG Tab 2/19/2023 at 1800  No No   Sig: Take 1-1.5 Tablets by mouth every day. As directed by [facility]      Facility-Administered Medications: None       Physical Exam  Temp:  [37 °C (98.6 °F)] 37 °C (98.6 °F)  Pulse:  [68] 68  Resp:  [18] 18  BP: (145)/(76) 145/76  SpO2:  [92 %] 92 %  Blood Pressure: (!) 145/76   Temperature: 37 °C (98.6 °F)   Pulse: 68   Respiration: 18   Pulse Oximetry: 92 %       Physical Exam  Vitals and nursing note reviewed.   Constitutional:       General: He is not in acute distress.     Appearance: He is obese. He is not diaphoretic.   HENT:      Head: Normocephalic and atraumatic.      Right Ear: External ear normal.      Left Ear: External ear normal.      Nose: Nose normal. No congestion.   Eyes:      General: No scleral icterus.     Extraocular Movements: Extraocular  movements intact.   Cardiovascular:      Rate and Rhythm: Normal rate and regular rhythm.      Pulses: Normal pulses.      Heart sounds: Normal heart sounds. No murmur heard.     Comments: Difficult to auscultate due to body habitus, distant heart sounds  Pulmonary:      Effort: Pulmonary effort is normal. No respiratory distress.      Breath sounds: Normal breath sounds. No wheezing or rales.      Comments: Difficult to auscultate due to body habitus  Abdominal:      General: Abdomen is flat. Bowel sounds are normal. There is no distension.      Palpations: Abdomen is soft.      Tenderness: There is no abdominal tenderness.   Musculoskeletal:         General: No swelling or tenderness. Normal range of motion.      Cervical back: Normal range of motion and neck supple.   Skin:     General: Skin is warm.      Capillary Refill: Capillary refill takes less than 2 seconds.      Coloration: Skin is not jaundiced or pale.      Findings: No erythema.   Neurological:      General: No focal deficit present.      Mental Status: He is alert and oriented to person, place, and time. Mental status is at baseline.      Motor: No weakness.   Psychiatric:         Mood and Affect: Mood normal.         Behavior: Behavior normal.         Thought Content: Thought content normal.         Judgment: Judgment normal.       Laboratory:          No results for input(s): ALTSGPT, ASTSGOT, ALKPHOSPHAT, TBILIRUBIN, DBILIRUBIN, GAMMAGT, AMYLASE, LIPASE, ALB, PREALBUMIN, GLUCOSE in the last 72 hours.      No results for input(s): NTPROBNP in the last 72 hours.      No results for input(s): TROPONINT in the last 72 hours.    Imaging:  No orders to display       no X-Ray or EKG requiring interpretation    Assessment/Plan:  Justification for Admission Status  I anticipate this patient will require at least two midnights for appropriate medical management, necessitating inpatient admission because patient has acute NSTEMI. He will require cardiology  workup, likely LHC. He will be on heparin gtt, telemetry monitoring, repeat labs.    Patient will need a Telemetry bed on MEDICAL service .  The need is secondary to NSTEMI.    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  continue    Hx pulmonary embolism- (present on admission)  Assessment & Plan  On warfarin, last night taken  Will be on hep gtt  Recheck INR  INR was 2.2, may need vit k      VTE prophylaxis: therapeutic anticoagulation with hep gtt      Patient is critically ill.   I have personally seen and evaluated patient in room T704 as Internal Medicine Hospitalist services.  The patient continues to have: NSTEMI  The vital organ system that is affected is the: Cardiac   If untreated there is a high chance of deterioration into: cardiac arrest and eventually death.  The critical care that I am providing today is: Continuous IV heparin, continuous monitoring of heparin drip, telemetry monitoring, lab monitoring.  The critical that has been undertaken is medically complex.  There has been no overlap in critical care time.  Critical Care Time not including procedures: 31 minutes

## 2023-02-21 NOTE — CARE PLAN
The patient is Watcher - Medium risk of patient condition declining or worsening    Shift Goals  Clinical Goals: Monitor chest pain, heprin gtt, cardiology consult  Patient Goals: Rest  Family Goals: ALANIS      Problem: Knowledge Deficit - Standard  Goal: Patient and family/care givers will demonstrate understanding of plan of care, disease process/condition, diagnostic tests and medications  Outcome: Progressing     Problem: Pain - Standard  Goal: Alleviation of pain or a reduction in pain to the patient’s comfort goal  Outcome: Progressing     Problem: Hemodynamics  Goal: Patient's hemodynamics, fluid balance and neurologic status will be stable or improve  Outcome: Progressing

## 2023-02-21 NOTE — ASSESSMENT & PLAN NOTE
Typical chest pain  NSTEMI, troponin was highly elevated 390 at outside  - asa, statin continue  - continue home losartan  - hold warfarin  - trending troponins  -rodolfo cardiac cath showing  which showed  to D1 with an unsuccessful attempt to open.   Appreciate cardiology rec

## 2023-02-22 ENCOUNTER — APPOINTMENT (OUTPATIENT)
Dept: CARDIOLOGY | Facility: MEDICAL CENTER | Age: 63
DRG: 282 | End: 2023-02-22
Payer: COMMERCIAL

## 2023-02-22 LAB
ANION GAP SERPL CALC-SCNC: 11 MMOL/L (ref 7–16)
BUN SERPL-MCNC: 15 MG/DL (ref 8–22)
CALCIUM SERPL-MCNC: 8.6 MG/DL (ref 8.5–10.5)
CHLORIDE SERPL-SCNC: 105 MMOL/L (ref 96–112)
CO2 SERPL-SCNC: 23 MMOL/L (ref 20–33)
CREAT SERPL-MCNC: 0.75 MG/DL (ref 0.5–1.4)
ERYTHROCYTE [DISTWIDTH] IN BLOOD BY AUTOMATED COUNT: 42 FL (ref 35.9–50)
GFR SERPLBLD CREATININE-BSD FMLA CKD-EPI: 101 ML/MIN/1.73 M 2
GLUCOSE BLD STRIP.AUTO-MCNC: 117 MG/DL (ref 65–99)
GLUCOSE BLD STRIP.AUTO-MCNC: 138 MG/DL (ref 65–99)
GLUCOSE BLD STRIP.AUTO-MCNC: 144 MG/DL (ref 65–99)
GLUCOSE BLD STRIP.AUTO-MCNC: 149 MG/DL (ref 65–99)
GLUCOSE SERPL-MCNC: 150 MG/DL (ref 65–99)
HCT VFR BLD AUTO: 41.7 % (ref 42–52)
HGB BLD-MCNC: 14.7 G/DL (ref 14–18)
INR PPP: 1.65 (ref 0.87–1.13)
MCH RBC QN AUTO: 32.7 PG (ref 27–33)
MCHC RBC AUTO-ENTMCNC: 35.3 G/DL (ref 33.7–35.3)
MCV RBC AUTO: 92.9 FL (ref 81.4–97.8)
PLATELET # BLD AUTO: 180 K/UL (ref 164–446)
PMV BLD AUTO: 9.9 FL (ref 9–12.9)
POTASSIUM SERPL-SCNC: 3.6 MMOL/L (ref 3.6–5.5)
PROTHROMBIN TIME: 19.2 SEC (ref 12–14.6)
RBC # BLD AUTO: 4.49 M/UL (ref 4.7–6.1)
SODIUM SERPL-SCNC: 139 MMOL/L (ref 135–145)
UFH PPP CHRO-ACNC: 0.5 IU/ML
WBC # BLD AUTO: 6.1 K/UL (ref 4.8–10.8)

## 2023-02-22 PROCEDURE — B3101ZZ FLUOROSCOPY OF THORACIC AORTA USING LOW OSMOLAR CONTRAST: ICD-10-PCS | Performed by: INTERNAL MEDICINE

## 2023-02-22 PROCEDURE — 99152 MOD SED SAME PHYS/QHP 5/>YRS: CPT | Performed by: INTERNAL MEDICINE

## 2023-02-22 PROCEDURE — 85520 HEPARIN ASSAY: CPT

## 2023-02-22 PROCEDURE — 700102 HCHG RX REV CODE 250 W/ 637 OVERRIDE(OP)

## 2023-02-22 PROCEDURE — A9270 NON-COVERED ITEM OR SERVICE: HCPCS

## 2023-02-22 PROCEDURE — 99232 SBSQ HOSP IP/OBS MODERATE 35: CPT | Performed by: INTERNAL MEDICINE

## 2023-02-22 PROCEDURE — B2111ZZ FLUOROSCOPY OF MULTIPLE CORONARY ARTERIES USING LOW OSMOLAR CONTRAST: ICD-10-PCS | Performed by: INTERNAL MEDICINE

## 2023-02-22 PROCEDURE — 700101 HCHG RX REV CODE 250

## 2023-02-22 PROCEDURE — 82962 GLUCOSE BLOOD TEST: CPT

## 2023-02-22 PROCEDURE — 700105 HCHG RX REV CODE 258

## 2023-02-22 PROCEDURE — 02JA3ZZ INSPECTION OF HEART, PERCUTANEOUS APPROACH: ICD-10-PCS | Performed by: INTERNAL MEDICINE

## 2023-02-22 PROCEDURE — 99232 SBSQ HOSP IP/OBS MODERATE 35: CPT | Mod: 25,GC | Performed by: INTERNAL MEDICINE

## 2023-02-22 PROCEDURE — 92943 PRQ TRLUML REVSC CH OCC ANT: CPT | Mod: 53,LD | Performed by: INTERNAL MEDICINE

## 2023-02-22 PROCEDURE — 700111 HCHG RX REV CODE 636 W/ 250 OVERRIDE (IP)

## 2023-02-22 PROCEDURE — 700102 HCHG RX REV CODE 250 W/ 637 OVERRIDE(OP): Performed by: INTERNAL MEDICINE

## 2023-02-22 PROCEDURE — 85027 COMPLETE CBC AUTOMATED: CPT

## 2023-02-22 PROCEDURE — 770020 HCHG ROOM/CARE - TELE (206)

## 2023-02-22 PROCEDURE — 80048 BASIC METABOLIC PNL TOTAL CA: CPT

## 2023-02-22 PROCEDURE — 700111 HCHG RX REV CODE 636 W/ 250 OVERRIDE (IP): Performed by: INTERNAL MEDICINE

## 2023-02-22 PROCEDURE — 36415 COLL VENOUS BLD VENIPUNCTURE: CPT

## 2023-02-22 PROCEDURE — 99153 MOD SED SAME PHYS/QHP EA: CPT

## 2023-02-22 PROCEDURE — 85610 PROTHROMBIN TIME: CPT

## 2023-02-22 PROCEDURE — 4A023N7 MEASUREMENT OF CARDIAC SAMPLING AND PRESSURE, LEFT HEART, PERCUTANEOUS APPROACH: ICD-10-PCS | Performed by: INTERNAL MEDICINE

## 2023-02-22 PROCEDURE — 93458 L HRT ARTERY/VENTRICLE ANGIO: CPT | Mod: 26,59 | Performed by: INTERNAL MEDICINE

## 2023-02-22 PROCEDURE — A9270 NON-COVERED ITEM OR SERVICE: HCPCS | Performed by: INTERNAL MEDICINE

## 2023-02-22 PROCEDURE — 93005 ELECTROCARDIOGRAM TRACING: CPT

## 2023-02-22 PROCEDURE — 700117 HCHG RX CONTRAST REV CODE 255: Performed by: INTERNAL MEDICINE

## 2023-02-22 PROCEDURE — B2151ZZ FLUOROSCOPY OF LEFT HEART USING LOW OSMOLAR CONTRAST: ICD-10-PCS | Performed by: INTERNAL MEDICINE

## 2023-02-22 RX ORDER — VERAPAMIL HYDROCHLORIDE 2.5 MG/ML
INJECTION, SOLUTION INTRAVENOUS
Status: COMPLETED
Start: 2023-02-22 | End: 2023-02-22

## 2023-02-22 RX ORDER — ACETAMINOPHEN 325 MG/1
650 TABLET ORAL EVERY 6 HOURS PRN
Status: DISCONTINUED | OUTPATIENT
Start: 2023-02-22 | End: 2023-02-25 | Stop reason: HOSPADM

## 2023-02-22 RX ORDER — ASPIRIN 81 MG/1
TABLET, CHEWABLE ORAL
Status: COMPLETED
Start: 2023-02-22 | End: 2023-02-22

## 2023-02-22 RX ORDER — LIDOCAINE HYDROCHLORIDE 20 MG/ML
INJECTION, SOLUTION INFILTRATION; PERINEURAL
Status: COMPLETED
Start: 2023-02-22 | End: 2023-02-22

## 2023-02-22 RX ORDER — HEPARIN SODIUM 1000 [USP'U]/ML
INJECTION, SOLUTION INTRAVENOUS; SUBCUTANEOUS
Status: COMPLETED
Start: 2023-02-22 | End: 2023-02-22

## 2023-02-22 RX ORDER — BIVALIRUDIN 250 MG/5ML
INJECTION, POWDER, LYOPHILIZED, FOR SOLUTION INTRAVENOUS
Status: COMPLETED
Start: 2023-02-22 | End: 2023-02-22

## 2023-02-22 RX ORDER — HEPARIN SODIUM 200 [USP'U]/100ML
INJECTION, SOLUTION INTRAVENOUS
Status: COMPLETED
Start: 2023-02-22 | End: 2023-02-22

## 2023-02-22 RX ORDER — MORPHINE SULFATE 4 MG/ML
2 INJECTION INTRAVENOUS EVERY 4 HOURS PRN
Status: DISCONTINUED | OUTPATIENT
Start: 2023-02-22 | End: 2023-02-25 | Stop reason: HOSPADM

## 2023-02-22 RX ORDER — MIDAZOLAM HYDROCHLORIDE 1 MG/ML
INJECTION INTRAMUSCULAR; INTRAVENOUS
Status: COMPLETED
Start: 2023-02-22 | End: 2023-02-22

## 2023-02-22 RX ORDER — SODIUM CHLORIDE 9 MG/ML
INJECTION, SOLUTION INTRAVENOUS CONTINUOUS
Status: DISCONTINUED | OUTPATIENT
Start: 2023-02-22 | End: 2023-02-24

## 2023-02-22 RX ADMIN — SENNOSIDES AND DOCUSATE SODIUM 2 TABLET: 50; 8.6 TABLET ORAL at 17:20

## 2023-02-22 RX ADMIN — ACETAMINOPHEN 650 MG: 325 TABLET, FILM COATED ORAL at 20:56

## 2023-02-22 RX ADMIN — MIDAZOLAM HYDROCHLORIDE 1.5 MG: 1 INJECTION, SOLUTION INTRAMUSCULAR; INTRAVENOUS at 14:56

## 2023-02-22 RX ADMIN — ROSUVASTATIN CALCIUM 40 MG: 20 TABLET, FILM COATED ORAL at 17:19

## 2023-02-22 RX ADMIN — SENNOSIDES AND DOCUSATE SODIUM 2 TABLET: 50; 8.6 TABLET ORAL at 05:55

## 2023-02-22 RX ADMIN — LOSARTAN POTASSIUM 100 MG: 50 TABLET, FILM COATED ORAL at 05:55

## 2023-02-22 RX ADMIN — NITROGLYCERIN 10 ML: 20 INJECTION INTRAVENOUS at 14:19

## 2023-02-22 RX ADMIN — HEPARIN SODIUM 1000 ML: 200 INJECTION, SOLUTION INTRAVENOUS at 13:45

## 2023-02-22 RX ADMIN — FENTANYL CITRATE 100 MCG: 50 INJECTION, SOLUTION INTRAMUSCULAR; INTRAVENOUS at 14:43

## 2023-02-22 RX ADMIN — FENTANYL CITRATE 75 MCG: 50 INJECTION, SOLUTION INTRAMUSCULAR; INTRAVENOUS at 14:55

## 2023-02-22 RX ADMIN — SODIUM CHLORIDE: 9 INJECTION, SOLUTION INTRAVENOUS at 00:52

## 2023-02-22 RX ADMIN — MIDAZOLAM HYDROCHLORIDE 2 MG: 1 INJECTION, SOLUTION INTRAMUSCULAR; INTRAVENOUS at 14:43

## 2023-02-22 RX ADMIN — LIDOCAINE HYDROCHLORIDE: 20 INJECTION, SOLUTION INFILTRATION; PERINEURAL at 14:19

## 2023-02-22 RX ADMIN — HEPARIN SODIUM: 1000 INJECTION, SOLUTION INTRAVENOUS; SUBCUTANEOUS at 13:45

## 2023-02-22 RX ADMIN — ASPIRIN 81 MG 324 MG: 81 TABLET ORAL at 14:19

## 2023-02-22 RX ADMIN — BIVALIRUDIN 250 MG: 250 INJECTION, POWDER, LYOPHILIZED, FOR SOLUTION INTRAVENOUS at 14:45

## 2023-02-22 RX ADMIN — LIDOCAINE HYDROCHLORIDE 15 ML: 20 SOLUTION OROPHARYNGEAL at 20:57

## 2023-02-22 RX ADMIN — IOHEXOL 195 ML: 350 INJECTION, SOLUTION INTRAVENOUS at 14:59

## 2023-02-22 RX ADMIN — VERAPAMIL HYDROCHLORIDE 5 MG: 2.5 INJECTION, SOLUTION INTRAVENOUS at 14:19

## 2023-02-22 RX ADMIN — HEPARIN SODIUM 18 UNITS/KG/HR: 5000 INJECTION, SOLUTION INTRAVENOUS at 08:10

## 2023-02-22 RX ADMIN — OMEPRAZOLE 40 MG: 20 CAPSULE, DELAYED RELEASE ORAL at 17:19

## 2023-02-22 ASSESSMENT — ENCOUNTER SYMPTOMS
BLURRED VISION: 0
NAUSEA: 0
VOMITING: 0
NERVOUS/ANXIOUS: 0
DIZZINESS: 0
COUGH: 0
FEVER: 0
INSOMNIA: 0
PALPITATIONS: 0
HEADACHES: 0
BACK PAIN: 0
SHORTNESS OF BREATH: 0
CHILLS: 0
ABDOMINAL PAIN: 0
EYE PAIN: 0

## 2023-02-22 ASSESSMENT — PAIN DESCRIPTION - PAIN TYPE
TYPE: ACUTE PAIN
TYPE: ACUTE PAIN

## 2023-02-22 NOTE — PROGRESS NOTES
Hospital Medicine Daily Progress Note    Date of Service  2/22/2023    Chief Complaint  Jayce Espino is a 62 y.o. male admitted 2/20/2023 with chest pain     Hospital Course  This is a 62 y.o. male PMH HTN, hx of PE, now on warafarin failed DOAC, Moody's, hiatal hernia who presented 2/20/2023 with chest pain to outside facility where he was found also to have an elevated troponin he was given aspirin and transferred  here for cardiology evaluation     Interval Problem Update  2/21: Patient seen and examined , afebrile,  cont on heparin drip for possible NSTEMI. I have consulted cardiology today appreciate rec.  Close monitoring on tele   2/22; Patient resting in bed, denies any chest pain this AM, cardiology following and plan is for cardiaca cath today. Appreciate rec.   Cont on heparin drip   Monitor on tele   I have discussed this patient's plan of care and discharge plan at IDT rounds today with Case Management, Nursing, Nursing leadership, and other members of the IDT team.    Consultants/Specialty  cardiology    Code Status  Full Code    Disposition  Patient is not medically cleared for discharge.   Anticipate discharge to to home with close outpatient follow-up.  I have placed the appropriate orders for post-discharge needs.    Review of Systems  Review of Systems   Constitutional:  Negative for chills and fever.   HENT:  Negative for congestion and hearing loss.    Eyes:  Negative for blurred vision and pain.   Respiratory:  Negative for cough and shortness of breath.    Cardiovascular:  Negative for chest pain and palpitations.   Gastrointestinal:  Negative for abdominal pain, nausea and vomiting.   Genitourinary:  Negative for dysuria and hematuria.   Musculoskeletal:  Negative for back pain and joint pain.   Skin:  Negative for itching and rash.   Neurological:  Negative for dizziness and headaches.   Psychiatric/Behavioral:  The patient is not nervous/anxious and does not have insomnia.    All  other systems reviewed and are negative.     Physical Exam  Temp:  [36.2 °C (97.1 °F)-37.4 °C (99.3 °F)] 37.1 °C (98.8 °F)  Pulse:  [53-66] 64  Resp:  [17-18] 18  BP: (109-140)/(52-79) 140/79  SpO2:  [92 %-94 %] 94 %    Physical Exam  Vitals and nursing note reviewed.   Constitutional:       General: He is not in acute distress.     Appearance: He is obese. He is not diaphoretic.   HENT:      Head: Normocephalic and atraumatic.      Right Ear: External ear normal.      Left Ear: External ear normal.      Nose: Nose normal. No congestion.   Eyes:      General: No scleral icterus.     Extraocular Movements: Extraocular movements intact.   Cardiovascular:      Rate and Rhythm: Normal rate and regular rhythm.      Pulses: Normal pulses.      Heart sounds: Normal heart sounds. No murmur heard.     Comments: Difficult to auscultate due to body habitus, distant heart sounds  Pulmonary:      Effort: Pulmonary effort is normal. No respiratory distress.      Breath sounds: Normal breath sounds. No wheezing or rales.      Comments: Difficult to auscultate due to body habitus  Abdominal:      General: Abdomen is flat. Bowel sounds are normal. There is no distension.      Palpations: Abdomen is soft.      Tenderness: There is no abdominal tenderness. There is no guarding or rebound.   Musculoskeletal:         General: No swelling or tenderness. Normal range of motion.      Cervical back: Normal range of motion and neck supple.   Skin:     General: Skin is warm.      Capillary Refill: Capillary refill takes less than 2 seconds.      Coloration: Skin is not jaundiced or pale.      Findings: No erythema.   Neurological:      General: No focal deficit present.      Mental Status: He is alert and oriented to person, place, and time. Mental status is at baseline.      Motor: No weakness.   Psychiatric:         Mood and Affect: Mood normal.         Behavior: Behavior normal.         Thought Content: Thought content normal.          Judgment: Judgment normal.       Fluids  No intake or output data in the 24 hours ending 02/22/23 1136      Laboratory  Recent Labs     02/20/23 2220 02/21/23  0529 02/22/23  0244   WBC 7.3 6.0 6.1   RBC 4.78 4.77 4.49*   HEMOGLOBIN 15.7 15.5 14.7   HEMATOCRIT 43.6 43.8 41.7*   MCV 91.2 91.8 92.9   MCH 32.8 32.5 32.7   MCHC 36.0* 35.4* 35.3   RDW 40.0 40.7 42.0   PLATELETCT 205 190 180   MPV 9.8 10.2 9.9     Recent Labs     02/20/23 2220 02/21/23  0529 02/22/23  0244   SODIUM 134* 137 139   POTASSIUM 3.4* 3.6 3.6   CHLORIDE 98 102 105   CO2 26 25 23   GLUCOSE 148* 162* 150*   BUN 18 18 15   CREATININE 0.63 0.79 0.75   CALCIUM 9.4 9.0 8.6     Recent Labs     02/20/23 2220 02/21/23  0907 02/22/23  0244   APTT 42.3*  --   --    INR 2.13* 2.04* 1.65*         Recent Labs     02/20/23 2220   TRIGLYCERIDE 201*   HDL 35*   *       Imaging  EC-ECHOCARDIOGRAM COMPLETE W/O CONT   Final Result      CL-LEFT HEART CATHETERIZATION WITH POSSIBLE INTERVENTION    (Results Pending)        Assessment/Plan  * NSTEMI (non-ST elevated myocardial infarction) (HCC)- (present on admission)  Assessment & Plan  Typical chest pain  NSTEMI, troponin was highly elevated 390 at outside  - continue hep gtt  - asa, statin continue  - continue home losartan  - hold warfarin  - trending troponins  - monitor on telemetry  cardiology consulted appreciate rec.     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)  Assessment & Plan  With hiatal hernia  On chronic omperazole  continue    Prediabetes- (present on admission)  Assessment & Plan   A1c 6.7  Hold metformin,  Recheck A1c, ISS, accuchecks, hypoglycemia protocol    Hypertension- (present on admission)  Assessment & Plan  Cont on losartan   Monitor and adjust as needed       Hx pulmonary embolism- (present on admission)  Assessment & Plan  On warfarin, as outpatient holding for now   Will be on hep gtt  Recheck INR           VTE prophylaxis: therapeutic anticoagulation with heparin drip     I  have performed a physical exam and reviewed and updated ROS and Plan today (2/22/2023). In review of yesterday's note (2/21/2023), there are no changes except as documented above.

## 2023-02-22 NOTE — CARE PLAN
The patient is Stable - Low risk of patient condition declining or worsening    Shift Goals  Clinical Goals: Monitor CP, Manage heparin gtt  Patient Goals: Comfort, Rest, No CP  Family Goals: ALANIS    Progress made toward(s) clinical / shift goals:    Problem: Knowledge Deficit - Standard  Goal: Patient and family/care givers will demonstrate understanding of plan of care, disease process/condition, diagnostic tests and medications  Outcome: Progressing     Problem: Pain - Standard  Goal: Alleviation of pain or a reduction in pain to the patient’s comfort goal  Outcome: Progressing     Problem: Hemodynamics  Goal: Patient's hemodynamics, fluid balance and neurologic status will be stable or improve  Outcome: Progressing     Problem: Respiratory  Goal: Patient will achieve/maintain optimum respiratory ventilation and gas exchange  Outcome: Progressing       Patient is not progressing towards the following goals:

## 2023-02-22 NOTE — PROGRESS NOTES
MONITOR SUMMARY    Rhythm: SR/SB  Rate: 51-62  Ectopy: r PVC, r PAC  Measurements: .17/.08/.46

## 2023-02-22 NOTE — PROGRESS NOTES
Monitor Report    Sinus Bradycardia to Sinus Rhythm  58 to 67 bpm  Rare PVCs  .14/.09/.43

## 2023-02-22 NOTE — PROCEDURES
Cardiac Catheterization report    2/22/2023  3:05 PM    REFERRING MD: Dr. Mansfield    INDICATION/PREOPERATIVE DIAGNOSIS:  Uncontrolled hypertension  Non-STEMI  History of DVT/PE    POSTOPERATIVE DIAGNOSIS:  Type II demand MI   D1  Unsuccessful  PCI attempt D1    RECOMMENDATIONS:  Guideline directed medical therapy   Cardiovascular Risk factor modification      PROCEDURES PERFORMED:  Coronary arteriograms  Left heart catheterization and Aortic root arteriogram   Supervision moderate sedation  Percutaneous coronary intervention of chronic total occlusion      FINDINGS:  I.  HEMODYNAMICS   Ao: 165/110 mmHg   LEDP: 16 mmHg   Gradient on LV pullback: No    II. CORONARY ANGIOGRAPHY:  Left main coronary artery: Large caliber bifurcating no CAD  Left anterior descending artery: Large caliber bulky but nonobstructive coronary artery disease throughout.  There is a occluded first diagonal.  Post interventional attempt it is apparent that it is chronic totally occluded with ipsilateral collaterals and though was able to be wired the dominant branch was unable to be entered post  therefore further intervention attempts were abandoned.  Left circumflex coronary artery: Large caliber nonobstructive coronary disease  Right coronary artery: Moderate caliber bulky but nonobstructive CAD.    III.  LEFT VENTRICULOGRAM:  LVEF ERICKSON PROJECTION: 55%      Procedure details:  The risks and benefits of cardiac catheterization and possible intervention were explained to the patient including death, heart attack, stroke, and emergency surgery.  The patient was brought to the cardiac catheterization lab where the right wrist was prepped and draped in the usual manner for cardiac catheterization.  The area was anesthetized with lidocaine and a 5/6 FR sheath was inserted into the right radial artery without difficulty. A Hector catheter was advanced to the ostia of the Left coronary artery and arteriograms were recorded.   A Hector  "catheter was advanced to the ostia of the right coronary artery and arteriograms were recorded. Aortic valve was crossed using Hector catheter for left heart catheterization was performed.     Description of PCI:  The decision was made to intervene on the culprit coronary artery.  Anticoagulation was initiated as below.  The diagnostic catheter was exchanged over a wire for an 6 [place] EBU 3.5 guide seated appropriately. A 0.014\" mm  Runthrough was advanced into the artery and use to cross the lesion with the assistance of a balloon for backup and a guide liner.  Wire was able to be advanced however the  balloon was unable to cross the .  Also further evaluation revealed a brisk ipsilateral collateral as well as several branches just after the segment of chronic occlusion.  Over the wire across the  it was unable to cross into the main branch and was only into able to enter into tiny side branches. A the  balloon was inflated multiple attempts within the lesion with no improvement in possibility of large balloon or equipment.  Due to the unclear benefit and increasing risk in this clinical scenario further attempts were abandoned.  Balloon was used to predilate the lesion. All catheters and guidewires were removed and the patient left the cath lab in stable condition.    At the completion of the case the sheath was removed and hemostasis achieved utilizing a radial compression band patient with Cath Lab in stable condition and there were no apparent complications.    Anticoagulant: Angiomax  EBL <25 cc  Complications: none  Specimens: none  Contrast: 195cc    Complications:  None apparent    Sedation time:  Moderate sedation directly monitored by me during the case while supervising the administration of the sedation medication by an independent trained RN to assist in the monitoring of the patient's level of consciousness and physiological status. I, the supervising physician was present the " entire time from beginning of medication administration until the end of the procedure from 1430 until 1650. For detailed administration records please see the moderate sedation documentation in the media tab.      Nadeem Adames MD, FACC, [facility]

## 2023-02-22 NOTE — PROGRESS NOTES
Cardiology Note    Attending Physician: Griffin Mansfield MD    Resident Physician: Edwardo Bliss MD    Interval Update:  Yesterday, pt consented and was scheduled for L heart cath procedure. Overnight, pt was in Sinus Rhythm with HR 51-62, BP ranged from 109-139/52-77, and pt was afebrile.    Pt reports he is feeling well, ready for [facility]. He got up and walked with assistance yesterday and did not feel tired or out of breath.    Past Medical History:   Diagnosis Date    Asthma     COVID-19     Pneumonia        Social History     Socioeconomic History    Marital status: Single     Spouse name: Not on file    Number of children: Not on file    Years of education: Not on file    Highest education level: Not on file   Occupational History    Not on file   Tobacco Use    Smoking status: Never    Smokeless tobacco: Never   Substance and Sexual Activity    Alcohol use: Never    Drug use: Never    Sexual activity: Not on file   Other Topics Concern    Not on file   Social History Narrative    Not on file     Social Determinants of Health     Financial Resource Strain: Not on file   Food Insecurity: Not on file   Transportation Needs: Not on file   Physical Activity: Not on file   Stress: Not on file   Social Connections: Not on file   Intimate Partner Violence: Not on file   Housing Stability: Not on file       No current facility-administered medications on file prior to encounter.     Current Outpatient Medications on File Prior to Encounter   Medication Sig Dispense Refill    metFORMIN (GLUCOPHAGE) 500 MG Tab Take 500 mg by mouth 2 times a day with meals.      losartan (COZAAR) 50 MG Tab Take 100 mg by mouth every day.      hydroCHLOROthiazide (HYDRODIURIL) 50 MG Tab Take 50 mg by mouth every day.      omeprazole (PRILOSEC) 40 MG delayed-release capsule Take 40 mg by mouth every day.      warfarin (COUMADIN) 5 MG Tab Take 1-1.5 Tablets by mouth every day. As directed by [facility] 135 Tablet 1        Current Facility-Administered Medications   Medication Dose Frequency Provider Last Rate Last Admin    heparin infusion 25,000 units in 500 mL 0.45% NACL  0-30 Units/kg/hr (Adjusted) Continuous Beatriz Koo M.D. 32.3 mL/hr at 02/22/23 0717 18 Units/kg/hr at 02/22/23 0717    heparin injection 2,000 Units  2,000 Units PRN Beatriz Koo M.D.   2,000 Units at 02/21/23 1434    NS infusion   Continuous Edwardo Bliss M.D. 75 mL/hr at 02/22/23 0052 New Bag at 02/22/23 0052    rosuvastatin (CRESTOR) tablet 40 mg  40 mg Q EVENING Edwardo Bliss M.D.   40 mg at 02/21/23 1710    senna-docusate (PERICOLACE or SENOKOT S) 8.6-50 MG per tablet 2 Tablet  2 Tablet BID Roman Espinosa M.D.   2 Tablet at 02/22/23 0555    And    polyethylene glycol/lytes (MIRALAX) PACKET 1 Packet  1 Packet QDAY PRN Roman Espinosa M.D.        And    magnesium hydroxide (MILK OF MAGNESIA) suspension 30 mL  30 mL QDAY PRN Roman Espinosa M.D.        And    bisacodyl (DULCOLAX) suppository 10 mg  10 mg QDAY PRN Roman Espinosa M.D.        ondansetron (ZOFRAN ODT) dispertab 4 mg  4 mg Q4HRS PRMATTHEW Espinosa M.D.        promethazine (PHENERGAN) tablet 12.5-25 mg  12.5-25 mg Q4HRS UGO Espinosa M.D.        promethazine (PHENERGAN) suppository 12.5-25 mg  12.5-25 mg Q4HRS UGO Espinosa M.D.        prochlorperazine (COMPAZINE) injection 5-10 mg  5-10 mg Q4HRS PRMATTHEW Espinosa M.D.        insulin regular (HumuLIN R,NovoLIN R) injection  1-6 Units 4X/DAY INGA Espinosa M.D.   1 Units at 02/21/23 2139    And    dextrose 10 % BOLUS 25 g  25 g Q15 MIN PRN Roman Espinosa M.D.        losartan (COZAAR) tablet 100 mg  100 mg DAILY Roman Espinosa M.D.   100 mg at 02/22/23 0555    omeprazole (PRILOSEC) capsule 40 mg  40 mg DAILY Roman Espinosa M.D.   40 mg at 02/21/23 1710   Last reviewed on 2/20/2023  9:27 PM by Piedad King R.N.     Patient has no known allergies.    No family history on  "file.       Physical Exam   Blood pressure 139/77, pulse (!) 58, temperature 36.8 °C (98.2 °F), temperature source Temporal, resp. rate 18, height 1.778 m (5' 10\"), weight 114 kg (251 lb 12.3 oz), SpO2 93 %.    Constitutional: Well appearing, NAD, Laying comfortably in bed.  HENT: Normocephalic and atraumatic. No scleral icterus.   Neck: No JVD present.   Cardiovascular: Normal rate. Exam reveals no gallop and no friction rub. No murmur heard.   Pulmonary/Chest: Clear to anterior auscultation  Musculoskeletal:  Pulses present. No atrophy. Strength normal.  Extremities: Exhibits no edema. No clubbing or cyanosis.   Skin: Skin is warm and dry.   Neuro: Non-focal, CN 2-12 intact grossly    No intake or output data in the 24 hours ending 02/22/23 0733    Recent Labs     02/20/23 2220 02/21/23  0529 02/22/23  0244   WBC 7.3 6.0 6.1   RBC 4.78 4.77 4.49*   HEMOGLOBIN 15.7 15.5 14.7   HEMATOCRIT 43.6 43.8 41.7*   MCV 91.2 91.8 92.9   MCH 32.8 32.5 32.7   MCHC 36.0* 35.4* 35.3   RDW 40.0 40.7 42.0   PLATELETCT 205 190 180   MPV 9.8 10.2 9.9     Recent Labs     02/20/23 2220 02/21/23  0529 02/22/23  0244   SODIUM 134* 137 139   POTASSIUM 3.4* 3.6 3.6   CHLORIDE 98 102 105   CO2 26 25 23   GLUCOSE 148* 162* 150*   BUN 18 18 15   CREATININE 0.63 0.79 0.75   CALCIUM 9.4 9.0 8.6     Recent Labs     02/20/23 2220 02/21/23  0907 02/22/23  0244   APTT 42.3*  --   --    INR 2.13* 2.04* 1.65*             Recent Labs     02/20/23 2220   TRIGLYCERIDE 201*   HDL 35*   *       Impressions:  #NSTEMI  #HTN  #Hx of Unprovoked DVT on 4/2022  #Anticoagulated on warfarin outpatient  #Prediabetes    Recommendations:  - Heparin Drip  - Crestor 40 daily  - Continue Losartan  - Holding beta blockers due to HR  - Consider SGLT2 outpatient  - [facility]        Edwardo Bliss MD, PGY-2  [facility] Resident    "

## 2023-02-22 NOTE — PROGRESS NOTES
First therapeutic heparin Xa 0.45 at 2030. Verified with second RN. Next Xa scheduled for 0230.     Second therapeutic heparin Xa 0.50 at 0230. Next Xa in 24 hours at 0230 on 2/23/2023.

## 2023-02-23 LAB
ANION GAP SERPL CALC-SCNC: 9 MMOL/L (ref 7–16)
BUN SERPL-MCNC: 14 MG/DL (ref 8–22)
CALCIUM SERPL-MCNC: 8.5 MG/DL (ref 8.5–10.5)
CHLORIDE SERPL-SCNC: 105 MMOL/L (ref 96–112)
CO2 SERPL-SCNC: 24 MMOL/L (ref 20–33)
CREAT SERPL-MCNC: 0.79 MG/DL (ref 0.5–1.4)
EKG IMPRESSION: NORMAL
ERYTHROCYTE [DISTWIDTH] IN BLOOD BY AUTOMATED COUNT: 40.3 FL (ref 35.9–50)
GFR SERPLBLD CREATININE-BSD FMLA CKD-EPI: 99 ML/MIN/1.73 M 2
GLUCOSE BLD STRIP.AUTO-MCNC: 122 MG/DL (ref 65–99)
GLUCOSE BLD STRIP.AUTO-MCNC: 133 MG/DL (ref 65–99)
GLUCOSE BLD STRIP.AUTO-MCNC: 133 MG/DL (ref 65–99)
GLUCOSE BLD STRIP.AUTO-MCNC: 138 MG/DL (ref 65–99)
GLUCOSE SERPL-MCNC: 129 MG/DL (ref 65–99)
HCT VFR BLD AUTO: 41.5 % (ref 42–52)
HGB BLD-MCNC: 14.7 G/DL (ref 14–18)
INR PPP: 1.39 (ref 0.87–1.13)
MCH RBC QN AUTO: 32.7 PG (ref 27–33)
MCHC RBC AUTO-ENTMCNC: 35.4 G/DL (ref 33.7–35.3)
MCV RBC AUTO: 92.2 FL (ref 81.4–97.8)
PLATELET # BLD AUTO: 168 K/UL (ref 164–446)
PMV BLD AUTO: 9.9 FL (ref 9–12.9)
POTASSIUM SERPL-SCNC: 3.4 MMOL/L (ref 3.6–5.5)
PROTHROMBIN TIME: 16.8 SEC (ref 12–14.6)
RBC # BLD AUTO: 4.5 M/UL (ref 4.7–6.1)
SODIUM SERPL-SCNC: 138 MMOL/L (ref 135–145)
WBC # BLD AUTO: 5.9 K/UL (ref 4.8–10.8)

## 2023-02-23 PROCEDURE — 700102 HCHG RX REV CODE 250 W/ 637 OVERRIDE(OP): Performed by: INTERNAL MEDICINE

## 2023-02-23 PROCEDURE — A9270 NON-COVERED ITEM OR SERVICE: HCPCS | Performed by: INTERNAL MEDICINE

## 2023-02-23 PROCEDURE — 85610 PROTHROMBIN TIME: CPT

## 2023-02-23 PROCEDURE — A9270 NON-COVERED ITEM OR SERVICE: HCPCS

## 2023-02-23 PROCEDURE — 85027 COMPLETE CBC AUTOMATED: CPT

## 2023-02-23 PROCEDURE — 700102 HCHG RX REV CODE 250 W/ 637 OVERRIDE(OP)

## 2023-02-23 PROCEDURE — 99232 SBSQ HOSP IP/OBS MODERATE 35: CPT | Mod: FS | Performed by: INTERNAL MEDICINE

## 2023-02-23 PROCEDURE — 93010 ELECTROCARDIOGRAM REPORT: CPT | Performed by: INTERNAL MEDICINE

## 2023-02-23 PROCEDURE — 99232 SBSQ HOSP IP/OBS MODERATE 35: CPT | Performed by: INTERNAL MEDICINE

## 2023-02-23 PROCEDURE — 700111 HCHG RX REV CODE 636 W/ 250 OVERRIDE (IP): Performed by: INTERNAL MEDICINE

## 2023-02-23 PROCEDURE — 770020 HCHG ROOM/CARE - TELE (206)

## 2023-02-23 PROCEDURE — 82962 GLUCOSE BLOOD TEST: CPT | Mod: 91

## 2023-02-23 PROCEDURE — 36415 COLL VENOUS BLD VENIPUNCTURE: CPT

## 2023-02-23 PROCEDURE — 80048 BASIC METABOLIC PNL TOTAL CA: CPT

## 2023-02-23 RX ORDER — WARFARIN SODIUM 5 MG/1
5 TABLET ORAL DAILY
Status: DISCONTINUED | OUTPATIENT
Start: 2023-02-23 | End: 2023-02-24

## 2023-02-23 RX ORDER — ENOXAPARIN SODIUM 150 MG/ML
120 INJECTION SUBCUTANEOUS EVERY 12 HOURS
Status: DISCONTINUED | OUTPATIENT
Start: 2023-02-23 | End: 2023-02-25 | Stop reason: HOSPADM

## 2023-02-23 RX ADMIN — ROSUVASTATIN CALCIUM 40 MG: 20 TABLET, FILM COATED ORAL at 17:09

## 2023-02-23 RX ADMIN — OMEPRAZOLE 40 MG: 20 CAPSULE, DELAYED RELEASE ORAL at 17:10

## 2023-02-23 RX ADMIN — ENOXAPARIN SODIUM 120 MG: 120 INJECTION SUBCUTANEOUS at 11:12

## 2023-02-23 RX ADMIN — PROMETHAZINE HYDROCHLORIDE 25 MG: 25 TABLET ORAL at 19:29

## 2023-02-23 RX ADMIN — ENOXAPARIN SODIUM 120 MG: 120 INJECTION SUBCUTANEOUS at 21:38

## 2023-02-23 RX ADMIN — WARFARIN SODIUM 5 MG: 5 TABLET ORAL at 17:10

## 2023-02-23 RX ADMIN — SENNOSIDES AND DOCUSATE SODIUM 2 TABLET: 50; 8.6 TABLET ORAL at 05:29

## 2023-02-23 ASSESSMENT — ENCOUNTER SYMPTOMS
VOMITING: 0
SHORTNESS OF BREATH: 0
NAUSEA: 0
CHILLS: 0
EYE PAIN: 0
BACK PAIN: 0
DIZZINESS: 0
BLURRED VISION: 0
HEADACHES: 0
PALPITATIONS: 0
NERVOUS/ANXIOUS: 0
ABDOMINAL PAIN: 0
INSOMNIA: 0
COUGH: 0
FEVER: 0

## 2023-02-23 ASSESSMENT — FIBROSIS 4 INDEX: FIB4 SCORE: 1.71

## 2023-02-23 ASSESSMENT — PAIN DESCRIPTION - PAIN TYPE: TYPE: ACUTE PAIN

## 2023-02-23 NOTE — PROGRESS NOTES
Report received from Brandie WORKMAN, assumed care of patient. Pt A&O x 4, resting in bed, independent in room. Gauze/tegaderm to R wrist, TR band removed prior to shift, no signs of bleeding. Call light and belongings within reach, bed in lowest position. All questions and concerns addressed. Pt waiting for cards to round, informed will be either tonight or tomorrow morning. No signs of distress at this time.

## 2023-02-23 NOTE — CARE PLAN
Problem: Knowledge Deficit - Standard  Goal: Patient and family/care givers will demonstrate understanding of plan of care, disease process/condition, diagnostic tests and medications  Outcome: Progressing     Problem: Pain - Standard  Goal: Alleviation of pain or a reduction in pain to the patient’s comfort goal  Outcome: Progressing     Problem: Hemodynamics  Goal: Patient's hemodynamics, fluid balance and neurologic status will be stable or improve  Outcome: Progressing     Problem: Respiratory  Goal: Patient will achieve/maintain optimum respiratory ventilation and gas exchange  Outcome: Progressing   The patient is Stable - Low risk of patient condition declining or worsening    Shift Goals  Clinical Goals: Monitor VS, Monitor for bleeding, Pain control  Patient Goals: possible d/c  Family Goals: rest    Progress made toward(s) clinical / shift goals:      Patient is not progressing towards the following goals:

## 2023-02-23 NOTE — PROGRESS NOTES
2030: Pt reports having chest pain. Assessed pt, VSS, no acute changes, no apparent distress. Pt reports that the pain is localized to his left chest, no radiating. Describes pain as shapr, but inconsistent, no pattern. Pain comes in short spurts, only lasts 1-2 seconds. Hospitalist notified, orders for PRN Tylenol and Morphine, as well as one time GI cocktail, and STAT EKG. Pt reports pain is tolerable, and does not believe he needs morphine. Agreeable to take PRN Tylenol and one time dose of GI cocktail. EKG completed, per hospitalist it looks good. Pt educated on importance of notifying us of any changes, including if chest pain changes or worsens. Pt agreeable. Resting comfortably in bed.

## 2023-02-23 NOTE — PROGRESS NOTES
[facility] Cardiology Follow-up Note    Date of Service:    2/23/2023      Name:   Jayce Espino   YOB: 1960  Age:   62 y.o.  male   MRN:   [id number]    Reason for cardiology consult: NSTEMI    Attending Provider: Dr Koo    HPI:  Mr Espino is a 62m with PMHx of HTN, hx of PE in April 2022 (on warfarin), prediabetes, and barretts esophagus, who presented to [facility] on 2/19/23 with substernal chest pain and tightness. He was transferred here  for cardiology consult, underwent LHC which showed  to D1 with an unsuccessful attempt to open.     Interim Events:  2/23/23  - Personal Telemetry interpretation: SB [id number]  - Overnight events: denies chest pain, up ambulating hallways  - Vitals: /59  - Labs reviewed: K+ 3.4    ROS  Constitutional: denies fatigue.  Respiratory:  Denies shortness of breath, no cough.  Cardiovascular: mild intermittent chest pain. denies lower extremity edema.  Denies orthopnea or PND.  : denies polyuria, no dysuria.  GI:  Denies nausea/vomiting.  No abdominal distention.  Neuro:  Denies dizziness, syncope.  Hem/lymph: Denies easy bleeding/bruising.      All other review of systems reviewed and negative.    Past medical, surgical, social, and family history reviewed and unchanged from admission except as noted in HPI.    Medications: Reviewed in MAR  Current Facility-Administered Medications   Medication Dose Frequency Provider Last Rate Last Admin    NS infusion   Continuous Nadeem Adames M.D.   Stopped at 02/22/23 1545    acetaminophen (Tylenol) tablet 650 mg  650 mg Q6HRS PRN Alaina Fortune, A.P.R.N.   650 mg at 02/22/23 2056    morphine 4 MG/ML injection 2 mg  2 mg Q4HRS PRN Alaina Fortune, A.P.R.N.        rosuvastatin (CRESTOR) tablet 40 mg  40 mg Q EVENING Edwardo Bliss M.D.   40 mg at 02/22/23 1719    senna-docusate (PERICOLACE or SENOKOT S) 8.6-50 MG per tablet 2 Tablet  2 Tablet BID Roman Espinosa M.D.   2  "Tablet at 02/23/23 0529    And    polyethylene glycol/lytes (MIRALAX) PACKET 1 Packet  1 Packet QDAY PRN Roman Espinosa M.D.        And    magnesium hydroxide (MILK OF MAGNESIA) suspension 30 mL  30 mL QDAY PRN Roman Espinosa M.D.        And    bisacodyl (DULCOLAX) suppository 10 mg  10 mg QDAY PRN Roman Espinosa M.D.        ondansetron (ZOFRAN ODT) dispertab 4 mg  4 mg Q4HRS PRN Roman Espinosa M.D.        promethazine (PHENERGAN) tablet 12.5-25 mg  12.5-25 mg Q4HRS PRN Roman Espinosa M.D.        promethazine (PHENERGAN) suppository 12.5-25 mg  12.5-25 mg Q4HRS PRN Roman Espinosa M.D.        prochlorperazine (COMPAZINE) injection 5-10 mg  5-10 mg Q4HRS PRN Roman Espinosa M.D.        insulin regular (HumuLIN R,NovoLIN R) injection  1-6 Units 4X/DAY INGA Espinosa M.D.   1 Units at 02/21/23 2139    And    dextrose 10 % BOLUS 25 g  25 g Q15 MIN PRN Roman Espinosa M.D.        losartan (COZAAR) tablet 100 mg  100 mg DAILY Roman Espinosa M.D.   100 mg at 02/22/23 0555    omeprazole (PRILOSEC) capsule 40 mg  40 mg DAILY Roman Espinosa M.D.   40 mg at 02/22/23 1719   Last reviewed on 2/20/2023  9:27 PM by Piedad King R.N.   No Known Allergies    Physical Exam  Body mass index is 36.12 kg/m². /59   Pulse (!) 53   Temp 37 °C (98.6 °F) (Temporal)   Resp 16   Ht 1.778 m (5' 10\")   Wt 114 kg (251 lb 12.3 oz)   SpO2 93%    Vitals:    02/22/23 1915 02/22/23 2305 02/23/23 0319 02/23/23 0703   BP: 118/58 114/62 97/52 109/59   Pulse: 61 (!) 55 (!) 51 (!) 53   Resp: 18 16 16 16   Temp: 37 °C (98.6 °F) 36.2 °C (97.2 °F) 36.9 °C (98.4 °F) 37 °C (98.6 °F)   TempSrc: Temporal Temporal Temporal Temporal   SpO2: 92% 94% 94% 93%   Weight:       Height:        Oxygen Therapy:  Pulse Oximetry: 93 %, O2 (LPM): 0, O2 Delivery Device: None - Room Air    General: no acute distress, well- appearing  Neck: No JVD, no bruits  Lungs: CTAB, normal effort. no wheezing, rales, or " rhonchi  Heart: RRR, normal S1 /S2, no murmur, no rub  EXT: No lower extremity edema, 2+ radial pulses. 2+ pedal pulses.   Abdomen: soft, non tender, non distended  Neurological: No focal deficits, no facial asymmetry.  Normal speech  Psychiatric: Appropriate affect, alert and oriented x 3  Skin: Warm and dry extremities, no rashes    Labs (personally reviewed):     Lab Results   Component Value Date/Time    SODIUM 138 02/23/2023 03:13 AM    POTASSIUM 3.4 (L) 02/23/2023 03:13 AM    CHLORIDE 105 02/23/2023 03:13 AM    CO2 24 02/23/2023 03:13 AM    GLUCOSE 129 (H) 02/23/2023 03:13 AM    BUN 14 02/23/2023 03:13 AM    CREATININE 0.79 02/23/2023 03:13 AM     Lab Results   Component Value Date/Time    ALKPHOSPHAT 74 04/08/2021 09:13 PM    ASTSGOT 25 04/08/2021 09:13 PM    ALTSGPT 31 04/08/2021 09:13 PM    TBILIRUBIN 0.8 04/08/2021 09:13 PM      Lab Results   Component Value Date/Time    CHOLSTRLTOT 192 02/20/2023 10:20 PM     (H) 02/20/2023 10:20 PM    HDL 35 (A) 02/20/2023 10:20 PM    TRIGLYCERIDE 201 (H) 02/20/2023 10:20 PM      Latest Reference Range & Units 02/20/23 22:20   Troponin T 6 - 19 ng/L 70 (H)   (H): Data is abnormally high\      Cardiac Imaging and Procedures Review:      EKG 2/22/23: My Personal interpretation reveals SB 53    Echo 2/21/23:  No prior study is available for comparison.   The ascending aorta is dilated with a diameter of 4.2 cm.  Normal LV size, thickness and systolic function, estimated LVEF 55%  Normal RV size and systolic function  No significant valvular abnormalities except for aortic sclerosis  No pericardial effusion  Dilated IVC with inspiratory collapse  Unable to estimate RVSP    C 2/22/23:  INDICATION/PREOPERATIVE DIAGNOSIS:  Uncontrolled hypertension  Non-STEMI  History of DVT/PE     POSTOPERATIVE DIAGNOSIS:  Type II demand MI   D1  Unsuccessful  PCI attempt D1     RECOMMENDATIONS:  Guideline directed medical therapy   Cardiovascular Risk factor  modification    Assessment and Medical Decision Making:    NSTEMI  -Type 2 MI  -Continue rosuvastatin 40 mg  -For DM2 and CAD recommend SGLT2i, Jardiance, for synergistic diuretic effect, diabetes control and attenuation of myocardial oxidative stress and fibrosis   -No antiplatelet given patient is on chronic warfarin    HTN  -Continue home losartan, held this morning due to low BP consider reducing dose to 50 mg daily    Thank you for allowing me to participate in this patients care.  Please contact me with any questions or concerns.    Please see Dr. Mansfield's attestation for additions and further recommendations.'    I personally spent a total of 15 minutes which includes face-to-face time and non-face-to-face time spent on preparing to see the patient, reviewing hospital notes and tests, obtaining history from the patient, performing a medically appropriate exam, counseling and educating the patient, ordering medications/tests/procedures/referrals as clinically indicated, and documenting information in the electronic medical record.      PLEASE NOTE: Some of this dictation was created using voice recognition software. I have made every reasonable attempt to correct obvious errors, but I expect that there are errors of grammar and possibly content that I did not discover before finalizing the note.     AKIKO Aden.   [facility]  [phone]

## 2023-02-23 NOTE — PROGRESS NOTES
Inpatient Anticoagulation Service Note for 2/23/2023      Reason for Anticoagulation: Deep Vein Thrombosis, Pulmonary Embolism   Hemoglobin Value: 14.7  Hematocrit Value: (!) 41.5  Lab Platelet Value: 168  Target INR: 2.0 to 3.0    INR from last 7 days       Date/Time INR Value    02/23/23 0313 1.39    02/22/23 0244 1.65    02/21/23 0907 2.04    02/20/23 2220 2.13          Dose from last 7 days       Date/Time Dose (mg)    02/23/23 0945 5          Significant Interactions: Not Applicable  Bridge Therapy: Yes  Bridge Therapy Start Date: 02/23/23  Days of Overlap Therapy: 1   INR Value Greater than 2 Prior to Discontinuation of Parenteral Anticoagulation: Not Applicable   Reversal Agent Administered: Not Applicable  Comments: Continue with 5 mg daily and transition back to home dosing  Education Material Provided?: No    Pharmacist suggested discharge dosing: warfarin home dosing and INR check within 48 hours of discharge.     Yvrose Lugo, JessicaD  [id number]

## 2023-02-23 NOTE — PROGRESS NOTES
Rn took out 2 cc of air from tr band. Site started bleeding. Rn administered 2 cc back into band. Brachial PULSE  +2, CAP REFILL IS <3 SECONDS, pt as no complaints of numbness or tingling.

## 2023-02-23 NOTE — PROGRESS NOTES
Hospital Medicine Daily Progress Note    Date of Service  2/23/2023    Chief Complaint  Jayce Espino is a 62 y.o. male admitted 2/20/2023 with chest pain     Hospital Course  This is a 62 y.o. male PMH HTN, hx of PE, now on warafarin failed DOAC, Moody's, hiatal hernia who presented 2/20/2023 with chest pain to outside facility where he was found also to have an elevated troponin he was given aspirin and transferred  here for cardiology evaluation     Interval Problem Update  2/21: Patient seen and examined , afebrile,  cont on heparin drip for possible NSTEMI. I have consulted cardiology today appreciate rec.  Close monitoring on tele   2/22; Patient resting in bed, denies any chest pain this AM, cardiology following and plan is for cardiaca cath today. Appreciate rec.   Cont on heparin drip   Monitor on tele   2/23: patient seen and examined had cardiac cath done yesterday  which showed  to D1 with an unsuccessful attempt to open.   Appreciate cardiology rec.   Will start him back on warfarin and bridging with Lovenox for his h/o PE and DVT   I have discussed this patient's plan of care and discharge plan at IDT rounds today with Case Management, Nursing, Nursing leadership, and other members of the IDT team.    Consultants/Specialty  cardiology    Code Status  Full Code    Disposition  Patient is not medically cleared for discharge.   Anticipate discharge to to home with close outpatient follow-up.  I have placed the appropriate orders for post-discharge needs.    Review of Systems  Review of Systems   Constitutional:  Negative for chills and fever.   HENT:  Negative for congestion and hearing loss.    Eyes:  Negative for blurred vision and pain.   Respiratory:  Negative for cough and shortness of breath.    Cardiovascular:  Negative for chest pain and palpitations.   Gastrointestinal:  Negative for abdominal pain, nausea and vomiting.   Genitourinary:  Negative for dysuria and hematuria.    Musculoskeletal:  Negative for back pain and joint pain.   Skin:  Negative for itching and rash.   Neurological:  Negative for dizziness and headaches.   Psychiatric/Behavioral:  The patient is not nervous/anxious and does not have insomnia.    All other systems reviewed and are negative.     Physical Exam  Temp:  [36.2 °C (97.2 °F)-37.3 °C (99.1 °F)] 37.3 °C (99.1 °F)  Pulse:  [51-61] 53  Resp:  [16-18] 16  BP: ()/(52-79) 111/74  SpO2:  [92 %-94 %] 93 %    Physical Exam  Vitals and nursing note reviewed.   Constitutional:       General: He is not in acute distress.     Appearance: He is obese. He is not diaphoretic.   HENT:      Head: Normocephalic and atraumatic.      Right Ear: External ear normal.      Left Ear: External ear normal.      Nose: Nose normal. No congestion.   Eyes:      General: No scleral icterus.     Extraocular Movements: Extraocular movements intact.   Cardiovascular:      Rate and Rhythm: Normal rate and regular rhythm.      Pulses: Normal pulses.      Heart sounds: Normal heart sounds. No murmur heard.     Comments: Difficult to auscultate due to body habitus, distant heart sounds  Pulmonary:      Effort: Pulmonary effort is normal. No respiratory distress.      Breath sounds: Normal breath sounds. No wheezing or rales.      Comments: Difficult to auscultate due to body habitus  Abdominal:      General: Abdomen is flat. Bowel sounds are normal. There is no distension.      Palpations: Abdomen is soft.      Tenderness: There is no abdominal tenderness. There is no guarding or rebound.   Musculoskeletal:         General: No swelling or tenderness. Normal range of motion.      Cervical back: Normal range of motion and neck supple.   Skin:     General: Skin is warm.      Capillary Refill: Capillary refill takes less than 2 seconds.      Coloration: Skin is not jaundiced or pale.      Findings: No erythema.   Neurological:      General: No focal deficit present.      Mental Status: He is  alert and oriented to person, place, and time. Mental status is at baseline.      Motor: No weakness.   Psychiatric:         Mood and Affect: Mood normal.         Behavior: Behavior normal.         Thought Content: Thought content normal.         Judgment: Judgment normal.       Fluids    Intake/Output Summary (Last 24 hours) at 2/23/2023 1248  Last data filed at 2/22/2023 1500  Gross per 24 hour   Intake 1000 ml   Output --   Net 1000 ml         Laboratory  Recent Labs     02/21/23  0529 02/22/23  0244 02/23/23  0313   WBC 6.0 6.1 5.9   RBC 4.77 4.49* 4.50*   HEMOGLOBIN 15.5 14.7 14.7   HEMATOCRIT 43.8 41.7* 41.5*   MCV 91.8 92.9 92.2   MCH 32.5 32.7 32.7   MCHC 35.4* 35.3 35.4*   RDW 40.7 42.0 40.3   PLATELETCT 190 180 168   MPV 10.2 9.9 9.9     Recent Labs     02/21/23  0529 02/22/23  0244 02/23/23  0313   SODIUM 137 139 138   POTASSIUM 3.6 3.6 3.4*   CHLORIDE 102 105 105   CO2 25 23 24   GLUCOSE 162* 150* 129*   BUN 18 15 14   CREATININE 0.79 0.75 0.79   CALCIUM 9.0 8.6 8.5     Recent Labs     02/20/23  2220 02/21/23  0907 02/22/23  0244 02/23/23 0313   APTT 42.3*  --   --   --    INR 2.13* 2.04* 1.65* 1.39*         Recent Labs     02/20/23 2220   TRIGLYCERIDE 201*   HDL 35*   *       Imaging  EC-ECHOCARDIOGRAM COMPLETE W/O CONT   Final Result      CL-LEFT HEART CATHETERIZATION WITH POSSIBLE INTERVENTION    (Results Pending)        Assessment/Plan  * NSTEMI (non-ST elevated myocardial infarction) (HCC)- (present on admission)  Assessment & Plan  Typical chest pain  NSTEMI, troponin was highly elevated 390 at outside  - asa, statin continue  - continue home losartan  - hold warfarin  - trending troponins  -rodolfo cardiac cath showing  which showed  to D1 with an unsuccessful attempt to open.   Appreciate cardiology rec     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)  Assessment & Plan  With hiatal hernia  On chronic omperazole  continue    Prediabetes- (present on admission)  Assessment & Plan   A1c  6.7  Hold metformin,  Recheck A1c, ISS, accuchecks, hypoglycemia protocol    Hypertension- (present on admission)  Assessment & Plan  Cont on losartan   Monitor and adjust as needed       Hx pulmonary embolism- (present on admission)  Assessment & Plan  On warfarin, was initially held   Will restart it and bridge with lovenox            VTE prophylaxis: therapeutic anticoagulation with heparin drip     I have performed a physical exam and reviewed and updated ROS and Plan today (2/23/2023). In review of yesterday's note (2/22/2023), there are no changes except as documented above.

## 2023-02-23 NOTE — CARE PLAN
The patient is Stable - Low risk of patient condition declining or worsening    Shift Goals  Clinical Goals: Monitor VS, Monitor for bleeding, Pain control  Patient Goals: Comfort, Rest  Family Goals: ALANIS    Progress made toward(s) clinical / shift goals:    Problem: Knowledge Deficit - Standard  Goal: Patient and family/care givers will demonstrate understanding of plan of care, disease process/condition, diagnostic tests and medications  Outcome: Progressing     Problem: Pain - Standard  Goal: Alleviation of pain or a reduction in pain to the patient’s comfort goal  Outcome: Progressing     Problem: Hemodynamics  Goal: Patient's hemodynamics, fluid balance and neurologic status will be stable or improve  Outcome: Progressing     Problem: Respiratory  Goal: Patient will achieve/maintain optimum respiratory ventilation and gas exchange  Outcome: Progressing       Patient is not progressing towards the following goals:

## 2023-02-23 NOTE — PROGRESS NOTES
Patient resting quietly in bed, no S/S of distress, A&Ox4. Denies SOB, chest pain, dizziness. Bed alarm on, call light within reach,  pt calls appropriately and does not get out of bed. Bed in lowest position, bed locked, RN and CNA numbers provided, no further needs at this time. No changes from EPIC. Hourly rounding in place.

## 2023-02-24 PROBLEM — E87.6 HYPOKALEMIA: Status: ACTIVE | Noted: 2023-02-24

## 2023-02-24 LAB
GLUCOSE BLD STRIP.AUTO-MCNC: 110 MG/DL (ref 65–99)
GLUCOSE BLD STRIP.AUTO-MCNC: 127 MG/DL (ref 65–99)
GLUCOSE BLD STRIP.AUTO-MCNC: 135 MG/DL (ref 65–99)
GLUCOSE BLD STRIP.AUTO-MCNC: 146 MG/DL (ref 65–99)
INR PPP: 1.3 (ref 0.87–1.13)
PROTHROMBIN TIME: 15.9 SEC (ref 12–14.6)

## 2023-02-24 PROCEDURE — 36415 COLL VENOUS BLD VENIPUNCTURE: CPT

## 2023-02-24 PROCEDURE — 85610 PROTHROMBIN TIME: CPT

## 2023-02-24 PROCEDURE — 700102 HCHG RX REV CODE 250 W/ 637 OVERRIDE(OP)

## 2023-02-24 PROCEDURE — A9270 NON-COVERED ITEM OR SERVICE: HCPCS | Performed by: INTERNAL MEDICINE

## 2023-02-24 PROCEDURE — 700102 HCHG RX REV CODE 250 W/ 637 OVERRIDE(OP): Performed by: INTERNAL MEDICINE

## 2023-02-24 PROCEDURE — 770020 HCHG ROOM/CARE - TELE (206)

## 2023-02-24 PROCEDURE — 700111 HCHG RX REV CODE 636 W/ 250 OVERRIDE (IP): Performed by: INTERNAL MEDICINE

## 2023-02-24 PROCEDURE — 82962 GLUCOSE BLOOD TEST: CPT

## 2023-02-24 PROCEDURE — 99232 SBSQ HOSP IP/OBS MODERATE 35: CPT | Performed by: INTERNAL MEDICINE

## 2023-02-24 PROCEDURE — A9270 NON-COVERED ITEM OR SERVICE: HCPCS

## 2023-02-24 RX ORDER — WARFARIN SODIUM 7.5 MG/1
7.5 TABLET ORAL
Status: COMPLETED | OUTPATIENT
Start: 2023-02-24 | End: 2023-02-24

## 2023-02-24 RX ORDER — POTASSIUM CHLORIDE 20 MEQ/1
40 TABLET, EXTENDED RELEASE ORAL ONCE
Status: COMPLETED | OUTPATIENT
Start: 2023-02-24 | End: 2023-02-24

## 2023-02-24 RX ADMIN — ENOXAPARIN SODIUM 120 MG: 120 INJECTION SUBCUTANEOUS at 21:00

## 2023-02-24 RX ADMIN — POTASSIUM CHLORIDE 40 MEQ: 1500 TABLET, EXTENDED RELEASE ORAL at 12:18

## 2023-02-24 RX ADMIN — SENNOSIDES AND DOCUSATE SODIUM 2 TABLET: 50; 8.6 TABLET ORAL at 17:32

## 2023-02-24 RX ADMIN — OMEPRAZOLE 40 MG: 20 CAPSULE, DELAYED RELEASE ORAL at 17:32

## 2023-02-24 RX ADMIN — ENOXAPARIN SODIUM 120 MG: 120 INJECTION SUBCUTANEOUS at 09:49

## 2023-02-24 RX ADMIN — LOSARTAN POTASSIUM 100 MG: 50 TABLET, FILM COATED ORAL at 05:42

## 2023-02-24 RX ADMIN — ROSUVASTATIN CALCIUM 40 MG: 20 TABLET, FILM COATED ORAL at 17:32

## 2023-02-24 RX ADMIN — WARFARIN SODIUM 7.5 MG: 7.5 TABLET ORAL at 17:32

## 2023-02-24 ASSESSMENT — ENCOUNTER SYMPTOMS
NERVOUS/ANXIOUS: 0
HEADACHES: 0
BLURRED VISION: 0
BACK PAIN: 0
NAUSEA: 0
SHORTNESS OF BREATH: 0
EYE PAIN: 0
INSOMNIA: 0
VOMITING: 0
DIZZINESS: 0
CHILLS: 0
ABDOMINAL PAIN: 0
FEVER: 0
COUGH: 0
PALPITATIONS: 0

## 2023-02-24 ASSESSMENT — FIBROSIS 4 INDEX: FIB4 SCORE: 1.71

## 2023-02-24 ASSESSMENT — PATIENT HEALTH QUESTIONNAIRE - PHQ9
2. FEELING DOWN, DEPRESSED, IRRITABLE, OR HOPELESS: NOT AT ALL
1. LITTLE INTEREST OR PLEASURE IN DOING THINGS: NOT AT ALL
SUM OF ALL RESPONSES TO PHQ9 QUESTIONS 1 AND 2: 0

## 2023-02-24 NOTE — CARE PLAN
The patient is Stable - Low risk of patient condition declining or worsening    Shift Goals  Clinical Goals: stable v/s,monitor chest pain  Patient Goals: go home  Family Goals: rest    Progress made toward(s) clinical / shift goals:      Problem: Knowledge Deficit - Standard  Goal: Patient and family/care givers will demonstrate understanding of plan of care, disease process/condition, diagnostic tests and medications  Outcome: Progressing     Problem: Pain - Standard  Goal: Alleviation of pain or a reduction in pain to the patient’s comfort goal  Outcome: Progressing     Problem: Hemodynamics  Goal: Patient's hemodynamics, fluid balance and neurologic status will be stable or improve  Outcome: Progressing     Problem: Respiratory  Goal: Patient will achieve/maintain optimum respiratory ventilation and gas exchange  Outcome: Progressing       Patient is not progressing towards the following goals:

## 2023-02-24 NOTE — CARE PLAN
The patient is Stable - Low risk of patient condition declining or worsening    Shift Goals  Clinical Goals: Monitor INR  Patient Goals: Monitor INR, go home  Family Goals: ALANIS    Progress made toward(s) clinical / shift goals:      Problem: Knowledge Deficit - Standard  Goal: Patient and family/care givers will demonstrate understanding of plan of care, disease process/condition, diagnostic tests and medications  Outcome: Progressing     Problem: Hemodynamics  Goal: Patient's hemodynamics, fluid balance and neurologic status will be stable or improve  Outcome: Progressing     Problem: Respiratory  Goal: Patient will achieve/maintain optimum respiratory ventilation and gas exchange  Outcome: Progressing       Patient is not progressing towards the following goals:

## 2023-02-24 NOTE — PROGRESS NOTES
Assessed, alert and awake, just finished with dinner and c/o epigastric discomfort, prn med given (see MAR), denied chest pain at this time, will continue to monitor.

## 2023-02-24 NOTE — PROGRESS NOTES
Inpatient Anticoagulation Service Note for 2/24/2023      Reason for Anticoagulation: Deep Vein Thrombosis, Pulmonary Embolism           Hemoglobin Value: 14.7  Hematocrit Value: (!) 41.5  Lab Platelet Value: 168  Target INR: 2.0 to 3.0    INR from last 7 days       Date/Time INR Value    02/24/23 0204 1.3    02/23/23 0313 1.39    02/22/23 0244 1.65    02/21/23 0907 2.04    02/20/23 2220 2.13          Dose from last 7 days       Date/Time Dose (mg)    02/24/23 0905 7.5    02/23/23 0945 5          Average Dose (mg):  (Home dose: 7.5 mg M/F & 5 mg AOD)  Significant Interactions: Not Applicable  Bridge Therapy: Yes  Date of Last VTE Event: 06/10/22  Bridge Therapy Start Date: 02/23/23  Days of Overlap Therapy: 1   Reversal Agent Administered: Not Applicable    Comments: INR subtherapeutic. Since warfarin was resumed yesterday, expect to see the effect on INR tomorrow morning. Pt's home dose is 7.5 mg M/F and 5 mg all other days, has been in TTR ~64% since starting warfarin in June 2022. No s/sx of bleeding per chart review. Renal & hepatic function are stable. Will increase the dose tonight. Continue bridging with lovenox.    Plan:  7.5 mg   Education Material Provided?: No (established warfarin patient.)    Pharmacist suggested discharge dosing: Warfarin 7.5 mg M/F and 5 mg AOD with INR follow up within 48-72 hours of discharge.      Ngozi Ordonez, PharmD

## 2023-02-24 NOTE — PROGRESS NOTES
.Received bedside report from RN, pt care assumed, VSS, pt assessment complete. Pt AAOx4, with no pain at this time. No signs of acute distress noted at this time. Plan of care discussed with pt and verbalizes no questions. Pt denies any additional needs at this time. Bed locked/in lowest position, bed alarm on, pt educated on fall risk and verbalized understanding, call light within reach, hourly rounding initiated.

## 2023-02-24 NOTE — PROGRESS NOTES
Hospital Medicine Daily Progress Note    Date of Service  2/24/2023    Chief Complaint  Jayce Espino is a 62 y.o. male admitted 2/20/2023 with chest pain     Hospital Course  This is a 62 y.o. male PMH HTN, hx of PE, now on warafarin failed DOAC, Moody's, hiatal hernia who presented 2/20/2023 with chest pain to outside facility where he was found also to have an elevated troponin he was given aspirin and transferred  here for cardiology evaluation     Interval Problem Update  2/21: Patient seen and examined , afebrile,  cont on heparin drip for possible NSTEMI. I have consulted cardiology today appreciate rec.  Close monitoring on tele   2/22; Patient resting in bed, denies any chest pain this AM, cardiology following and plan is for cardiaca cath today. Appreciate rec.   Cont on heparin drip   Monitor on tele   2/23: patient seen and examined had cardiac cath done yesterday  which showed  to D1 with an unsuccessful attempt to open.   Appreciate cardiology rec.   Will start him back on warfarin and bridging with Lovenox for his h/o PE and DVT   2/24: Patient seen and examined denies chest pain, afebrile,  cont on warfarin and bridging with therapeutic dose of Lovenox I  Monitor INR   Hypokalemia: replete   I have discussed this patient's plan of care and discharge plan at IDT rounds today with Case Management, Nursing, Nursing leadership, and other members of the IDT team.    Consultants/Specialty  cardiology    Code Status  Full Code    Disposition  Patient is not medically cleared for discharge.   Anticipate discharge to to home with close outpatient follow-up.  I have placed the appropriate orders for post-discharge needs.    Review of Systems  Review of Systems   Constitutional:  Negative for chills and fever.   HENT:  Negative for congestion and hearing loss.    Eyes:  Negative for blurred vision and pain.   Respiratory:  Negative for cough and shortness of breath.    Cardiovascular:  Negative for  chest pain and palpitations.   Gastrointestinal:  Negative for abdominal pain, nausea and vomiting.   Genitourinary:  Negative for dysuria and hematuria.   Musculoskeletal:  Negative for back pain and joint pain.   Skin:  Negative for itching and rash.   Neurological:  Negative for dizziness and headaches.   Psychiatric/Behavioral:  The patient is not nervous/anxious and does not have insomnia.    All other systems reviewed and are negative.     Physical Exam  Temp:  [36.9 °C (98.4 °F)-37.3 °C (99.1 °F)] 37.3 °C (99.1 °F)  Pulse:  [53-61] 61  Resp:  [14-18] 16  BP: (112-118)/(63-73) 115/70  SpO2:  [91 %-94 %] 91 %    Physical Exam  Vitals and nursing note reviewed.   Constitutional:       General: He is not in acute distress.     Appearance: He is obese. He is not diaphoretic.   HENT:      Head: Normocephalic and atraumatic.      Right Ear: External ear normal.      Left Ear: External ear normal.      Nose: Nose normal. No congestion.   Eyes:      General: No scleral icterus.     Extraocular Movements: Extraocular movements intact.   Cardiovascular:      Rate and Rhythm: Normal rate and regular rhythm.      Pulses: Normal pulses.      Heart sounds: Normal heart sounds. No murmur heard.     Comments: Difficult to auscultate due to body habitus, distant heart sounds  Pulmonary:      Effort: Pulmonary effort is normal. No respiratory distress.      Breath sounds: Normal breath sounds. No wheezing or rales.      Comments: Difficult to auscultate due to body habitus  Abdominal:      General: Abdomen is flat. Bowel sounds are normal. There is no distension.      Palpations: Abdomen is soft.      Tenderness: There is no abdominal tenderness. There is no guarding or rebound.   Musculoskeletal:         General: No swelling or tenderness. Normal range of motion.      Cervical back: Normal range of motion and neck supple.   Skin:     General: Skin is warm.      Capillary Refill: Capillary refill takes less than 2 seconds.       Coloration: Skin is not jaundiced or pale.      Findings: No erythema.   Neurological:      General: No focal deficit present.      Mental Status: He is alert and oriented to person, place, and time. Mental status is at baseline.      Motor: No weakness.   Psychiatric:         Mood and Affect: Mood normal.         Behavior: Behavior normal.         Thought Content: Thought content normal.         Judgment: Judgment normal.       Fluids    Intake/Output Summary (Last 24 hours) at 2/24/2023 1156  Last data filed at 2/24/2023 0600  Gross per 24 hour   Intake 240 ml   Output --   Net 240 ml         Laboratory  Recent Labs     02/22/23  0244 02/23/23  0313   WBC 6.1 5.9   RBC 4.49* 4.50*   HEMOGLOBIN 14.7 14.7   HEMATOCRIT 41.7* 41.5*   MCV 92.9 92.2   MCH 32.7 32.7   MCHC 35.3 35.4*   RDW 42.0 40.3   PLATELETCT 180 168   MPV 9.9 9.9     Recent Labs     02/22/23 0244 02/23/23  0313   SODIUM 139 138   POTASSIUM 3.6 3.4*   CHLORIDE 105 105   CO2 23 24   GLUCOSE 150* 129*   BUN 15 14   CREATININE 0.75 0.79   CALCIUM 8.6 8.5     Recent Labs     02/22/23  0244 02/23/23  0313 02/24/23  0204   INR 1.65* 1.39* 1.30*                 Imaging  EC-ECHOCARDIOGRAM COMPLETE W/O CONT   Final Result      CL-LEFT HEART CATHETERIZATION WITH POSSIBLE INTERVENTION    (Results Pending)        Assessment/Plan  * NSTEMI (non-ST elevated myocardial infarction) (HCC)- (present on admission)  Assessment & Plan  Typical chest pain  NSTEMI, troponin was highly elevated 390 at outside  - asa, statin continue  - continue home losartan  - hold warfarin  - trending troponins  -rodolfo cardiac cath showing  which showed  to D1 with an unsuccessful attempt to open.   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)  Assessment & Plan  With hiatal hernia  On chronic omperazole  continue    Prediabetes- (present on admission)  Assessment & Plan   A1c 6.7  Hold metformin,  Recheck  A1c, ISS, accuchecks, hypoglycemia protocol    Hypertension- (present on admission)  Assessment & Plan  Cont on losartan   Monitor and adjust as needed       Hx pulmonary embolism- (present on admission)  Assessment & Plan  On warfarin, was initially held   Will restart it and bridge with lovenox            VTE prophylaxis: therapeutic anticoagulation with heparin drip     I have performed a physical exam and reviewed and updated ROS and Plan today (2/24/2023). In review of yesterday's note (2/23/2023), there are no changes except as documented above.

## 2023-02-25 ENCOUNTER — PHARMACY VISIT (OUTPATIENT)
Dept: PHARMACY | Facility: MEDICAL CENTER | Age: 63
End: 2023-02-25
Payer: COMMERCIAL

## 2023-02-25 VITALS
HEART RATE: 57 BPM | HEIGHT: 70 IN | WEIGHT: 254.63 LBS | RESPIRATION RATE: 17 BRPM | SYSTOLIC BLOOD PRESSURE: 103 MMHG | BODY MASS INDEX: 36.45 KG/M2 | DIASTOLIC BLOOD PRESSURE: 52 MMHG | TEMPERATURE: 98.6 F | OXYGEN SATURATION: 92 %

## 2023-02-25 LAB
ANION GAP SERPL CALC-SCNC: 9 MMOL/L (ref 7–16)
BUN SERPL-MCNC: 13 MG/DL (ref 8–22)
CALCIUM SERPL-MCNC: 8.7 MG/DL (ref 8.5–10.5)
CHLORIDE SERPL-SCNC: 105 MMOL/L (ref 96–112)
CO2 SERPL-SCNC: 23 MMOL/L (ref 20–33)
CREAT SERPL-MCNC: 0.77 MG/DL (ref 0.5–1.4)
ERYTHROCYTE [DISTWIDTH] IN BLOOD BY AUTOMATED COUNT: 40 FL (ref 35.9–50)
GFR SERPLBLD CREATININE-BSD FMLA CKD-EPI: 100 ML/MIN/1.73 M 2
GLUCOSE BLD STRIP.AUTO-MCNC: 135 MG/DL (ref 65–99)
GLUCOSE SERPL-MCNC: 153 MG/DL (ref 65–99)
HCT VFR BLD AUTO: 40.6 % (ref 42–52)
HGB BLD-MCNC: 14.4 G/DL (ref 14–18)
INR PPP: 1.36 (ref 0.87–1.13)
MCH RBC QN AUTO: 32.4 PG (ref 27–33)
MCHC RBC AUTO-ENTMCNC: 35.5 G/DL (ref 33.7–35.3)
MCV RBC AUTO: 91.4 FL (ref 81.4–97.8)
PLATELET # BLD AUTO: 189 K/UL (ref 164–446)
PMV BLD AUTO: 9.8 FL (ref 9–12.9)
POTASSIUM SERPL-SCNC: 3.9 MMOL/L (ref 3.6–5.5)
PROTHROMBIN TIME: 16.5 SEC (ref 12–14.6)
RBC # BLD AUTO: 4.44 M/UL (ref 4.7–6.1)
SODIUM SERPL-SCNC: 137 MMOL/L (ref 135–145)
WBC # BLD AUTO: 5.9 K/UL (ref 4.8–10.8)

## 2023-02-25 PROCEDURE — 99239 HOSP IP/OBS DSCHRG MGMT >30: CPT | Performed by: INTERNAL MEDICINE

## 2023-02-25 PROCEDURE — 85027 COMPLETE CBC AUTOMATED: CPT

## 2023-02-25 PROCEDURE — 36415 COLL VENOUS BLD VENIPUNCTURE: CPT

## 2023-02-25 PROCEDURE — 85610 PROTHROMBIN TIME: CPT

## 2023-02-25 PROCEDURE — A9270 NON-COVERED ITEM OR SERVICE: HCPCS | Performed by: INTERNAL MEDICINE

## 2023-02-25 PROCEDURE — RXMED WILLOW AMBULATORY MEDICATION CHARGE: Performed by: INTERNAL MEDICINE

## 2023-02-25 PROCEDURE — 82962 GLUCOSE BLOOD TEST: CPT

## 2023-02-25 PROCEDURE — 700111 HCHG RX REV CODE 636 W/ 250 OVERRIDE (IP): Performed by: INTERNAL MEDICINE

## 2023-02-25 PROCEDURE — 80048 BASIC METABOLIC PNL TOTAL CA: CPT

## 2023-02-25 PROCEDURE — 700102 HCHG RX REV CODE 250 W/ 637 OVERRIDE(OP): Performed by: INTERNAL MEDICINE

## 2023-02-25 RX ORDER — ROSUVASTATIN CALCIUM 40 MG/1
40 TABLET, COATED ORAL EVERY EVENING
Qty: 30 TABLET | Refills: 0 | Status: SHIPPED | OUTPATIENT
Start: 2023-02-25

## 2023-02-25 RX ORDER — ENOXAPARIN SODIUM 150 MG/ML
120 INJECTION SUBCUTANEOUS EVERY 12 HOURS
Qty: 8 EACH | Refills: 0 | Status: ACTIVE | OUTPATIENT
Start: 2023-02-25 | End: 2023-03-01

## 2023-02-25 RX ORDER — ENOXAPARIN SODIUM 150 MG/ML
120 INJECTION SUBCUTANEOUS EVERY 12 HOURS
Qty: 10 EACH | Refills: 0 | Status: SHIPPED | OUTPATIENT
Start: 2023-02-25 | End: 2023-02-25 | Stop reason: SDUPTHER

## 2023-02-25 RX ORDER — LOSARTAN POTASSIUM 100 MG/1
100 TABLET ORAL DAILY
Qty: 30 TABLET | Refills: 0 | Status: SHIPPED | OUTPATIENT
Start: 2023-02-26

## 2023-02-25 RX ADMIN — ENOXAPARIN SODIUM 120 MG: 120 INJECTION SUBCUTANEOUS at 10:00

## 2023-02-25 RX ADMIN — SENNOSIDES AND DOCUSATE SODIUM 2 TABLET: 50; 8.6 TABLET ORAL at 06:00

## 2023-02-25 RX ADMIN — LOSARTAN POTASSIUM 100 MG: 50 TABLET, FILM COATED ORAL at 06:00

## 2023-02-25 ASSESSMENT — PAIN DESCRIPTION - PAIN TYPE: TYPE: ACUTE PAIN

## 2023-02-25 NOTE — PROGRESS NOTES
Sitting on side of bed, denied chest/epig pain, pleasant and coumadin and INR monitoring discussed with patient, questions answered and verbalized understanding.

## 2023-02-25 NOTE — DISCHARGE SUMMARY
Discharge Summary    CHIEF COMPLAINT ON ADMISSION  No chief complaint on file.      Reason for Admission  NSTEMI     Admission Date  2/20/2023    CODE STATUS  Prior    HPI & HOSPITAL COURSE  This is a 62 y.o. male with PMHX of HTN, hx of PE, now on warafarin failed DOAC, Moody's, hiatal hernia who presented 2/20/2023 with chest pain to outside facility where he was found also to have an elevated troponin he was given aspirin and transferred  here for cardiology evaluation.  Patient was started on heparin drip and warfarin was held. Patient had a LHC which showed  to D1 with an unsuccessful attempt to open.  Patient was continued on losartan no beta blocker due to bradycardia. His INR has dropped below 2 so he was started on back on warfarin and bridged with Lovenox  Per cardiology needs to follow up as outpatient.  Patient now doing better will discharge p[patient home on warfarin and also on Lovenox for bridging and he will follow up on anticoagulation clinic      The patient met 2-midnight criteria for an inpatient stay at the time of discharge.    Discharge Date  2/25/2023    FOLLOW UP ITEMS POST DISCHARGE  Cardiology     DISCHARGE DIAGNOSES  Principal Problem:    NSTEMI (non-ST elevated myocardial infarction) (HCC) POA: Yes  Active Problems:    Hx pulmonary embolism POA: Yes    Hypertension POA: Yes    Prediabetes POA: Yes    Moody esophagus (Chronic) POA: Yes    Hypokalemia POA: Unknown  Resolved Problems:    * No resolved hospital problems. *      FOLLOW UP  Future Appointments   Date Time Provider Department Center   2/27/2023  9:30 AM [facility] EXAM 7 VMED None     Laurent Moser M.D.  [address] [facility] [address]  [phone]    Schedule an appointment as soon as possible for a visit in 2 week(s)  for hospital follow up      MEDICATIONS ON DISCHARGE     Medication List        START taking these medications        Instructions   enoxaparin 120 MG/0.8ML Sosy inj  Commonly known as:  Lovenox   Inject 1 syringe (120 mg) under the skin every 12 hours for 4 days.  Dose: 120 mg     rosuvastatin 40 MG tablet  Commonly known as: CRESTOR   Take 1 Tablet by mouth every evening.  Dose: 40 mg            CHANGE how you take these medications        Instructions   losartan 100 MG Tabs  Start taking on: February 26, 2023  What changed: medication strength  Commonly known as: COZAAR   Take 1 Tablet by mouth every day.  Dose: 100 mg            CONTINUE taking these medications        Instructions   metFORMIN 500 MG Tabs  Commonly known as: GLUCOPHAGE   Take 500 mg by mouth 2 times a day with meals.  Dose: 500 mg     omeprazole 40 MG delayed-release capsule  Commonly known as: PRILOSEC   Take 40 mg by mouth every day.  Dose: 40 mg            STOP taking these medications      hydroCHLOROthiazide 50 MG Tabs  Commonly known as: HYDRODIURIL            ASK your doctor about these medications        Instructions   warfarin 5 MG Tabs  Commonly known as: COUMADIN   Take 1-1.5 Tablets by mouth every day. As directed by [facility]  Dose: 5-7.5 mg              Allergies  No Known Allergies    DIET  No orders of the defined types were placed in this encounter.      ACTIVITY  As tolerated.  Weight bearing as tolerated    CONSULTATIONS  Cardiology     PROCEDURES  Left heart cath     LABORATORY  Lab Results   Component Value Date    SODIUM 137 02/25/2023    POTASSIUM 3.9 02/25/2023    CHLORIDE 105 02/25/2023    CO2 23 02/25/2023    GLUCOSE 153 (H) 02/25/2023    BUN 13 02/25/2023    CREATININE 0.77 02/25/2023        Lab Results   Component Value Date    WBC 5.9 02/25/2023    HEMOGLOBIN 14.4 02/25/2023    HEMATOCRIT 40.6 (L) 02/25/2023    PLATELETCT 189 02/25/2023        Total time of the discharge process exceeds 42 minutes.

## 2023-02-25 NOTE — CARE PLAN
The patient is Stable - Low risk of patient condition declining or worsening    Shift Goals  Clinical Goals: stable v/s, therapeutic inr  Patient Goals: go home  Family Goals: ALANIS    Progress made toward(s) clinical / shift goals:      Problem: Knowledge Deficit - Standard  Goal: Patient and family/care givers will demonstrate understanding of plan of care, disease process/condition, diagnostic tests and medications  Description: Target End Date:  1-3 days or as soon as patient condition allows    Document in Patient Education    1.  Patient and family/caregiver oriented to unit, equipment, visitation policy and means for communicating concern  2.  Complete/review Learning Assessment  3.  Assess knowledge level of disease process/condition, treatment plan, diagnostic tests and medications  4.  Explain disease process/condition, treatment plan, diagnostic tests and medications  Outcome: Progressing       Patient is not progressing towards the following goals:

## 2023-02-27 ENCOUNTER — APPOINTMENT (OUTPATIENT)
Dept: VASCULAR LAB | Facility: MEDICAL CENTER | Age: 63
End: 2023-02-27
Attending: NURSE PRACTITIONER
Payer: COMMERCIAL

## 2023-02-27 ENCOUNTER — HOSPITAL ENCOUNTER (OUTPATIENT)
Dept: RADIOLOGY | Facility: MEDICAL CENTER | Age: 63
End: 2023-02-27

## 2023-02-27 ENCOUNTER — ANTICOAGULATION MONITORING (OUTPATIENT)
Dept: VASCULAR LAB | Facility: MEDICAL CENTER | Age: 63
End: 2023-02-27
Payer: COMMERCIAL

## 2023-02-27 DIAGNOSIS — I82.409 DEEP VEIN THROMBOSIS (DVT) OF LOWER EXTREMITY, UNSPECIFIED CHRONICITY, UNSPECIFIED LATERALITY, UNSPECIFIED VEIN (HCC): ICD-10-CM

## 2023-02-27 DIAGNOSIS — I26.99 ACUTE PULMONARY EMBOLISM, UNSPECIFIED PULMONARY EMBOLISM TYPE, UNSPECIFIED WHETHER ACUTE COR PULMONALE PRESENT (HCC): ICD-10-CM

## 2023-02-27 LAB — INR PPP: 1.7 (ref 2–3.5)

## 2023-02-27 PROCEDURE — 99213 OFFICE O/P EST LOW 20 MIN: CPT | Performed by: NURSE PRACTITIONER

## 2023-02-27 NOTE — PROGRESS NOTES
OP Anticoagulation Service Note    Date: 23       Plan: Pt is subtherapeutic. He was hospitalized the last wk for MI; med changes include no BB or HCTZ, started on statin and on Lovenox bridge at present.  Denies any s/s of bleeding or clotting.  Will continue warfarin dosing as follows. Follow up appointment in 48hrs via [facility] lab.   Vitals: /65, HR 57, 96%    CONTINUE LOVENOX 120MG BID  MON/TU WARFARIN 7.5MG EACH DAY  INR WED 3/1 at lab (SO placed for [facility])      This evaluation was conducted via telemedicine visit using secure and encrypted videoconferencing technology.    The patient was physically located at [facility] in [place], [place]. The patient was presented by [facility].  The patient's identity was confirmed and verbal consent for the telemedicine encounter was obtained.     Anticoagulation Summary  As of 2023      INR goal:  2.0-3.0   TTR:  66.0 % (7.8 mo)   INR used for dosin.70 (2023)   Warfarin maintenance plan:  7.5 mg (5 mg x 1.5) every Mon, Fri; 5 mg (5 mg x 1) all other days   Weekly warfarin total:  40 mg   Plan last modified:  PAULETTE SalasPYohanNYohan (2023)   Next INR check:  3/1/2023   Target end date:  3/12/2023    Indications    Acute pulmonary embolism (HCC) [I26.99]  Deep vein thrombosis (HCC) [I82.409]                 Anticoagulation Episode Summary       INR check location:      Preferred lab:      Send INR reminders to:      Comments:  9 months of therapy then re-eval with pt's PCP, Dr Laurent Moser  Possible Eliquis failure              Review of Systems   HENT: Negative for nosebleeds.   Respiratory: Negative for shortness of breath.  Gastrointestinal: Negative for blood in stool.   Genitourinary: Negative for hematuria.   Psychiatric/Behavioral: The patient is not nervous/anxious.     Physical Exam   Constitutional: Oriented to person, place, and time. Appears well-developed and well-nourished.    Pulmonary/Chest: Effort normal. No respiratory distress.   Skin: Not diaphoretic.  Psychiatric: Normal mood and affect. Behavior is normal.      LOKI Snyder  [facility]

## 2023-03-01 ENCOUNTER — ANTICOAGULATION MONITORING (OUTPATIENT)
Dept: VASCULAR LAB | Facility: MEDICAL CENTER | Age: 63
End: 2023-03-01
Payer: COMMERCIAL

## 2023-03-01 DIAGNOSIS — I26.99 ACUTE PULMONARY EMBOLISM, UNSPECIFIED PULMONARY EMBOLISM TYPE, UNSPECIFIED WHETHER ACUTE COR PULMONALE PRESENT (HCC): ICD-10-CM

## 2023-03-01 DIAGNOSIS — I82.409 DEEP VEIN THROMBOSIS (DVT) OF LOWER EXTREMITY, UNSPECIFIED CHRONICITY, UNSPECIFIED LATERALITY, UNSPECIFIED VEIN (HCC): ICD-10-CM

## 2023-03-01 LAB — INR PPP: 2.5 (ref 2–3.5)

## 2023-03-01 NOTE — PROGRESS NOTES
Anticoagulation Summary  As of 3/1/2023      INR goal:  2.0-3.0   TTR:  66.0 % (7.9 mo)   INR used for dosin.50 (3/1/2023)   Warfarin maintenance plan:  7.5 mg (5 mg x 1.5) every Mon, Fri; 5 mg (5 mg x 1) all other days   Weekly warfarin total:  40 mg   Plan last modified:  Silvia Smalls A.P.NYohan (2023)   Next INR check:  3/6/2023   Target end date:  3/12/2023    Indications    Acute pulmonary embolism (HCC) [I26.99]  Deep vein thrombosis (HCC) [I82.409]                 Anticoagulation Episode Summary       INR check location:      Preferred lab:      Send INR reminders to:      Comments:  9 months of therapy then re-eval with pt's PCP, Dr Laurent Moser  Possible Eliquis failure              Spoke with patient to report a therapeutic INR.      Instructed patient to discontinue lovenox bridge as he is therapeutic.     Pt instructed to continue with current warfarin dosing regimen, confirmed dosing.     Will follow up in 5 days.      Eulalia Pozo, Pharmacy Intern

## 2023-03-06 ENCOUNTER — APPOINTMENT (OUTPATIENT)
Dept: VASCULAR LAB | Facility: MEDICAL CENTER | Age: 63
End: 2023-03-06
Attending: NURSE PRACTITIONER
Payer: COMMERCIAL

## 2023-03-06 ENCOUNTER — ANTICOAGULATION MONITORING (OUTPATIENT)
Dept: VASCULAR LAB | Facility: MEDICAL CENTER | Age: 63
End: 2023-03-06
Payer: COMMERCIAL

## 2023-03-06 DIAGNOSIS — I26.99 ACUTE PULMONARY EMBOLISM, UNSPECIFIED PULMONARY EMBOLISM TYPE, UNSPECIFIED WHETHER ACUTE COR PULMONALE PRESENT (HCC): ICD-10-CM

## 2023-03-06 DIAGNOSIS — I82.409 DEEP VEIN THROMBOSIS (DVT) OF LOWER EXTREMITY, UNSPECIFIED CHRONICITY, UNSPECIFIED LATERALITY, UNSPECIFIED VEIN (HCC): ICD-10-CM

## 2023-03-06 LAB — INR PPP: 2.3 (ref 2–3.5)

## 2023-03-06 PROCEDURE — 99213 OFFICE O/P EST LOW 20 MIN: CPT | Mod: 95 | Performed by: NURSE PRACTITIONER

## 2023-03-06 NOTE — PROGRESS NOTES
OP Anticoagulation Service Note    Date: 23       Plan: Pt is therapeutic. Denies any s/s of bleeding or clotting. Denies any changes in medications or diet. Will continue warfarin dosing as follows. Follow up appointment in 2 week(s).   Vitals: /80, HR 59, 96%  Urged to make f/u with either cards or PCP this week to discuss BP; had recent MI      This evaluation was conducted via telemedicine visit using secure and encrypted videoconferencing technology.    The patient was physically located at [facility] in [place], [place]. The patient was presented by [facility].  The patient's identity was confirmed and verbal consent for the telemedicine encounter was obtained.     Anticoagulation Summary  As of 3/6/2023      INR goal:  2.0-3.0   TTR:  66.7 % (8.1 mo)   INR used for dosin.30 (3/6/2023)   Warfarin maintenance plan:  7.5 mg (5 mg x 1.5) every Mon, Fri; 5 mg (5 mg x 1) all other days   Weekly warfarin total:  40 mg   Plan last modified:  PAULETTE SalasPYohanNYohan (2023)   Next INR check:  3/20/2023   Target end date:  3/12/2023    Indications    Acute pulmonary embolism (HCC) [I26.99]  Deep vein thrombosis (HCC) [I82.409]                 Anticoagulation Episode Summary       INR check location:      Preferred lab:      Send INR reminders to:      Comments:  9 months of therapy then re-eval with pt's PCP, Dr Laurent Moser  Possible Eliquis failure              Review of Systems   HENT: Negative for nosebleeds.   Respiratory: Negative for shortness of breath.  Gastrointestinal: Negative for blood in stool.   Genitourinary: Negative for hematuria.   Psychiatric/Behavioral: The patient is not nervous/anxious.     Physical Exam   Constitutional: Oriented to person, place, and time. Appears well-developed and well-nourished.   Pulmonary/Chest: Effort normal. No respiratory distress.   Skin: Not diaphoretic.  Psychiatric: Normal mood and affect.  Behavior is normal.      LOKI Snyder  [place] [facility]

## 2023-03-20 ENCOUNTER — APPOINTMENT (OUTPATIENT)
Dept: VASCULAR LAB | Facility: MEDICAL CENTER | Age: 63
End: 2023-03-20
Attending: NURSE PRACTITIONER
Payer: COMMERCIAL

## 2023-03-20 ENCOUNTER — ANTICOAGULATION MONITORING (OUTPATIENT)
Dept: VASCULAR LAB | Facility: MEDICAL CENTER | Age: 63
End: 2023-03-20
Payer: COMMERCIAL

## 2023-03-20 DIAGNOSIS — I26.99 ACUTE PULMONARY EMBOLISM, UNSPECIFIED PULMONARY EMBOLISM TYPE, UNSPECIFIED WHETHER ACUTE COR PULMONALE PRESENT (HCC): ICD-10-CM

## 2023-03-20 DIAGNOSIS — I82.409 DEEP VEIN THROMBOSIS (DVT) OF LOWER EXTREMITY, UNSPECIFIED CHRONICITY, UNSPECIFIED LATERALITY, UNSPECIFIED VEIN (HCC): ICD-10-CM

## 2023-03-20 LAB — INR PPP: 2.5 (ref 2–3.5)

## 2023-03-20 PROCEDURE — 99213 OFFICE O/P EST LOW 20 MIN: CPT | Performed by: NURSE PRACTITIONER

## 2023-03-20 RX ORDER — SPIRONOLACTONE 50 MG/1
50 TABLET, FILM COATED ORAL DAILY
COMMUNITY
End: 2024-03-04

## 2023-03-20 RX ORDER — CARVEDILOL 3.12 MG/1
3.12 TABLET ORAL 2 TIMES DAILY WITH MEALS
COMMUNITY
End: 2024-03-04

## 2023-03-20 NOTE — PROGRESS NOTES
OP Anticoagulation Service Note    Date: 23       Plan: Pt is therapeutic. Denies any s/s of bleeding or clotting. Denies any changes in diet. He was recently started on spironolactone and carvedilol; following closely with PCP and cardiology, locally in [place].  BP elevated today.  Will continue warfarin dosing as follows. Follow up appointment in 4 week(s).   Vitals: /91, HR 58, 97%      This evaluation was conducted via telemedicine visit using secure and encrypted videoconferencing technology.    The patient was physically located at [facility] in [place], [place]. The patient was presented by [facility].  The patient's identity was confirmed and verbal consent for the telemedicine encounter was obtained.     Anticoagulation Summary  As of 3/20/2023      INR goal:  2.0-3.0   TTR:  68.6 % (8.5 mo)   INR used for dosin.50 (3/20/2023)   Warfarin maintenance plan:  7.5 mg (5 mg x 1.5) every Mon, Fri; 5 mg (5 mg x 1) all other days   Weekly warfarin total:  40 mg   Plan last modified:  AMBAR SalasNYohan (2023)   Next INR check:  2023   Target end date:  3/12/2023    Indications    Acute pulmonary embolism (HCC) [I26.99]  Deep vein thrombosis (HCC) [I82.409]                 Anticoagulation Episode Summary       INR check location:      Preferred lab:      Send INR reminders to:      Comments:  9 months of therapy then re-eval with pt's PCP, Dr Laurent Moser  Possible Eliquis failure              Review of Systems   HENT: Negative for nosebleeds.   Respiratory: Negative for shortness of breath.  Gastrointestinal: Negative for blood in stool.   Genitourinary: Negative for hematuria.   Psychiatric/Behavioral: The patient is not nervous/anxious.     Physical Exam   Constitutional: Oriented to person, place, and time. Appears well-developed and well-nourished.   Pulmonary/Chest: Effort normal. No respiratory distress.   Skin: Not  diaphoretic.  Psychiatric: Normal mood and affect. Behavior is normal.      LOKI Snyder  [facility]

## 2023-04-12 ENCOUNTER — TELEPHONE (OUTPATIENT)
Dept: CARDIOLOGY | Facility: MEDICAL CENTER | Age: 63
End: 2023-04-12
Payer: COMMERCIAL

## 2023-04-12 NOTE — TELEPHONE ENCOUNTER
----- Message from [facility] sent at 3/28/2023 10:49 AM PDT -----  Regarding: RE: Appointment Needed  Good morning,    For telemedicine yes we need a referral. We have different codes that need to be used to make sure this visit will be covered by the insurance. We also need to make sure the patients diagnosis qualifies for telemedicine. As this is a hospital follow up we are currently booking end of June or July for telemed appointments. Patient may need to be seen sooner in office.    Thank you,  Leticia DALE  ----- Message -----  From: [facility]  Sent: 3/28/2023  10:24 AM PDT  To: [facility]  Subject: RE: Appointment Needed                           Alexandrea,    Per Griffin Mansfield, this is a Hospital Follow-up. Does the PT still need a referral?  Please schedule follow-up appointment 1 to 2 weeks with MD or APRN.    Thank you.      ----- Message -----  From: [facility]  Sent: 3/15/2023   2:55 PM PDT  To: [facility]ia [facility], #  Subject: RE: Appointment Needed                           Good afternoon,    The patient has never been seen by our cardiology department. Please place a referral so we may have it authorized for Telemedicine.    Thank you,  Telemed team  ----- Message -----  From: [facility]  Sent: 3/3/2023  10:51 AM PDT  To: Telemedicine Scheduling  Subject: Appointment Needed                               Good morning,    Please schedule Follow-Up Appointment.    Thank you.

## 2023-04-24 ENCOUNTER — ANTICOAGULATION MONITORING (OUTPATIENT)
Dept: VASCULAR LAB | Facility: MEDICAL CENTER | Age: 63
End: 2023-04-24
Payer: COMMERCIAL

## 2023-04-24 ENCOUNTER — APPOINTMENT (OUTPATIENT)
Dept: VASCULAR LAB | Facility: MEDICAL CENTER | Age: 63
End: 2023-04-24
Attending: NURSE PRACTITIONER
Payer: COMMERCIAL

## 2023-04-24 VITALS — HEART RATE: 61 BPM | OXYGEN SATURATION: 96 % | SYSTOLIC BLOOD PRESSURE: 125 MMHG | DIASTOLIC BLOOD PRESSURE: 70 MMHG

## 2023-04-24 DIAGNOSIS — I26.99 ACUTE PULMONARY EMBOLISM, UNSPECIFIED PULMONARY EMBOLISM TYPE, UNSPECIFIED WHETHER ACUTE COR PULMONALE PRESENT (HCC): ICD-10-CM

## 2023-04-24 DIAGNOSIS — I82.409 DEEP VEIN THROMBOSIS (DVT) OF LOWER EXTREMITY, UNSPECIFIED CHRONICITY, UNSPECIFIED LATERALITY, UNSPECIFIED VEIN (HCC): ICD-10-CM

## 2023-04-24 LAB — INR PPP: 2.3 (ref 2–3.5)

## 2023-04-24 PROCEDURE — 99213 OFFICE O/P EST LOW 20 MIN: CPT | Performed by: NURSE PRACTITIONER

## 2023-04-24 NOTE — PROGRESS NOTES
OP Anticoagulation Service Note    Date: 23       Plan: Pt is therapeutic. Denies any s/s of bleeding or clotting. Denies any changes in medications or diet. Will continue warfarin dosing as follows. Follow up appointment in 6 week(s).   Vitals:   Vitals:    23 0955   BP: 125/70   Pulse: 61   SpO2: 96%           This evaluation was conducted via telemedicine visit using secure and encrypted videoconferencing technology.    The patient was physically located at [facility] in [place], [place]. The patient was presented by [facility].  The patient's identity was confirmed and verbal consent for the telemedicine encounter was obtained.     Anticoagulation Summary  As of 2023      INR goal:  2.0-3.0   TTR:  72.3 % (9.7 mo)   INR used for dosin.30 (2023)   Warfarin maintenance plan:  7.5 mg (5 mg x 1.5) every Mon, Fri; 5 mg (5 mg x 1) all other days   Weekly warfarin total:  40 mg   Plan last modified:  AMBAR SalasNYohan (2023)   Next INR check:  2023   Target end date:  3/12/2023    Indications    Acute pulmonary embolism (HCC) [I26.99]  Deep vein thrombosis (HCC) [I82.409]                 Anticoagulation Episode Summary       INR check location:      Preferred lab:      Send INR reminders to:      Comments:  9 months of therapy then re-eval with pt's PCP, Dr Laurent Moser  Possible Eliquis failure              Review of Systems   HENT: Negative for nosebleeds.   Respiratory: Negative for shortness of breath.  Gastrointestinal: Negative for blood in stool.   Genitourinary: Negative for hematuria.   Psychiatric/Behavioral: The patient is not nervous/anxious.     Physical Exam   Constitutional: Oriented to person, place, and time. Appears well-developed and well-nourished.   Pulmonary/Chest: Effort normal. No respiratory distress.   Skin: Not diaphoretic.  Psychiatric: Normal mood and affect. Behavior is normal.      Silvia  LOKI Smalls  [place] [facility]

## 2023-04-25 PROBLEM — E78.2 MIXED HYPERLIPIDEMIA: Status: ACTIVE | Noted: 2023-04-25

## 2023-04-25 PROBLEM — I26.99 ACUTE PULMONARY EMBOLISM (HCC): Status: RESOLVED | Noted: 2022-06-20 | Resolved: 2023-04-25

## 2023-04-25 PROBLEM — D68.318 CIRCULATING ANTICOAGULANTS (HCC): Status: ACTIVE | Noted: 2023-04-25

## 2023-06-05 ENCOUNTER — APPOINTMENT (OUTPATIENT)
Dept: VASCULAR LAB | Facility: MEDICAL CENTER | Age: 63
End: 2023-06-05
Attending: NURSE PRACTITIONER
Payer: COMMERCIAL

## 2023-06-05 ENCOUNTER — ANTICOAGULATION MONITORING (OUTPATIENT)
Dept: VASCULAR LAB | Facility: MEDICAL CENTER | Age: 63
End: 2023-06-05
Payer: COMMERCIAL

## 2023-06-05 VITALS — OXYGEN SATURATION: 97 % | HEART RATE: 62 BPM | DIASTOLIC BLOOD PRESSURE: 64 MMHG | SYSTOLIC BLOOD PRESSURE: 122 MMHG

## 2023-06-05 DIAGNOSIS — I82.409 DEEP VEIN THROMBOSIS (DVT) OF LOWER EXTREMITY, UNSPECIFIED CHRONICITY, UNSPECIFIED LATERALITY, UNSPECIFIED VEIN (HCC): ICD-10-CM

## 2023-06-05 LAB — INR PPP: 2.5 (ref 2–3.5)

## 2023-06-05 PROCEDURE — 3074F SYST BP LT 130 MM HG: CPT | Performed by: NURSE PRACTITIONER

## 2023-06-05 PROCEDURE — 99213 OFFICE O/P EST LOW 20 MIN: CPT | Performed by: NURSE PRACTITIONER

## 2023-06-05 PROCEDURE — 3078F DIAST BP <80 MM HG: CPT | Performed by: NURSE PRACTITIONER

## 2023-06-05 NOTE — PROGRESS NOTES
OP Anticoagulation Service Note    Date: 23       Plan: Pt is therapeutic. Denies any s/s of bleeding or clotting. Denies any changes in medications or diet. Will continue warfarin dosing as follows. Follow up appointment in 8 week(s).   Vitals:   Vitals:    23 0943   BP: 122/64   Pulse: 62   SpO2: 97%           This evaluation was conducted via telemedicine visit using secure and encrypted videoconferencing technology.    The patient was physically located at [facility] in [place], [place]. The patient was presented by [facility].  The patient's identity was confirmed and verbal consent for the telemedicine encounter was obtained.     Anticoagulation Summary  As of 2023      INR goal:  2.0-3.0   TTR:  75.7 % (11.1 mo)   INR used for dosin.50 (2023)   Warfarin maintenance plan:  7.5 mg (5 mg x 1.5) every Mon, Fri; 5 mg (5 mg x 1) all other days   Weekly warfarin total:  40 mg   Plan last modified:  PAULETTE SalasPYohanNYohan (2023)   Next INR check:  2023   Target end date:  3/12/2023    Indications    Acute pulmonary embolism (HCC) (Resolved) [I26.99]  Deep vein thrombosis (HCC) [I82.409]                 Anticoagulation Episode Summary       INR check location:      Preferred lab:      Send INR reminders to:      Comments:  9 months of therapy then re-eval with pt's PCP, Dr Laurent Moser  Possible Eliquis failure            Review of Systems   HENT: Negative for nosebleeds.   Respiratory: Negative for shortness of breath.  Gastrointestinal: Negative for blood in stool.   Genitourinary: Negative for hematuria.   Psychiatric/Behavioral: The patient is not nervous/anxious.     Physical Exam   Constitutional: Oriented to person, place, and time. Appears well-developed and well-nourished.   Pulmonary/Chest: Effort normal. No respiratory distress.   Skin: Not diaphoretic.  Psychiatric: Normal mood and affect. Behavior is  normal.      LOKI Snyder  [place] [facility]

## 2023-06-21 ENCOUNTER — APPOINTMENT (RX ONLY)
Dept: URBAN - METROPOLITAN AREA CLINIC 35 | Facility: CLINIC | Age: 63
Setting detail: DERMATOLOGY
End: 2023-06-21

## 2023-06-21 DIAGNOSIS — Z85.828 PERSONAL HISTORY OF OTHER MALIGNANT NEOPLASM OF SKIN: ICD-10-CM

## 2023-06-21 DIAGNOSIS — D18.0 HEMANGIOMA: ICD-10-CM

## 2023-06-21 DIAGNOSIS — L20.89 OTHER ATOPIC DERMATITIS: ICD-10-CM | Status: WELL CONTROLLED

## 2023-06-21 DIAGNOSIS — Z71.89 OTHER SPECIFIED COUNSELING: ICD-10-CM

## 2023-06-21 DIAGNOSIS — L81.4 OTHER MELANIN HYPERPIGMENTATION: ICD-10-CM

## 2023-06-21 DIAGNOSIS — D22 MELANOCYTIC NEVI: ICD-10-CM

## 2023-06-21 DIAGNOSIS — L82.1 OTHER SEBORRHEIC KERATOSIS: ICD-10-CM

## 2023-06-21 DIAGNOSIS — L57.0 ACTINIC KERATOSIS: ICD-10-CM

## 2023-06-21 PROBLEM — D18.01 HEMANGIOMA OF SKIN AND SUBCUTANEOUS TISSUE: Status: ACTIVE | Noted: 2023-06-21

## 2023-06-21 PROBLEM — D22.5 MELANOCYTIC NEVI OF TRUNK: Status: ACTIVE | Noted: 2023-06-21

## 2023-06-21 PROBLEM — L20.84 INTRINSIC (ALLERGIC) ECZEMA: Status: ACTIVE | Noted: 2023-06-21

## 2023-06-21 PROCEDURE — ? TREATMENT REGIMEN

## 2023-06-21 PROCEDURE — ? COUNSELING

## 2023-06-21 PROCEDURE — ? PRESCRIPTION

## 2023-06-21 PROCEDURE — ? LIQUID NITROGEN

## 2023-06-21 PROCEDURE — 17000 DESTRUCT PREMALG LESION: CPT

## 2023-06-21 PROCEDURE — 17003 DESTRUCT PREMALG LES 2-14: CPT

## 2023-06-21 PROCEDURE — 99213 OFFICE O/P EST LOW 20 MIN: CPT | Mod: 25

## 2023-06-21 RX ORDER — TACROLIMUS 1 MG/G
THIN LAYER OINTMENT TOPICAL BID
Qty: 60 | Refills: 0 | Status: ERX

## 2023-06-21 RX ORDER — CLOBETASOL PROPIONATE 0.5 MG/G
THIN LAYER CREAM TOPICAL BID
Qty: 60 | Refills: 1 | Status: ERX

## 2023-06-21 ASSESSMENT — LOCATION ZONE DERM
LOCATION ZONE: FACE
LOCATION ZONE: ARM
LOCATION ZONE: HAND
LOCATION ZONE: TRUNK
LOCATION ZONE: LEG

## 2023-06-21 ASSESSMENT — LOCATION DETAILED DESCRIPTION DERM
LOCATION DETAILED: LEFT MEDIAL UPPER BACK
LOCATION DETAILED: LEFT ULNAR DORSAL HAND
LOCATION DETAILED: EPIGASTRIC SKIN
LOCATION DETAILED: RIGHT DISTAL CALF
LOCATION DETAILED: RIGHT FOREHEAD
LOCATION DETAILED: LEFT DISTAL CALF
LOCATION DETAILED: LEFT RADIAL DORSAL HAND
LOCATION DETAILED: RIGHT LATERAL SHOULDER
LOCATION DETAILED: LEFT POSTERIOR SHOULDER
LOCATION DETAILED: SUPERIOR LUMBAR SPINE

## 2023-06-21 ASSESSMENT — LOCATION SIMPLE DESCRIPTION DERM
LOCATION SIMPLE: RIGHT CALF
LOCATION SIMPLE: RIGHT FOREHEAD
LOCATION SIMPLE: LEFT CALF
LOCATION SIMPLE: LEFT SHOULDER
LOCATION SIMPLE: LEFT HAND
LOCATION SIMPLE: ABDOMEN
LOCATION SIMPLE: LOWER BACK
LOCATION SIMPLE: LEFT UPPER BACK
LOCATION SIMPLE: RIGHT SHOULDER

## 2023-06-21 NOTE — PROCEDURE: TREATMENT REGIMEN
Continue Regimen: Clobetasol cream for 2 weeks alternating with 2 weeks off\\nTacrolimus bid \\nDaily moisturizer
Detail Level: Zone

## 2023-06-21 NOTE — PROCEDURE: LIQUID NITROGEN
Number Of Freeze-Thaw Cycles: 1 freeze-thaw cycle
Show Applicator Variable?: Yes
Render Note In Bullet Format When Appropriate: No
Consent: The patient's consent was obtained including but not limited to risks of crusting, scabbing, blistering, scarring, darker or lighter pigmentary change, recurrence, incomplete removal and infection.
Post-Care Instructions: I reviewed with the patient in detail post-care instructions. Patient is to wear sunprotection, and avoid picking at any of the treated lesions. Pt may apply Vaseline to crusted or scabbing areas.
Duration Of Freeze Thaw-Cycle (Seconds): 10
Detail Level: Detailed

## 2023-07-31 ENCOUNTER — APPOINTMENT (OUTPATIENT)
Dept: VASCULAR LAB | Facility: MEDICAL CENTER | Age: 63
End: 2023-07-31
Attending: NURSE PRACTITIONER

## 2023-07-31 ENCOUNTER — ANTICOAGULATION MONITORING (OUTPATIENT)
Dept: VASCULAR LAB | Facility: MEDICAL CENTER | Age: 63
End: 2023-07-31

## 2023-07-31 VITALS — SYSTOLIC BLOOD PRESSURE: 132 MMHG | DIASTOLIC BLOOD PRESSURE: 71 MMHG | HEART RATE: 61 BPM | OXYGEN SATURATION: 96 %

## 2023-07-31 DIAGNOSIS — I82.409 DEEP VEIN THROMBOSIS (DVT) OF LOWER EXTREMITY, UNSPECIFIED CHRONICITY, UNSPECIFIED LATERALITY, UNSPECIFIED VEIN (HCC): ICD-10-CM

## 2023-07-31 LAB — INR PPP: 2.8 (ref 2–3.5)

## 2023-07-31 PROCEDURE — 99213 OFFICE O/P EST LOW 20 MIN: CPT | Mod: 95 | Performed by: NURSE PRACTITIONER

## 2023-07-31 PROCEDURE — 3078F DIAST BP <80 MM HG: CPT | Performed by: NURSE PRACTITIONER

## 2023-07-31 PROCEDURE — 3075F SYST BP GE 130 - 139MM HG: CPT | Performed by: NURSE PRACTITIONER

## 2023-07-31 NOTE — PROGRESS NOTES
OP Anticoagulation Service Note    Date: 23       Plan: Pt is therapeutic. Denies any s/s of bleeding or clotting. Denies any changes in medications or diet. Will continue warfarin dosing as follows. Follow up appointment in 10 week(s).   Vitals:   Vitals:    23 0953   BP: 132/71   Pulse: 61   SpO2: 96%           This evaluation was conducted via telemedicine visit using secure and encrypted videoconferencing technology.    The patient was physically located at [facility] in [place], [place]. The patient was presented by [facility] [place].  The patient's identity was confirmed and verbal consent for the telemedicine encounter was obtained.     Anticoagulation Summary  As of 2023      INR goal:  2.0-3.0   TTR:  78.5 % (1 y)   INR used for dosin.80 (2023)   Warfarin maintenance plan:  7.5 mg (5 mg x 1.5) every Mon, Fri; 5 mg (5 mg x 1) all other days   Weekly warfarin total:  40 mg   Plan last modified:  AMBAR SalasNYohan (2023)   Next INR check:  10/9/2023   Target end date:  3/12/2023    Indications    Acute pulmonary embolism (HCC) (Resolved) [I26.99]  Deep vein thrombosis (HCC) [I82.409]                 Anticoagulation Episode Summary       INR check location:      Preferred lab:      Send INR reminders to:      Comments:  9 months of therapy then re-eval with pt's PCP, Dr Laurent Moser  Possible Eliquis failure              Review of Systems   HENT: Negative for nosebleeds.   Respiratory: Negative for shortness of breath.  Gastrointestinal: Negative for blood in stool.   Genitourinary: Negative for hematuria.   Psychiatric/Behavioral: The patient is not nervous/anxious.     Physical Exam   Constitutional: Oriented to person, place, and time. Appears well-developed and well-nourished.   Pulmonary/Chest: Effort normal. No respiratory distress.   Skin: Not diaphoretic.  Psychiatric: Normal mood and affect. Behavior is  normal.      LOKI Snyder  [place] [facility]

## 2023-09-07 ENCOUNTER — APPOINTMENT (RX ONLY)
Dept: URBAN - METROPOLITAN AREA CLINIC 35 | Facility: CLINIC | Age: 63
Setting detail: DERMATOLOGY
End: 2023-09-07

## 2023-09-07 DIAGNOSIS — L20.9 ATOPIC DERMATITIS, UNSPECIFIED: ICD-10-CM

## 2023-09-07 PROCEDURE — 99213 OFFICE O/P EST LOW 20 MIN: CPT

## 2023-09-07 PROCEDURE — ? PRESCRIPTION

## 2023-09-07 PROCEDURE — ? TREATMENT REGIMEN

## 2023-09-07 PROCEDURE — ? COUNSELING

## 2023-09-07 RX ORDER — ALCLOMETASONE DIPROPIONATE 0.5 MG/G
THIN LAYER CREAM TOPICAL BID
Qty: 60 | Refills: 0 | Status: ERX | COMMUNITY
Start: 2023-09-07

## 2023-09-07 RX ADMIN — ALCLOMETASONE DIPROPIONATE THIN LAYER: 0.5 CREAM TOPICAL at 00:00

## 2023-09-07 ASSESSMENT — LOCATION SIMPLE DESCRIPTION DERM
LOCATION SIMPLE: LEFT CHEEK
LOCATION SIMPLE: RIGHT CHEEK

## 2023-09-07 ASSESSMENT — LOCATION ZONE DERM: LOCATION ZONE: FACE

## 2023-09-07 ASSESSMENT — LOCATION DETAILED DESCRIPTION DERM
LOCATION DETAILED: LEFT CENTRAL MALAR CHEEK
LOCATION DETAILED: RIGHT CENTRAL MALAR CHEEK

## 2023-09-07 NOTE — HPI: RASH
How Severe Is Your Rash?: moderate
Is This A New Presentation, Or A Follow-Up?: Rash
Additional History: He has history of asthma and hay fever.

## 2023-09-07 NOTE — PROCEDURE: TREATMENT REGIMEN
Show [place] Line: Yes
Plan: Stop all otc products\\n\\nAirborne contact is on the differential.  He may need a referral to allergist.
Action 3: Continue
Detail Level: Zone
Initiate Regimen: Alclometasone bid for 2 weeks alternating with 2 weeks off\\nOpzelura samples to use around eyes

## 2023-10-09 ENCOUNTER — ANTICOAGULATION MONITORING (OUTPATIENT)
Dept: VASCULAR LAB | Facility: MEDICAL CENTER | Age: 63
End: 2023-10-09

## 2023-10-09 VITALS — SYSTOLIC BLOOD PRESSURE: 126 MMHG | HEART RATE: 62 BPM | OXYGEN SATURATION: 95 % | DIASTOLIC BLOOD PRESSURE: 73 MMHG

## 2023-10-09 DIAGNOSIS — I82.409 DEEP VEIN THROMBOSIS (DVT) OF LOWER EXTREMITY, UNSPECIFIED CHRONICITY, UNSPECIFIED LATERALITY, UNSPECIFIED VEIN (HCC): ICD-10-CM

## 2023-10-09 LAB — INR PPP: 2.5 (ref 2–3.5)

## 2023-10-09 PROCEDURE — 3074F SYST BP LT 130 MM HG: CPT | Performed by: NURSE PRACTITIONER

## 2023-10-09 PROCEDURE — 99213 OFFICE O/P EST LOW 20 MIN: CPT | Mod: 95 | Performed by: NURSE PRACTITIONER

## 2023-10-09 PROCEDURE — 3078F DIAST BP <80 MM HG: CPT | Performed by: NURSE PRACTITIONER

## 2023-10-09 NOTE — PROGRESS NOTES
[facility] Telemedicine Anticoagulation Service Note    10/09/23    This evaluation was conducted via telemedicine visit using secure and encrypted videoconferencing technology. The patient was physically located at [facility] [place] in [place], [place]. The patient was presented by [facility].  The patient's identity was confirmed and verbal consent for the telemedicine encounter was obtained.    Anticoagulation Summary  As of 10/9/2023      INR goal:  2.0-3.0   TTR:  81.9 % (1.2 y)   INR used for dosin.50 (10/9/2023)   Warfarin maintenance plan:  7.5 mg (5 mg x 1.5) every Mon, Fri; 5 mg (5 mg x 1) all other days   Weekly warfarin total:  40 mg   Plan last modified:  PAULETTE SalasPYohanNYohan (2023)   Next INR check:  2023   Target end date:  Indefinite    Indications    Acute pulmonary embolism (HCC) (Resolved) [I26.99]  Deep vein thrombosis (HCC) [I82.409]                 Anticoagulation Episode Summary       INR check location:      Preferred lab:      Send INR reminders to:      Comments:  PCP has determined indefinite therapy for unprovoked DVT/PE  Possible Eliquis failure (PE a few days after DVT and starting Eliquis)                Refer to Patient Findings for HPI:  Patient Findings       Positives:  Signs/symptoms of bleeding, Other complaints    Negatives:  Signs/symptoms of thrombosis, Laboratory test error suspected, Change in health, Change in alcohol use, Change in activity, Upcoming invasive procedure, Emergency department visit, Upcoming dental procedure, Missed doses, Extra doses, Change in medications, Change in diet/appetite, Hospital admission, Bruising    Comments:  Has had some nose bleeds when sleeping at night. Longest lasting was 20 minutes. Believes it is related to the dry climate. Also reports some abdominal pain that comes and goes.             Vitals:    10/09/23 0936   BP: 126/73   Pulse: 62   SpO2: 95%       Verified current warfarin dosing schedule.    Pt is not on antiplatelet therapy.    Review of Systems   HENT: Negative for nosebleeds.   Respiratory: Negative for shortness of breath.  Gastrointestinal: Negative for blood in stool.   Genitourinary: Negative for hematuria.   Psychiatric/Behavioral: The patient is not nervous/anxious.     Physical Exam   Constitutional: Oriented to person, place, and time. Appears well-developed and well-nourished.   Pulmonary/Chest: Effort normal. No respiratory distress.   Skin: Not diaphoretic.  Psychiatric: Normal mood and affect. Behavior is normal.    A/P   INR is therapeutic. Suggested a humidifier, saline nasal spray daily and applying vaseline to a qtip to his inner nares to help with epistaxis. He will call our clinic if his bleeding worsens.  He is requesting a follow up u/s of his RLE. Hx of DVT in June 2022. No current swelling or pain. Let him know a f/u scan is not really necessarily if he is asymptomatic but can let us know if he has any chronic thrombus, but will not change the recommended length of therapy. He verbalizes understanding. He wishes to proceed with u/s. Order placed. Will review with pt when I receive results.    Warfarin dosing recommendation: Continue regimen as listed above..    Follow up appointment in 10 week(s).    Next Appointment: Monday, Dec 18th at 0945.     Pt educated to contact our clinic with any changes in medications or s/s of bleeding or thrombosis. Pt is aware to seek immediate medical attention for falls, head injury or deep cuts. Review all of your home medications and newly ordered medications with your doctor and / or pharmacist. Follow medication instructions as directed by your doctor and / or pharmacist. Please keep your medication list with you and share with your physician. Update the information when medications are discontinued, doses are changed, or  new medications (including over-the-counter products) are added; and carry medication information at all times in the event of emergency situations.    CHEST guidelines recommend frequent INR monitoring at regular intervals (a few days up to a max of 12 weeks) to ensure they are on the proper dose of warfarin and not having any complications from therapy.  INRs can dramatically change over a short time period due to diet, medications, and medical conditions.   The patient is instructed to go to the ER for falls with a head injury, blood in urine or stool or any bleeding that last longer than 20 min.   For questions, please contact Outpatient Anticoagulation Service [phone].         PAULA Rivera.  [facility]  [phone]

## 2023-10-19 ENCOUNTER — APPOINTMENT (RX ONLY)
Dept: URBAN - METROPOLITAN AREA CLINIC 35 | Facility: CLINIC | Age: 63
Setting detail: DERMATOLOGY
End: 2023-10-19

## 2023-10-19 DIAGNOSIS — L20.9 ATOPIC DERMATITIS, UNSPECIFIED: ICD-10-CM

## 2023-10-19 PROCEDURE — ? TREATMENT REGIMEN

## 2023-10-19 PROCEDURE — 99213 OFFICE O/P EST LOW 20 MIN: CPT

## 2023-10-19 PROCEDURE — ? COUNSELING

## 2023-10-19 PROCEDURE — ? PRESCRIPTION

## 2023-10-19 RX ORDER — CLOBETASOL PROPIONATE 0.5 MG/G
CREAM TOPICAL BID
Qty: 60 | Refills: 2 | Status: ERX

## 2023-10-19 RX ORDER — ALCLOMETASONE DIPROPIONATE 0.5 MG/G
THIN LAYER CREAM TOPICAL BID
Qty: 60 | Refills: 1 | Status: ERX

## 2023-10-19 ASSESSMENT — LOCATION ZONE DERM
LOCATION ZONE: FACE
LOCATION ZONE: LEG
LOCATION ZONE: HAND

## 2023-10-19 ASSESSMENT — LOCATION SIMPLE DESCRIPTION DERM
LOCATION SIMPLE: LEFT HAND
LOCATION SIMPLE: RIGHT HAND
LOCATION SIMPLE: LEFT CALF
LOCATION SIMPLE: RIGHT CALF
LOCATION SIMPLE: LEFT CHEEK
LOCATION SIMPLE: RIGHT CHEEK

## 2023-10-19 ASSESSMENT — LOCATION DETAILED DESCRIPTION DERM
LOCATION DETAILED: LEFT DISTAL CALF
LOCATION DETAILED: 4TH WEB SPACE RIGHT HAND
LOCATION DETAILED: LEFT DORSAL MIDDLE METACARPOPHALANGEAL JOINT
LOCATION DETAILED: LEFT CENTRAL MALAR CHEEK
LOCATION DETAILED: RIGHT DISTAL CALF
LOCATION DETAILED: RIGHT CENTRAL MALAR CHEEK

## 2023-10-19 NOTE — PROCEDURE: TREATMENT REGIMEN
Show [place] Line: Yes
Plan: This is significantly improved from prior exam.  However, he is not clear.\\nPatient to contact insurance for allergist that is contracted with his insurance.
Action 3: Continue
Detail Level: Zone
Continue Regimen: Alclometasone cream twice daily on face for two weeks on, then two weeks off. Can use Tacrolimus ointment on face during two week break from Aclometasone, if needed.
Initiate Regimen: Clobetasol cream on any other body parts, except face, twice daily, for two weeks on, then two weeks off.\\nTacrolimus ointment on any body part, twice daily for flares.

## 2023-10-20 RX ORDER — TACROLIMUS 1 MG/G
1 OINTMENT TOPICAL BID
Qty: 60 | Refills: 2 | Status: ERX

## 2023-12-14 DIAGNOSIS — I82.409 DEEP VEIN THROMBOSIS (DVT) OF LOWER EXTREMITY, UNSPECIFIED CHRONICITY, UNSPECIFIED LATERALITY, UNSPECIFIED VEIN (HCC): ICD-10-CM

## 2023-12-18 ENCOUNTER — ANTICOAGULATION MONITORING (OUTPATIENT)
Dept: VASCULAR LAB | Facility: MEDICAL CENTER | Age: 63
End: 2023-12-18

## 2023-12-18 VITALS — HEART RATE: 67 BPM | OXYGEN SATURATION: 97 % | SYSTOLIC BLOOD PRESSURE: 140 MMHG | DIASTOLIC BLOOD PRESSURE: 75 MMHG

## 2023-12-18 DIAGNOSIS — I26.99 ACUTE PULMONARY EMBOLISM, UNSPECIFIED PULMONARY EMBOLISM TYPE, UNSPECIFIED WHETHER ACUTE COR PULMONALE PRESENT (HCC): ICD-10-CM

## 2023-12-18 DIAGNOSIS — I82.409 DEEP VEIN THROMBOSIS (DVT) OF LOWER EXTREMITY, UNSPECIFIED CHRONICITY, UNSPECIFIED LATERALITY, UNSPECIFIED VEIN (HCC): ICD-10-CM

## 2023-12-18 LAB — INR PPP: 2.2 (ref 2–3.5)

## 2023-12-18 PROCEDURE — 99213 OFFICE O/P EST LOW 20 MIN: CPT | Performed by: NURSE PRACTITIONER

## 2023-12-18 PROCEDURE — 3078F DIAST BP <80 MM HG: CPT | Performed by: NURSE PRACTITIONER

## 2023-12-18 PROCEDURE — 3077F SYST BP >= 140 MM HG: CPT | Performed by: NURSE PRACTITIONER

## 2023-12-18 RX ORDER — WARFARIN SODIUM 5 MG/1
TABLET ORAL
Qty: 135 TABLET | Refills: 1 | Status: SHIPPED | OUTPATIENT
Start: 2023-12-18

## 2023-12-18 NOTE — PROGRESS NOTES
[facility] Telemedicine Anticoagulation Service Note    23    This evaluation was conducted via telemedicine visit using secure and encrypted videoconferencing technology. The patient was physically located at [facility] in [place], [place]. The patient was presented by [facility] MELVIN Bradshaw . The patient's identity was confirmed and verbal consent for the telemedicine encounter was obtained.    Anticoagulation Summary  As of 2023      INR goal:  2.0-3.0   TTR:  84.4 % (1.4 y)   INR used for dosin.20 (2023)   Warfarin maintenance plan:  7.5 mg (5 mg x 1.5) every Mon, Fri; 5 mg (5 mg x 1) all other days   Weekly warfarin total:  40 mg   Plan last modified:  AMBAR SalasNYohan (2023)   Next INR check:  3/4/2024   Target end date:  Indefinite    Indications    Acute pulmonary embolism (HCC) (Resolved) [I26.99]  Deep vein thrombosis (HCC) [I82.409]                 Anticoagulation Episode Summary       INR check location:      Preferred lab:      Send INR reminders to:      Comments:  PCP has determined indefinite therapy for unprovoked DVT/PE  Possible Eliquis failure (PE a few days after DVT and starting Eliquis)                Refer to Patient Findings for HPI:      Vitals:    23 0952   BP: (!) 140/75   Pulse: 67   SpO2: 97%       Verified current warfarin dosing schedule.    Pt is not on antiplatelet therapy.    Review of Systems   HENT: Negative for nosebleeds.   Respiratory: Negative for shortness of breath.  Gastrointestinal: Negative for blood in stool.   Genitourinary: Negative for hematuria.   Psychiatric/Behavioral: The patient is not nervous/anxious.     Physical Exam   Constitutional: Oriented to person, place, and time. Appears well-developed and well-nourished.   Pulmonary/Chest: Effort normal. No respiratory distress.    Skin: Not diaphoretic.  Psychiatric: Normal mood and affect. Behavior is normal.    A/P   INR is therapeutic. His f/u u/s done recently showed chronic popliteal DVT. He occasionally has rt leg aches but no ongoing pain. Overall, is stable on warfarin. Will continue his current regimen.    Warfarin dosing recommendation: Continue taking 7.5 mg M/F, 5 mg AODs.    Follow up appointment in 11 week(s).    Next Appointment: Monday, March 4, 2024 at 9:30 am.     Pt educated to contact our clinic with any changes in medications or s/s of bleeding or thrombosis. Pt is aware to seek immediate medical attention for falls, head injury or deep cuts. Review all of your home medications and newly ordered medications with your doctor and / or pharmacist. Follow medication instructions as directed by your doctor and / or pharmacist. Please keep your medication list with you and share with your physician. Update the information when medications are discontinued, doses are changed, or new medications (including over-the-counter products) are added; and carry medication information at all times in the event of emergency situations.    CHEST guidelines recommend frequent INR monitoring at regular intervals (a few days up to a max of 12 weeks) to ensure they are on the proper dose of warfarin and not having any complications from therapy.  INRs can dramatically change over a short time period due to diet, medications, and medical conditions.   The patient is instructed to go to the ER for falls with a head injury, blood in urine or stool or any bleeding that last longer than 20 min.   For questions, please contact Outpatient Anticoagulation Service [phone].         PAULA Rivera.  [facility]  [phone]

## 2024-01-10 ENCOUNTER — APPOINTMENT (RX ONLY)
Dept: URBAN - METROPOLITAN AREA CLINIC 35 | Facility: CLINIC | Age: 64
Setting detail: DERMATOLOGY
End: 2024-01-10

## 2024-01-10 DIAGNOSIS — B02.9 ZOSTER WITHOUT COMPLICATIONS: ICD-10-CM

## 2024-01-10 DIAGNOSIS — L20.9 ATOPIC DERMATITIS, UNSPECIFIED: ICD-10-CM | Status: INADEQUATELY CONTROLLED

## 2024-01-10 PROCEDURE — ? COUNSELING

## 2024-01-10 PROCEDURE — ? PRESCRIPTION

## 2024-01-10 PROCEDURE — ? TREATMENT REGIMEN

## 2024-01-10 PROCEDURE — 99213 OFFICE O/P EST LOW 20 MIN: CPT

## 2024-01-10 RX ORDER — CLOBETASOL PROPIONATE 0.5 MG/G
1 CREAM TOPICAL BID
Qty: 60 | Refills: 2 | Status: ERX

## 2024-01-10 RX ORDER — TACROLIMUS 1 MG/G
1 OINTMENT TOPICAL BID
Qty: 60 | Refills: 2 | Status: ERX

## 2024-01-10 RX ORDER — ALCLOMETASONE DIPROPIONATE 0.5 MG/G
THIN LAYER CREAM TOPICAL BID
Qty: 60 | Refills: 1 | Status: ERX

## 2024-01-10 RX ORDER — LIDOCAINE 50 MG/G
1 OINTMENT TOPICAL QID
Qty: 35.44 | Refills: 2 | Status: ERX

## 2024-01-10 RX ORDER — VALACYCLOVIR 1 G/1
1 TABLET, FILM COATED ORAL TID
Qty: 21 | Refills: 0 | Status: ERX

## 2024-01-10 ASSESSMENT — LOCATION SIMPLE DESCRIPTION DERM
LOCATION SIMPLE: RIGHT CALF
LOCATION SIMPLE: RIGHT UPPER BACK
LOCATION SIMPLE: T4 RIGHT POSTERIOR DERMATOME
LOCATION SIMPLE: ABDOMEN
LOCATION SIMPLE: LEFT CALF
LOCATION SIMPLE: LEFT HAND
LOCATION SIMPLE: T5 RIGHT ANTERIOR DERMATOME

## 2024-01-10 ASSESSMENT — LOCATION DETAILED DESCRIPTION DERM
LOCATION DETAILED: RIGHT DISTAL CALF
LOCATION DETAILED: LEFT DISTAL CALF
LOCATION DETAILED: T5 RIGHT ANTERIOR DERMATOME
LOCATION DETAILED: LEFT DORSAL MIDDLE METACARPOPHALANGEAL JOINT
LOCATION DETAILED: T4 RIGHT POSTERIOR DERMATOME
LOCATION DETAILED: RIGHT INFERIOR LATERAL UPPER BACK
LOCATION DETAILED: RIGHT RIB CAGE

## 2024-01-10 ASSESSMENT — LOCATION ZONE DERM
LOCATION ZONE: LEG
LOCATION ZONE: HAND
LOCATION ZONE: TRUNK

## 2024-01-10 NOTE — PROCEDURE: TREATMENT REGIMEN
Show [place] Line: Yes
Plan: This is significantly improved from prior exam.  However, he is not clear.\\nReferral to [facility] will be sent.
Action 3: Continue
Detail Level: Zone
Continue Regimen: Alclometasone cream twice daily on face for two weeks on, then two weeks off. Can use Tacrolimus ointment on face during two week break from Aclometasone, if needed.\\n\\nClobetasol cream on any other body parts, except face, twice daily, for two weeks on, then two weeks off.\\n\\nTacrolimus ointment on any body part, twice daily for flares.
Initiate Treatment: Valacyclovir TID for 7 days. \\nApply lidocaine gel to Shingles every 6 hours PRN for pain

## 2024-01-11 ENCOUNTER — APPOINTMENT (RX ONLY)
Dept: URBAN - METROPOLITAN AREA CLINIC 35 | Facility: CLINIC | Age: 64
Setting detail: DERMATOLOGY
End: 2024-01-11

## 2024-01-11 DIAGNOSIS — L20.9 ATOPIC DERMATITIS, UNSPECIFIED: ICD-10-CM

## 2024-01-11 PROCEDURE — ? REFERRAL

## 2024-01-11 PROCEDURE — ? COUNSELING

## 2024-01-11 PROCEDURE — ? PRESCRIPTION

## 2024-01-11 PROCEDURE — ? TREATMENT REGIMEN

## 2024-01-11 RX ORDER — CLOBETASOL PROPIONATE 0.5 MG/G
1 CREAM TOPICAL BID
Qty: 60 | Refills: 2 | Status: ACTIVE

## 2024-01-11 RX ORDER — TACROLIMUS 1 MG/G
1 OINTMENT TOPICAL BID
Qty: 60 | Refills: 2 | Status: ACTIVE

## 2024-01-11 RX ORDER — ALCLOMETASONE DIPROPIONATE 0.5 MG/G
THIN LAYER CREAM TOPICAL BID
Qty: 60 | Refills: 1 | Status: ACTIVE

## 2024-01-11 ASSESSMENT — LOCATION DETAILED DESCRIPTION DERM
LOCATION DETAILED: LEFT DORSAL MIDDLE METACARPOPHALANGEAL JOINT
LOCATION DETAILED: RIGHT DISTAL CALF
LOCATION DETAILED: LEFT DISTAL CALF

## 2024-01-11 ASSESSMENT — LOCATION ZONE DERM
LOCATION ZONE: LEG
LOCATION ZONE: HAND

## 2024-01-11 ASSESSMENT — LOCATION SIMPLE DESCRIPTION DERM
LOCATION SIMPLE: LEFT CALF
LOCATION SIMPLE: RIGHT CALF
LOCATION SIMPLE: LEFT HAND

## 2024-01-11 NOTE — PROCEDURE: TREATMENT REGIMEN
Show [place] Line: Yes
Plan: This is significantly improved from prior exam.  However, he is not clear.\\nReferral to [facility] will be sent.
Action 3: Continue
Detail Level: Zone
Continue Regimen: Alclometasone cream twice daily on face for two weeks on, then two weeks off. Can use Tacrolimus ointment on face during two week break from Aclometasone, if needed.\\n\\nClobetasol cream on any other body parts, except face, twice daily, for two weeks on, then two weeks off.\\n\\nTacrolimus ointment on any body part, twice daily for flares.

## 2024-01-22 DIAGNOSIS — I82.409 DEEP VEIN THROMBOSIS (DVT) OF LOWER EXTREMITY, UNSPECIFIED CHRONICITY, UNSPECIFIED LATERALITY, UNSPECIFIED VEIN (HCC): ICD-10-CM

## 2024-03-04 ENCOUNTER — ANTICOAGULATION MONITORING (OUTPATIENT)
Dept: VASCULAR LAB | Facility: MEDICAL CENTER | Age: 64
End: 2024-03-04

## 2024-03-04 VITALS — HEART RATE: 60 BPM | DIASTOLIC BLOOD PRESSURE: 75 MMHG | OXYGEN SATURATION: 97 % | SYSTOLIC BLOOD PRESSURE: 142 MMHG

## 2024-03-04 DIAGNOSIS — I82.409 DEEP VEIN THROMBOSIS (DVT) OF LOWER EXTREMITY, UNSPECIFIED CHRONICITY, UNSPECIFIED LATERALITY, UNSPECIFIED VEIN (HCC): ICD-10-CM

## 2024-03-04 LAB — INR PPP: 2.7 (ref 2–3.5)

## 2024-03-04 PROCEDURE — 99214 OFFICE O/P EST MOD 30 MIN: CPT | Mod: 95 | Performed by: NURSE PRACTITIONER

## 2024-03-04 PROCEDURE — 3077F SYST BP >= 140 MM HG: CPT | Mod: 95 | Performed by: NURSE PRACTITIONER

## 2024-03-04 PROCEDURE — 3078F DIAST BP <80 MM HG: CPT | Mod: 95 | Performed by: NURSE PRACTITIONER

## 2024-03-04 RX ORDER — GLIMEPIRIDE 2 MG/1
2 TABLET ORAL EVERY MORNING
COMMUNITY

## 2024-03-04 RX ORDER — EPLERENONE 25 MG/1
25 TABLET, FILM COATED ORAL
COMMUNITY

## 2024-03-04 NOTE — PROGRESS NOTES
[facility] Telemedicine Anticoagulation Service Note    24    This evaluation was conducted via telemedicine visit using secure and encrypted videoconferencing technology. The patient was physically located at  in [place], [place]. The patient was presented by [facility] MELVIN Polo. The patient's identity was confirmed and verbal consent for the telemedicine encounter was obtained.    Anticoagulation Summary  As of 3/4/2024      INR goal:  2.0-3.0   TTR:  86.4% (1.6 y)   INR used for dosin.70 (3/4/2024)   Warfarin maintenance plan:  7.5 mg (5 mg x 1.5) every Mon, Fri; 5 mg (5 mg x 1) all other days   Weekly warfarin total:  40 mg   Plan last modified:  AMBAR SalasNYohan (2023)   Next INR check:  2024   Target end date:  Indefinite    Indications    Acute pulmonary embolism (HCC) (Resolved) [I26.99]  Deep vein thrombosis (HCC) [I82.409]                 Anticoagulation Episode Summary       INR check location:      Preferred lab:      Send INR reminders to:      Comments:  PCP has determined indefinite therapy for unprovoked DVT/PE  Possible Eliquis failure (PE a few days after DVT and starting Eliquis)                Refer to Patient Findings for HPI:  Patient Findings       Positives:  Change in medications    Negatives:  Signs/symptoms of thrombosis, Signs/symptoms of bleeding, Laboratory test error suspected, Change in health, Change in alcohol use, Change in activity, Upcoming invasive procedure, Emergency department visit, Upcoming dental procedure, Missed doses, Extra doses, Change in diet/appetite, Hospital admission, Bruising, Other complaints    Comments:  No longer taking spironolactone or carvedilol. Now on eplerenone and glimepiride. Med rec updated.            Vitals:    24 0920   BP: (!) 142/75    Pulse: 60   SpO2: 97%       Verified current warfarin dosing schedule.    Pt is not on antiplatelet therapy.    Review of Systems   HENT: Negative for nosebleeds.   Respiratory: Negative for shortness of breath.  Gastrointestinal: Negative for blood in stool.   Genitourinary: Negative for hematuria.   Psychiatric/Behavioral: The patient is not nervous/anxious.     Physical Exam   Constitutional: Oriented to person, place, and time. Appears well-developed and well-nourished.   Pulmonary/Chest: Effort normal. No respiratory distress.   Skin: Not diaphoretic.  Psychiatric: Normal mood and affect. Behavior is normal.    A/P   INR is therapeutic.    Warfarin dosing recommendation: Continue 7.5 mg Mon/Fri, 5 mg AODs.    Follow up appointment in 11 week(s).    Next Appointment: Monday, May 20, 2024 at 9:30 am.     Pt educated to contact our clinic with any changes in medications or s/s of bleeding or thrombosis. Pt is aware to seek immediate medical attention for falls, head injury or deep cuts. Review all of your home medications and newly ordered medications with your doctor and / or pharmacist. Follow medication instructions as directed by your doctor and / or pharmacist. Please keep your medication list with you and share with your physician. Update the information when medications are discontinued, doses are changed, or new medications (including over-the-counter products) are added; and carry medication information at all times in the event of emergency situations.    CHEST guidelines recommend frequent INR monitoring at regular intervals (a few days up to a max of 12 weeks) to ensure they are on the proper dose of warfarin and not having any complications from therapy.  INRs can dramatically change over a short time period due to diet, medications, and medical conditions.   The patient is instructed to go to the ER for falls with a head injury, blood in urine or stool or any bleeding that last longer than 20 min.    For questions, please contact Outpatient Anticoagulation Service [phone].         PAULA Rivera.  [facility]  [phone]

## 2024-04-26 ENCOUNTER — APPOINTMENT (RX ONLY)
Dept: URBAN - METROPOLITAN AREA CLINIC 15 | Facility: CLINIC | Age: 64
Setting detail: DERMATOLOGY
End: 2024-04-26

## 2024-04-26 DIAGNOSIS — B35.4 TINEA CORPORIS: ICD-10-CM

## 2024-04-26 DIAGNOSIS — L20.9 ATOPIC DERMATITIS, UNSPECIFIED: ICD-10-CM | Status: INADEQUATELY CONTROLLED

## 2024-04-26 PROCEDURE — ? KOH PREP

## 2024-04-26 PROCEDURE — ? MEDICATION COUNSELING

## 2024-04-26 PROCEDURE — ? PHOTO-DOCUMENTATION

## 2024-04-26 PROCEDURE — ? PRESCRIPTION

## 2024-04-26 PROCEDURE — ? PRESCRIPTION MEDICATION MANAGEMENT

## 2024-04-26 PROCEDURE — ? COUNSELING

## 2024-04-26 PROCEDURE — ? ADDITIONAL NOTES

## 2024-04-26 PROCEDURE — 99214 OFFICE O/P EST MOD 30 MIN: CPT

## 2024-04-26 PROCEDURE — ? TREATMENT REGIMEN

## 2024-04-26 RX ORDER — KETOCONAZOLE 20 MG/G
CREAM TOPICAL
Qty: 60 | Refills: 2 | Status: ERX | COMMUNITY
Start: 2024-04-26

## 2024-04-26 RX ADMIN — KETOCONAZOLE THIN LAYER: 20 CREAM TOPICAL at 00:00

## 2024-04-26 ASSESSMENT — LOCATION ZONE DERM
LOCATION ZONE: TRUNK
LOCATION ZONE: LEG

## 2024-04-26 ASSESSMENT — LOCATION DETAILED DESCRIPTION DERM
LOCATION DETAILED: LEFT ANTERIOR PROXIMAL THIGH
LOCATION DETAILED: RIGHT ANTERIOR PROXIMAL THIGH
LOCATION DETAILED: LEFT MEDIAL PROXIMAL PRETIBIAL REGION
LOCATION DETAILED: SUPRAPUBIC SKIN

## 2024-04-26 ASSESSMENT — LOCATION SIMPLE DESCRIPTION DERM
LOCATION SIMPLE: RIGHT THIGH
LOCATION SIMPLE: GROIN
LOCATION SIMPLE: LEFT PRETIBIAL REGION
LOCATION SIMPLE: LEFT THIGH

## 2024-04-26 NOTE — PROCEDURE: KOH PREP
Patient will be able to perform functional transfers, using least restrictive device, independently within 4 weeks.
Detail Level: Detailed
Cpt Desired: 
Showing: fungal hyphal elements: positive
Koh Intro Text (From The.....): A KOH prep was ordered and evaluated from the
Koh Procedure Text (Tissue Harvesting Technique): A 15-blade scalpel was used to scrape the skin. The skin scrapings were placed on a glass slide, covered with a coverslip and a KOH solution was applied.

## 2024-04-26 NOTE — PROCEDURE: TREATMENT REGIMEN
Detail Level: Zone
Plan: Pt was advised to be aware of washing with clean towels.\\nquite diffuse , may need po but trying to avoid w/ warfarin interaction if possible.
Initiate Treatment: ketoconazole 2 % topical cream \\nSig: Apply twice a day until rash is resolved
Discontinue Regimen: Stop the steroid creams (clobetasol and alcometasone).

## 2024-04-26 NOTE — PROCEDURE: ADDITIONAL NOTES
Render Risk Assessment In Note?: no
Additional Notes: he has been previously dx w/ atopic derm by his derm dr archie. i dont see a ton of active AD today but he reports its usually on his lower legs & hands. he inquired about alternative treatments. we hda brief discussion abotu dupixent.
Detail Level: Simple

## 2024-04-26 NOTE — PROCEDURE: MEDICATION COUNSELING
Vtama Pregnancy And Lactation Text: It is unknown if this medication can cause problems during pregnancy and breastfeeding.
Skyrizi Pregnancy And Lactation Text: The risk during pregnancy and breastfeeding is uncertain with this medication.
Quinolones Pregnancy And Lactation Text: This medication is Pregnancy Category C and it isn't know if it is safe during pregnancy. It is also excreted in breast milk.
Xeljanz Counseling: I discussed with the patient the risks of Xeljanz therapy including increased risk of infection, liver issues, headache, diarrhea, or cold symptoms. Live vaccines should be avoided. They were instructed to call if they have any problems.
High Dose Vitamin A Pregnancy And Lactation Text: High dose vitamin A therapy is contraindicated during pregnancy and breast feeding.
Protopic Pregnancy And Lactation Text: This medication is Pregnancy Category C. It is unknown if this medication is excreted in breast milk when applied topically.
Cosentyx Counseling:  I discussed with the patient the risks of Cosentyx including but not limited to worsening of Crohn's disease, immunosuppression, allergic reactions and infections.  The patient understands that monitoring is required including a PPD at baseline and must alert us or the primary physician if symptoms of infection or other concerning signs are noted.
Cyclophosphamide Counseling:  I discussed with the patient the risks of cyclophosphamide including but not limited to hair loss, hormonal abnormalities, decreased fertility, abdominal pain, diarrhea, nausea and vomiting, bone marrow suppression and infection. The patient understands that monitoring is required while taking this medication.
Carac Pregnancy And Lactation Text: This medication is Pregnancy Category X and contraindicated in pregnancy and in women who may become pregnant. It is unknown if this medication is excreted in breast milk.
Elidel Counseling: Patient may experience a mild burning sensation during topical application. Elidel is not approved in children less than 2 years of age. There have been case reports of hematologic and skin malignancies in patients using topical calcineurin inhibitors although causality is questionable.
Finasteride Pregnancy And Lactation Text: This medication is absolutely contraindicated during pregnancy. It is unknown if it is excreted in breast milk.
Cibinqo Pregnancy And Lactation Text: It is unknown if this medication will adversely affect pregnancy or breast feeding.  You should not take this medication if you are currently pregnant or planning a pregnancy or while breastfeeding.
Terbinafine Pregnancy And Lactation Text: This medication is Pregnancy Category B and is considered safe during pregnancy. It is also excreted in breast milk and breast feeding isn't recommended.
Topical Steroids Counseling: I discussed with the patient that prolonged use of topical steroids can result in the increased appearance of superficial blood vessels (telangiectasias), lightening (hypopigmentation) and thinning of the skin (atrophy).  Patient understands to avoid using high potency steroids in skin folds, the groin or the face.  The patient verbalized understanding of the proper use and possible adverse effects of topical steroids.  All of the patient's questions and concerns were addressed.
Thalidomide Counseling: I discussed with the patient the risks of thalidomide including but not limited to birth defects, anxiety, weakness, chest pain, dizziness, cough and severe allergy.
Erivedge Counseling- I discussed with the patient the risks of Erivedge including but not limited to nausea, vomiting, diarrhea, constipation, weight loss, changes in the sense of taste, decreased appetite, muscle spasms, and hair loss.  The patient verbalized understanding of the proper use and possible adverse effects of Erivedge.  All of the patient's questions and concerns were addressed.
Ilumya Counseling: I discussed with the patient the risks of tildrakizumab including but not limited to immunosuppression, malignancy, posterior leukoencephalopathy syndrome, and serious infections.  The patient understands that monitoring is required including a PPD at baseline and must alert us or the primary physician if symptoms of infection or other concerning signs are noted.
Fluconazole Counseling:  Patient counseled regarding adverse effects of fluconazole including but not limited to headache, diarrhea, nausea, upset stomach, liver function test abnormalities, taste disturbance, and stomach pain.  There is a rare possibility of liver failure that can occur when taking fluconazole.  The patient understands that monitoring of LFTs and kidney function test may be required, especially at baseline. The patient verbalized understanding of the proper use and possible adverse effects of fluconazole.  All of the patient's questions and concerns were addressed.
Clofazimine Counseling:  I discussed with the patient the risks of clofazimine including but not limited to skin and eye pigmentation, liver damage, nausea/vomiting, gastrointestinal bleeding and allergy.
Topical Retinoid Pregnancy And Lactation Text: This medication is Pregnancy Category C. It is unknown if this medication is excreted in breast milk.
Doxycycline Counseling:  Patient counseled regarding possible photosensitivity and increased risk for sunburn.  Patient instructed to avoid sunlight, if possible.  When exposed to sunlight, patients should wear protective clothing, sunglasses, and sunscreen.  The patient was instructed to call the office immediately if the following severe adverse effects occur:  hearing changes, easy bruising/bleeding, severe headache, or vision changes.  The patient verbalized understanding of the proper use and possible adverse effects of doxycycline.  All of the patient's questions and concerns were addressed.
Oral Minoxidil Pregnancy And Lactation Text: This medication should only be used when clearly needed if you are pregnant, attempting to become pregnant or breast feeding.
Minoxidil Counseling: Minoxidil is a topical medication which can increase blood flow where it is applied. It is uncertain how this medication increases hair growth. Side effects are uncommon and include stinging and allergic reactions.
Xelawaisz Pregnancy And Lactation Text: This medication is Pregnancy Category D and is not considered safe during pregnancy.  The risk during breast feeding is also uncertain.
Ivermectin Counseling:  Patient instructed to take medication on an empty stomach with a full glass of water.  Patient informed of potential adverse effects including but not limited to nausea, diarrhea, dizziness, itching, and swelling of the extremities or lymph nodes.  The patient verbalized understanding of the proper use and possible adverse effects of ivermectin.  All of the patient's questions and concerns were addressed.
Birth Control Pills Counseling: Birth Control Pill Counseling: I discussed with the patient the potential side effects of OCPs including but not limited to increased risk of stroke, heart attack, thrombophlebitis, deep venous thrombosis, hepatic adenomas, breast changes, GI upset, headaches, and depression.  The patient verbalized understanding of the proper use and possible adverse effects of OCPs. All of the patient's questions and concerns were addressed.
Libtayo Pregnancy And Lactation Text: This medication is contraindicated in pregnancy and when breast feeding.
Calcipotriene Counseling:  I discussed with the patient the risks of calcipotriene including but not limited to erythema, scaling, itching, and irritation.
Thalidomide Pregnancy And Lactation Text: This medication is Pregnancy Category X and is absolutely contraindicated during pregnancy. It is unknown if it is excreted in breast milk.
Low Dose Naltrexone Counseling- I discussed with the patient the potential risks and side effects of low dose naltrexone including but not limited to: more vivid dreams, headaches, nausea, vomiting, abdominal pain, fatigue, dizziness, and anxiety.
Qbrexza Counseling:  I discussed with the patient the risks of Qbrexza including but not limited to headache, mydriasis, blurred vision, dry eyes, nasal dryness, dry mouth, dry throat, dry skin, urinary hesitation, and constipation.  Local skin reactions including erythema, burning, stinging, and itching can also occur.
Cosentyx Pregnancy And Lactation Text: This medication is Pregnancy Category B and is considered safe during pregnancy. It is unknown if this medication is excreted in breast milk.
Rifampin Counseling: I discussed with the patient the risks of rifampin including but not limited to liver damage, kidney damage, red-orange body fluids, nausea/vomiting and severe allergy.
Cyclophosphamide Pregnancy And Lactation Text: This medication is Pregnancy Category D and it isn't considered safe during pregnancy. This medication is excreted in breast milk.
Zoryve Counseling:  I discussed with the patient that Zoryve is not for use in the eyes, mouth or vagina. The most commonly reported side effects include diarrhea, headache, insomnia, application site pain, upper respiratory tract infections, and urinary tract infections.  All of the patient's questions and concerns were addressed.
Stelara Counseling:  I discussed with the patient the risks of ustekinumab including but not limited to immunosuppression, malignancy, posterior leukoencephalopathy syndrome, and serious infections.  The patient understands that monitoring is required including a PPD at baseline and must alert us or the primary physician if symptoms of infection or other concerning signs are noted.
Minoxidil Pregnancy And Lactation Text: This medication has not been assigned a Pregnancy Risk Category but animal studies failed to show danger with the topical medication. It is unknown if the medication is excreted in breast milk.
Topical Steroids Applications Pregnancy And Lactation Text: Most topical steroids are considered safe to use during pregnancy and lactation.  Any topical steroid applied to the breast or nipple should be washed off before breastfeeding.
Otezla Counseling: The side effects of Otezla were discussed with the patient, including but not limited to worsening or new depression, weight loss, diarrhea, nausea, upper respiratory tract infection, and headache. Patient instructed to call the office should any adverse effect occur.  The patient verbalized understanding of the proper use and possible adverse effects of Otezla.  All the patient's questions and concerns were addressed.
Calcipotriene Pregnancy And Lactation Text: The use of this medication during pregnancy or lactation is not recommended as there is insufficient data.
Litfulo Counseling: I discussed with the patient the risks of Litfulo therapy including but not limited to upper respiratory tract infections, shingles, cold sores, and nausea. Live vaccines should be avoided.  This medication has been linked to serious infections; higher rate of mortality; malignancy and lymphoproliferative disorders; major adverse cardiovascular events; thrombosis; gastrointestinal perforations; neutropenia; lymphopenia; anemia; liver enzyme elevations; and lipid elevations.
Doxycycline Pregnancy And Lactation Text: This medication is Pregnancy Category D and not consider safe during pregnancy. It is also excreted in breast milk but is considered safe for shorter treatment courses.
Azithromycin Counseling:  I discussed with the patient the risks of azithromycin including but not limited to GI upset, allergic reaction, drug rash, diarrhea, and yeast infections.
Cantharidin Counseling:  I discussed with the patient the risks of Cantharidin including but not limited to pain, redness, burning, itching, and blistering.
Cimetidine Counseling:  I discussed with the patient the risks of Cimetidine including but not limited to gynecomastia, headache, diarrhea, nausea, drowsiness, arrhythmias, pancreatitis, skin rashes, psychosis, bone marrow suppression and kidney toxicity.
Tazorac Counseling:  Patient advised that medication is irritating and drying.  Patient may need to apply sparingly and wash off after an hour before eventually leaving it on overnight.  The patient verbalized understanding of the proper use and possible adverse effects of tazorac.  All of the patient's questions and concerns were addressed.
Clofazimine Pregnancy And Lactation Text: This medication is Pregnancy Category C and isn't considered safe during pregnancy. It is excreted in breast milk.
Low Dose Naltrexone Pregnancy And Lactation Text: Naltrexone is pregnancy category C.  There have been no adequate and well-controlled studies in pregnant women.  It should be used in pregnancy only if the potential benefit justifies the potential risk to the fetus.   Limited data indicates that naltrexone is minimally excreted into breastmilk.
Birth Control Pills Pregnancy And Lactation Text: This medication should be avoided if pregnant and for the first 30 days post-partum.
Qbrexza Pregnancy And Lactation Text: There is no available data on Qbrexza use in pregnant women.  There is no available data on Qbrexza use in lactation.
Odomzo Counseling- I discussed with the patient the risks of Odomzo including but not limited to nausea, vomiting, diarrhea, constipation, weight loss, changes in the sense of taste, decreased appetite, muscle spasms, and hair loss.  The patient verbalized understanding of the proper use and possible adverse effects of Odomzo.  All of the patient's questions and concerns were addressed.
Ivermectin Pregnancy And Lactation Text: This medication is Pregnancy Category C and it isn't known if it is safe during pregnancy. It is also excreted in breast milk.
Eucrisa Counseling: Patient may experience a mild burning sensation during topical application. Eucrisa is not approved in children less than 3 months of age.
Dupixent Counseling: I discussed with the patient the risks of dupilumab including but not limited to eye inflammation and irritation, cold sores, injection site reactions, allergic reactions and increased risk of parasitic infection. The patient understands that monitoring is required and they must alert us or the primary physician if symptoms of infection or other concerning signs are noted.
Rituxan Counseling:  I discussed with the patient the risks of Rituxan infusions. Side effects can include infusion reactions, severe drug rashes including mucocutaneous reactions, reactivation of latent hepatitis and other infections and rarely progressive multifocal leukoencephalopathy.  All of the patient's questions and concerns were addressed.
Topical Sulfur Applications Counseling: Topical Sulfur Counseling: Patient counseled that this medication may cause skin irritation or allergic reactions.  In the event of skin irritation, the patient was advised to reduce the amount of the drug applied or use it less frequently.   The patient verbalized understanding of the proper use and possible adverse effects of topical sulfur application.  All of the patient's questions and concerns were addressed.
Litfulo Pregnancy And Lactation Text: Based on animal studies, Lifulo may cause embryo-fetal harm when administered to pregnant women.  The medication should not be used in pregnancy.  Breastfeeding is not recommended during treatment.
Tranexamic Acid Counseling:  Patient advised of the small risk of bleeding problems with tranexamic acid. They were also instructed to call if they developed any nausea, vomiting or diarrhea. All of the patient's questions and concerns were addressed.
Acitretin Counseling:  I discussed with the patient the risks of acitretin including but not limited to hair loss, dry lips/skin/eyes, liver damage, hyperlipidemia, depression/suicidal ideation, photosensitivity.  Serious rare side effects can include but are not limited to pancreatitis, pseudotumor cerebri, bony changes, clot formation/stroke/heart attack.  Patient understands that alcohol is contraindicated since it can result in liver toxicity and significantly prolong the elimination of the drug by many years.
Erythromycin Counseling:  I discussed with the patient the risks of erythromycin including but not limited to GI upset, allergic reaction, drug rash, diarrhea, increase in liver enzymes, and yeast infections.
Mirvaso Counseling: Mirvaso is a topical medication which can decrease superficial blood flow where applied. Side effects are uncommon and include stinging, redness and allergic reactions.
Aklief counseling:  Patient advised to apply a pea-sized amount only at bedtime and wait 30 minutes after washing their face before applying.  If too drying, patient may add a non-comedogenic moisturizer.  The most commonly reported side effects including irritation, redness, scaling, dryness, stinging, burning, itching, and increased risk of sunburn.  The patient verbalized understanding of the proper use and possible adverse effects of retinoids.  All of the patient's questions and concerns were addressed.
Cyclosporine Counseling:  I discussed with the patient the risks of cyclosporine including but not limited to hypertension, gingival hyperplasia,myelosuppression, immunosuppression, liver damage, kidney damage, neurotoxicity, lymphoma, and serious infections. The patient understands that monitoring is required including baseline blood pressure, CBC, CMP, lipid panel and uric acid, and then 1-2 times monthly CMP and blood pressure.
Cantharidin Pregnancy And Lactation Text: This medication has not been proven safe during pregnancy. It is unknown if this medication is excreted in breast milk.
Colchicine Counseling:  Patient counseled regarding adverse effects including but not limited to stomach upset (nausea, vomiting, stomach pain, or diarrhea).  Patient instructed to limit alcohol consumption while taking this medication.  Colchicine may reduce blood counts especially with prolonged use.  The patient understands that monitoring of kidney function and blood counts may be required, especially at baseline. The patient verbalized understanding of the proper use and possible adverse effects of colchicine.  All of the patient's questions and concerns were addressed.
Infliximab Counseling:  I discussed with the patient the risks of infliximab including but not limited to myelosuppression, immunosuppression, autoimmune hepatitis, demyelinating diseases, lymphoma, and serious infections.  The patient understands that monitoring is required including a PPD at baseline and must alert us or the primary physician if symptoms of infection or other concerning signs are noted.
Otezla Pregnancy And Lactation Text: This medication is Pregnancy Category C and it isn't known if it is safe during pregnancy. It is unknown if it is excreted in breast milk.
Griseofulvin Counseling:  I discussed with the patient the risks of griseofulvin including but not limited to photosensitivity, cytopenia, liver damage, nausea/vomiting and severe allergy.  The patient understands that this medication is best absorbed when taken with a fatty meal (e.g., ice cream or french fries).
Rhofade Counseling: Rhofade is a topical medication which can decrease superficial blood flow where applied. Side effects are uncommon and include stinging, redness and allergic reactions.
Dupixent Pregnancy And Lactation Text: This medication likely crosses the placenta but the risk for the fetus is uncertain. This medication is excreted in breast milk.
Zyclara Counseling:  I discussed with the patient the risks of imiquimod including but not limited to erythema, scaling, itching, weeping, crusting, and pain.  Patient understands that the inflammatory response to imiquimod is variable from person to person and was educated regarded proper titration schedule.  If flu-like symptoms develop, patient knows to discontinue the medication and contact us.
Taltz Counseling: I discussed with the patient the risks of ixekizumab including but not limited to immunosuppression, serious infections, worsening of inflammatory bowel disease and drug reactions.  The patient understands that monitoring is required including a PPD at baseline and must alert us or the primary physician if symptoms of infection or other concerning signs are noted.
Tazorac Pregnancy And Lactation Text: This medication is not safe during pregnancy. It is unknown if this medication is excreted in breast milk.
Azithromycin Pregnancy And Lactation Text: This medication is considered safe during pregnancy and is also secreted in breast milk.
5-Fu Counseling: 5-Fluorouracil Counseling:  I discussed with the patient the risks of 5-fluorouracil including but not limited to erythema, scaling, itching, weeping, crusting, and pain.
Niacinamide Counseling: I recommended taking niacin or niacinamide, also know as vitamin B3, twice daily. Recent evidence suggests that taking vitamin B3 (500 mg twice daily) can reduce the risk of actinic keratoses and non-melanoma skin cancers. Side effects of vitamin B3 include flushing and headache.
Spironolactone Counseling: Patient advised regarding risks of diarrhea, abdominal pain, hyperkalemia, birth defects (for female patients), liver toxicity and renal toxicity. The patient may need blood work to monitor liver and kidney function and potassium levels while on therapy. The patient verbalized understanding of the proper use and possible adverse effects of spironolactone.  All of the patient's questions and concerns were addressed.
Aklief Pregnancy And Lactation Text: It is unknown if this medication is safe to use during pregnancy.  It is unknown if this medication is excreted in breast milk.  Breastfeeding women should use the topical cream on the smallest area of the skin for the shortest time needed while breastfeeding.  Do not apply to nipple and areola.
Cyclosporine Pregnancy And Lactation Text: This medication is Pregnancy Category C and it isn't know if it is safe during pregnancy. This medication is excreted in breast milk.
Tranexamic Acid Pregnancy And Lactation Text: It is unknown if this medication is safe during pregnancy or breast feeding.
Olumiant Counseling: I discussed with the patient the risks of Olumiant therapy including but not limited to upper respiratory tract infections, shingles, cold sores, and nausea. Live vaccines should be avoided.  This medication has been linked to serious infections; higher rate of mortality; malignancy and lymphoproliferative disorders; major adverse cardiovascular events; thrombosis; gastrointestinal perforations; neutropenia; lymphopenia; anemia; liver enzyme elevations; and lipid elevations.
Oxybutynin Counseling:  I discussed with the patient the risks of oxybutynin including but not limited to skin rash, drowsiness, dry mouth, difficulty urinating, and blurred vision.
Griseofulvin Pregnancy And Lactation Text: This medication is Pregnancy Category X and is known to cause serious birth defects. It is unknown if this medication is excreted in breast milk but breast feeding should be avoided.
Erythromycin Pregnancy And Lactation Text: This medication is Pregnancy Category B and is considered safe during pregnancy. It is also excreted in breast milk.
Adbry Counseling: I discussed with the patient the risks of tralokinumab including but not limited to eye infection and irritation, cold sores, injection site reactions, worsening of asthma, allergic reactions and increased risk of parasitic infection.  Live vaccines should be avoided while taking tralokinumab. The patient understands that monitoring is required and they must alert us or the primary physician if symptoms of infection or other concerning signs are noted.
Acitretin Pregnancy And Lactation Text: This medication is Pregnancy Category X and should not be given to women who are pregnant or may become pregnant in the future. This medication is excreted in breast milk.
Rituxan Pregnancy And Lactation Text: This medication is Pregnancy Category C and it isn't know if it is safe during pregnancy. It is unknown if this medication is excreted in breast milk but similar antibodies are known to be excreted.
Topical Sulfur Applications Pregnancy And Lactation Text: This medication is considered safe during pregnancy and breast feeding secondary to limited systemic absorption.
Mirvaso Pregnancy And Lactation Text: This medication has not been assigned a Pregnancy Risk Category. It is unknown if the medication is excreted in breast milk.
Hydroquinone Counseling:  Patient advised that medication may result in skin irritation, lightening (hypopigmentation), dryness, and burning.  In the event of skin irritation, the patient was advised to reduce the amount of the drug applied or use it less frequently.  Rarely, spots that are treated with hydroquinone can become darker (pseudoochronosis).  Should this occur, patient instructed to stop medication and call the office. The patient verbalized understanding of the proper use and possible adverse effects of hydroquinone.  All of the patient's questions and concerns were addressed.
Doxepin Counseling:  Patient advised that the medication is sedating and not to drive a car after taking this medication. Patient informed of potential adverse effects including but not limited to dry mouth, urinary retention, and blurry vision.  The patient verbalized understanding of the proper use and possible adverse effects of doxepin.  All of the patient's questions and concerns were addressed.
Topical Clindamycin Counseling: Patient counseled that this medication may cause skin irritation or allergic reactions.  In the event of skin irritation, the patient was advised to reduce the amount of the drug applied or use it less frequently.   The patient verbalized understanding of the proper use and possible adverse effects of clindamycin.  All of the patient's questions and concerns were addressed.
Niacinamide Pregnancy And Lactation Text: These medications are considered safe during pregnancy.
Spironolactone Pregnancy And Lactation Text: This medication can cause feminization of the male fetus and should be avoided during pregnancy. The active metabolite is also found in breast milk.
Bactrim Counseling:  I discussed with the patient the risks of sulfa antibiotics including but not limited to GI upset, allergic reaction, drug rash, diarrhea, dizziness, photosensitivity, and yeast infections.  Rarely, more serious reactions can occur including but not limited to aplastic anemia, agranulocytosis, methemoglobinemia, blood dyscrasias, liver or kidney failure, lung infiltrates or desquamative/blistering drug rashes.
Azelaic Acid Counseling: Patient counseled that medicine may cause skin irritation and to avoid applying near the eyes.  In the event of skin irritation, the patient was advised to reduce the amount of the drug applied or use it less frequently.   The patient verbalized understanding of the proper use and possible adverse effects of azelaic acid.  All of the patient's questions and concerns were addressed.
Methotrexate Counseling:  Patient counseled regarding adverse effects of methotrexate including but not limited to nausea, vomiting, abnormalities in liver function tests. Patients may develop mouth sores, rash, diarrhea, and abnormalities in blood counts. The patient understands that monitoring is required including LFT's and blood counts.  There is a rare possibility of scarring of the liver and lung problems that can occur when taking methotrexate. Persistent nausea, loss of appetite, pale stools, dark urine, cough, and shortness of breath should be reported immediately. Patient advised to discontinue methotrexate treatment at least three months before attempting to become pregnant.  I discussed the need for folate supplements while taking methotrexate.  These supplements can decrease side effects during methotrexate treatment. The patient verbalized understanding of the proper use and possible adverse effects of methotrexate.  All of the patient's questions and concerns were addressed.
Enbrel Counseling:  I discussed with the patient the risks of etanercept including but not limited to myelosuppression, immunosuppression, autoimmune hepatitis, demyelinating diseases, lymphoma, and infections.  The patient understands that monitoring is required including a PPD at baseline and must alert us or the primary physician if symptoms of infection or other concerning signs are noted.
Rifampin Pregnancy And Lactation Text: This medication is Pregnancy Category C and it isn't know if it is safe during pregnancy. It is also excreted in breast milk and should not be used if you are breast feeding.
Dutasteride Male Counseling: Dustasteride Counseling:  I discussed with the patient the risks of use of dutasteride including but not limited to decreased libido, decreased ejaculate volume, and gynecomastia. Women who can become pregnant should not handle medication.  All of the patient's questions and concerns were addressed.
Opzelura Counseling:  I discussed with the patient the risks of Opzelura including but not limited to nasopharngitis, bronchitis, ear infection, eosinophila, hives, diarrhea, folliculitis, tonsillitis, and rhinorrhea.  Taken orally, this medication has been linked to serious infections; higher rate of mortality; malignancy and lymphoproliferative disorders; major adverse cardiovascular events; thrombosis; thrombocytopenia, anemia, and neutropenia; and lipid elevations.
Dapsone Counseling: I discussed with the patient the risks of dapsone including but not limited to hemolytic anemia, agranulocytosis, rashes, methemoglobinemia, kidney failure, peripheral neuropathy, headaches, GI upset, and liver toxicity.  Patients who start dapsone require monitoring including baseline LFTs and weekly CBCs for the first month, then every month thereafter.  The patient verbalized understanding of the proper use and possible adverse effects of dapsone.  All of the patient's questions and concerns were addressed.
Adbry Pregnancy And Lactation Text: It is unknown if this medication will adversely affect pregnancy or breast feeding.
Siliq Counseling:  I discussed with the patient the risks of Siliq including but not limited to new or worsening depression, suicidal thoughts and behavior, immunosuppression, malignancy, posterior leukoencephalopathy syndrome, and serious infections.  The patient understands that monitoring is required including a PPD at baseline and must alert us or the primary physician if symptoms of infection or other concerning signs are noted. There is also a special program designed to monitor depression which is required with Siliq.
Wartpeel Counseling:  I discussed with the patient the risks of Wartpeel including but not limited to erythema, scaling, itching, weeping, crusting, and pain.
Valtrex Counseling: I discussed with the patient the risks of valacyclovir including but not limited to kidney damage, nausea, vomiting and severe allergy.  The patient understands that if the infection seems to be worsening or is not improving, they are to call.
Olumiant Pregnancy And Lactation Text: Based on animal studies, Olumiant may cause embryo-fetal harm when administered to pregnant women.  The medication should not be used in pregnancy.  Breastfeeding is not recommended during treatment.
Metronidazole Counseling:  I discussed with the patient the risks of metronidazole including but not limited to seizures, nausea/vomiting, a metallic taste in the mouth, nausea/vomiting and severe allergy.
Itraconazole Counseling:  I discussed with the patient the risks of itraconazole including but not limited to liver damage, nausea/vomiting, neuropathy, and severe allergy.  The patient understands that this medication is best absorbed when taken with acidic beverages such as non-diet cola or ginger ale.  The patient understands that monitoring is required including baseline LFTs and repeat LFTs at intervals.  The patient understands that they are to contact us or the primary physician if concerning signs are noted.
Drysol Counseling:  I discussed with the patient the risks of drysol/aluminum chloride including but not limited to skin rash, itching, irritation, burning.
Oxybutynin Pregnancy And Lactation Text: This medication is Pregnancy Category B and is considered safe during pregnancy. It is unknown if it is excreted in breast milk.
Bactrim Pregnancy And Lactation Text: This medication is Pregnancy Category D and is known to cause fetal risk.  It is also excreted in breast milk.
Doxepin Pregnancy And Lactation Text: This medication is Pregnancy Category C and it isn't known if it is safe during pregnancy. It is also excreted in breast milk and breast feeding isn't recommended.
Bexarotene Counseling:  I discussed with the patient the risks of bexarotene including but not limited to hair loss, dry lips/skin/eyes, liver abnormalities, hyperlipidemia, pancreatitis, depression/suicidal ideation, photosensitivity, drug rash/allergic reactions, hypothyroidism, anemia, leukopenia, infection, cataracts, and teratogenicity.  Patient understands that they will need regular blood tests to check lipid profile, liver function tests, white blood cell count, thyroid function tests and pregnancy test if applicable.
Nsaids Counseling: NSAID Counseling: I discussed with the patient that NSAIDs should be taken with food. Prolonged use of NSAIDs can result in the development of stomach ulcers.  Patient advised to stop taking NSAIDs if abdominal pain occurs.  The patient verbalized understanding of the proper use and possible adverse effects of NSAIDs.  All of the patient's questions and concerns were addressed.
Tremfya Counseling: I discussed with the patient the risks of guselkumab including but not limited to immunosuppression, serious infections, and drug reactions.  The patient understands that monitoring is required including a PPD at baseline and must alert us or the primary physician if symptoms of infection or other concerning signs are noted.
Valtrex Pregnancy And Lactation Text: this medication is Pregnancy Category B and is considered safe during pregnancy. This medication is not directly found in breast milk but it's metabolite acyclovir is present.
Rinvoq Counseling: I discussed with the patient the risks of Rinvoq therapy including but not limited to upper respiratory tract infections, shingles, cold sores, bronchitis, nausea, cough, fever, acne, and headache. Live vaccines should be avoided.  This medication has been linked to serious infections; higher rate of mortality; malignancy and lymphoproliferative disorders; major adverse cardiovascular events; thrombosis; thrombocytopenia, anemia, and neutropenia; lipid elevations; liver enzyme elevations; and gastrointestinal perforations.
Sarecycline Counseling: Patient advised regarding possible photosensitivity and discoloration of the teeth, skin, lips, tongue and gums.  Patient instructed to avoid sunlight, if possible.  When exposed to sunlight, patients should wear protective clothing, sunglasses, and sunscreen.  The patient was instructed to call the office immediately if the following severe adverse effects occur:  hearing changes, easy bruising/bleeding, severe headache, or vision changes.  The patient verbalized understanding of the proper use and possible adverse effects of sarecycline.  All of the patient's questions and concerns were addressed.
Bimzelx Counseling:  I discussed with the patient the risks of Bimzelx including but not limited to depression, immunosuppression, allergic reactions and infections.  The patient understands that monitoring is required including a PPD at baseline and must alert us or the primary physician if symptoms of infection or other concerning signs are noted.
Azelaic Acid Pregnancy And Lactation Text: This medication is considered safe during pregnancy and breast feeding.
Methotrexate Pregnancy And Lactation Text: This medication is Pregnancy Category X and is known to cause fetal harm. This medication is excreted in breast milk.
Dutasteride Female Counseling: Dutasteride Counseling:  I discussed with the patient the risks of use of dutasteride including but not limited to decreased libido and sexual dysfunction. Explained the teratogenic nature of the medication and stressed the importance of not getting pregnant during treatment. All of the patient's questions and concerns were addressed.
Solaraze Counseling:  I discussed with the patient the risks of Solaraze including but not limited to erythema, scaling, itching, weeping, crusting, and pain.
Bexarotene Pregnancy And Lactation Text: This medication is Pregnancy Category X and should not be given to women who are pregnant or may become pregnant. This medication should not be used if you are breast feeding.
Opzelura Pregnancy And Lactation Text: There is insufficient data to evaluate drug-associated risk for major birth defects, miscarriage, or other adverse maternal or fetal outcomes.  There is a pregnancy registry that monitors pregnancy outcomes in pregnant persons exposed to the medication during pregnancy.  It is unknown if this medication is excreted in breast milk.  Do not breastfeed during treatment and for about 4 weeks after the last dose.
Propranolol Counseling:  I discussed with the patient the risks of propranolol including but not limited to low heart rate, low blood pressure, low blood sugar, restlessness and increased cold sensitivity. They should call the office if they experience any of these side effects.
Opioid Counseling: I discussed with the patient the potential side effects of opioids including but not limited to addiction, altered mental status, and depression. I stressed avoiding alcohol, benzodiazepines, muscle relaxants and sleep aids unless specifically okayed by a physician. The patient verbalized understanding of the proper use and possible adverse effects of opioids. All of the patient's questions and concerns were addressed. They were instructed to flush the remaining pills down the toilet if they did not need them for pain.
Azathioprine Counseling:  I discussed with the patient the risks of azathioprine including but not limited to myelosuppression, immunosuppression, hepatotoxicity, lymphoma, and infections.  The patient understands that monitoring is required including baseline LFTs, Creatinine, possible TPMP genotyping and weekly CBCs for the first month and then every 2 weeks thereafter.  The patient verbalized understanding of the proper use and possible adverse effects of azathioprine.  All of the patient's questions and concerns were addressed.
Metronidazole Pregnancy And Lactation Text: This medication is Pregnancy Category B and considered safe during pregnancy.  It is also excreted in breast milk.
Cephalexin Counseling: I counseled the patient regarding use of cephalexin as an antibiotic for prophylactic and/or therapeutic purposes. Cephalexin (commonly prescribed under brand name Keflex) is a cephalosporin antibiotic which is active against numerous classes of bacteria, including most skin bacteria. Side effects may include nausea, diarrhea, gastrointestinal upset, rash, hives, yeast infections, and in rare cases, hepatitis, kidney disease, seizures, fever, confusion, neurologic symptoms, and others. Patients with severe allergies to penicillin medications are cautioned that there is about a 10% incidence of cross-reactivity with cephalosporins. When possible, patients with penicillin allergies should use alternatives to cephalosporins for antibiotic therapy.
Hydroxyzine Counseling: Patient advised that the medication is sedating and not to drive a car after taking this medication.  Patient informed of potential adverse effects including but not limited to dry mouth, urinary retention, and blurry vision.  The patient verbalized understanding of the proper use and possible adverse effects of hydroxyzine.  All of the patient's questions and concerns were addressed.
Topical Ketoconazole Counseling: Patient counseled that this medication may cause skin irritation or allergic reactions.  In the event of skin irritation, the patient was advised to reduce the amount of the drug applied or use it less frequently.   The patient verbalized understanding of the proper use and possible adverse effects of ketoconazole.  All of the patient's questions and concerns were addressed.
Dapsone Pregnancy And Lactation Text: This medication is Pregnancy Category C and is not considered safe during pregnancy or breast feeding.
Imiquimod Counseling:  I discussed with the patient the risks of imiquimod including but not limited to erythema, scaling, itching, weeping, crusting, and pain.  Patient understands that the inflammatory response to imiquimod is variable from person to person and was educated regarded proper titration schedule.  If flu-like symptoms develop, patient knows to discontinue the medication and contact us.
Nsaids Pregnancy And Lactation Text: These medications are considered safe up to 30 weeks gestation. It is excreted in breast milk.
Prednisone Counseling:  I discussed with the patient the risks of prolonged use of prednisone including but not limited to weight gain, insomnia, osteoporosis, mood changes, diabetes, susceptibility to infection, glaucoma and high blood pressure.  In cases where prednisone use is prolonged, patients should be monitored with blood pressure checks, serum glucose levels and an eye exam.  Additionally, the patient may need to be placed on GI prophylaxis, PCP prophylaxis, and calcium and vitamin D supplementation and/or a bisphosphonate.  The patient verbalized understanding of the proper use and the possible adverse effects of prednisone.  All of the patient's questions and concerns were addressed.
Solaraze Pregnancy And Lactation Text: This medication is Pregnancy Category B and is considered safe. There is some data to suggest avoiding during the third trimester. It is unknown if this medication is excreted in breast milk.
Humira Counseling:  I discussed with the patient the risks of adalimumab including but not limited to myelosuppression, immunosuppression, autoimmune hepatitis, demyelinating diseases, lymphoma, and serious infections.  The patient understands that monitoring is required including a PPD at baseline and must alert us or the primary physician if symptoms of infection or other concerning signs are noted.
Use Enhanced Medication Counseling?: No
Rinvoq Pregnancy And Lactation Text: Based on animal studies, Rinvoq may cause embryo-fetal harm when administered to pregnant women.  The medication should not be used in pregnancy.  Breastfeeding is not recommended during treatment and for 6 days after the last dose.
Dutasteride Pregnancy And Lactation Text: This medication is absolutely contraindicated in women, especially during pregnancy and breast feeding. Feminization of male fetuses is possible if taking while pregnant.
Glycopyrrolate Counseling:  I discussed with the patient the risks of glycopyrrolate including but not limited to skin rash, drowsiness, dry mouth, difficulty urinating, and blurred vision.
Benzoyl Peroxide Counseling: Patient counseled that medicine may cause skin irritation and bleach clothing.  In the event of skin irritation, the patient was advised to reduce the amount of the drug applied or use it less frequently.   The patient verbalized understanding of the proper use and possible adverse effects of benzoyl peroxide.  All of the patient's questions and concerns were addressed.
Isotretinoin Counseling: Patient should get monthly blood tests, not donate blood, not drive at night if vision affected, not share medication, and not undergo elective surgery for 6 months after tx completed. Side effects reviewed, pt to contact office should one occur.
Picato Counseling:  I discussed with the patient the risks of Picato including but not limited to erythema, scaling, itching, weeping, crusting, and pain.
Minocycline Counseling: Patient advised regarding possible photosensitivity and discoloration of the teeth, skin, lips, tongue and gums.  Patient instructed to avoid sunlight, if possible.  When exposed to sunlight, patients should wear protective clothing, sunglasses, and sunscreen.  The patient was instructed to call the office immediately if the following severe adverse effects occur:  hearing changes, easy bruising/bleeding, severe headache, or vision changes.  The patient verbalized understanding of the proper use and possible adverse effects of minocycline.  All of the patient's questions and concerns were addressed.
Azathioprine Pregnancy And Lactation Text: This medication is Pregnancy Category D and isn't considered safe during pregnancy. It is unknown if this medication is excreted in breast milk.
Bimzelx Pregnancy And Lactation Text: This medication crosses the placenta and the safety is uncertain during pregnancy. It is unknown if this medication is present in breast milk.
Opioid Pregnancy And Lactation Text: These medications can lead to premature delivery and should be avoided during pregnancy. These medications are also present in breast milk in small amounts.
Simponi Counseling:  I discussed with the patient the risks of golimumab including but not limited to myelosuppression, immunosuppression, autoimmune hepatitis, demyelinating diseases, lymphoma, and serious infections.  The patient understands that monitoring is required including a PPD at baseline and must alert us or the primary physician if symptoms of infection or other concerning signs are noted.
Winlevi Counseling:  I discussed with the patient the risks of topical clascoterone including but not limited to erythema, scaling, itching, and stinging. Patient voiced their understanding.
Hydroxyzine Pregnancy And Lactation Text: This medication is not safe during pregnancy and should not be taken. It is also excreted in breast milk and breast feeding isn't recommended.
Gabapentin Counseling: I discussed with the patient the risks of gabapentin including but not limited to dizziness, somnolence, fatigue and ataxia.
Olanzapine Counseling- I discussed with the patient the common side effects of olanzapine including but are not limited to: lack of energy, dry mouth, increased appetite, sleepiness, tremor, constipation, dizziness, changes in behavior, or restlessness.  Explained that teenagers are more likely to experience headaches, abdominal pain, pain in the arms or legs, tiredness, and sleepiness.  Serious side effects include but are not limited: increased risk of death in elderly patients who are confused, have memory loss, or dementia-related psychosis; hyperglycemia; increased cholesterol and triglycerides; and weight gain.
Sarecycline Pregnancy And Lactation Text: This medication is Pregnancy Category D and not consider safe during pregnancy. It is also excreted in breast milk.
Propranolol Pregnancy And Lactation Text: This medication is Pregnancy Category C and it isn't known if it is safe during pregnancy. It is excreted in breast milk.
Ketoconazole Counseling:   Patient counseled regarding improving absorption with orange juice.  Adverse effects include but are not limited to breast enlargement, headache, diarrhea, nausea, upset stomach, liver function test abnormalities, taste disturbance, and stomach pain.  There is a rare possibility of liver failure that can occur when taking ketoconazole. The patient understands that monitoring of LFTs may be required, especially at baseline. The patient verbalized understanding of the proper use and possible adverse effects of ketoconazole.  All of the patient's questions and concerns were addressed.
Detail Level: Simple
Benzoyl Peroxide Pregnancy And Lactation Text: This medication is Pregnancy Category C. It is unknown if benzoyl peroxide is excreted in breast milk.
Soolantra Counseling: I discussed with the patients the risks of topial Soolantra. This is a medicine which decreases the number of mites and inflammation in the skin. You experience burning, stinging, eye irritation or allergic reactions.  Please call our office if you develop any problems from using this medication.
Xolair Counseling:  Patient informed of potential adverse effects including but not limited to fever, muscle aches, rash and allergic reactions.  The patient verbalized understanding of the proper use and possible adverse effects of Xolair.  All of the patient's questions and concerns were addressed.
Cephalexin Pregnancy And Lactation Text: This medication is Pregnancy Category B and considered safe during pregnancy.  It is also excreted in breast milk but can be used safely for shorter doses.
Glycopyrrolate Pregnancy And Lactation Text: This medication is Pregnancy Category B and is considered safe during pregnancy. It is unknown if it is excreted breast milk.
Sotyktu Counseling:  I discussed the most common side effects of Sotyktu including: common cold, sore throat, sinus infections, cold sores, canker sores, folliculitis, and acne.  I also discussed more serious side effects of Sotyktu including but not limited to: serious allergic reactions; increased risk for infections such as TB; cancers such as lymphomas; rhabdomyolysis and elevated CPK; and elevated triglycerides and liver enzymes. 
Winlevi Pregnancy And Lactation Text: This medication is considered safe during pregnancy and breastfeeding.
Cimzia Counseling:  I discussed with the patient the risks of Cimzia including but not limited to immunosuppression, allergic reactions and infections.  The patient understands that monitoring is required including a PPD at baseline and must alert us or the primary physician if symptoms of infection or other concerning signs are noted.
Topical Metronidazole Counseling: Metronidazole is a topical antibiotic medication. You may experience burning, stinging, redness, or allergic reactions.  Please call our office if you develop any problems from using this medication.
SSKI Counseling:  I discussed with the patient the risks of SSKI including but not limited to thyroid abnormalities, metallic taste, GI upset, fever, headache, acne, arthralgias, paraesthesias, lymphadenopathy, easy bleeding, arrhythmias, and allergic reaction.
Ketoconazole Pregnancy And Lactation Text: This medication is Pregnancy Category C and it isn't know if it is safe during pregnancy. It is also excreted in breast milk and breast feeding isn't recommended.
Finasteride Male Counseling: Finasteride Counseling:  I discussed with the patient the risks of use of finasteride including but not limited to decreased libido, decreased ejaculate volume, gynecomastia, and depression. Women should not handle medication.  All of the patient's questions and concerns were addressed.
Isotretinoin Pregnancy And Lactation Text: This medication is Pregnancy Category X and is considered extremely dangerous during pregnancy. It is unknown if it is excreted in breast milk.
Cellcept Counseling:  I discussed with the patient the risks of mycophenolate mofetil including but not limited to infection/immunosuppression, GI upset, hypokalemia, hypercholesterolemia, bone marrow suppression, lymphoproliferative disorders, malignancy, GI ulceration/bleed/perforation, colitis, interstitial lung disease, kidney failure, progressive multifocal leukoencephalopathy, and birth defects.  The patient understands that monitoring is required including a baseline creatinine and regular CBC testing. In addition, patient must alert us immediately if symptoms of infection or other concerning signs are noted.
Tetracycline Counseling: Patient counseled regarding possible photosensitivity and increased risk for sunburn.  Patient instructed to avoid sunlight, if possible.  When exposed to sunlight, patients should wear protective clothing, sunglasses, and sunscreen.  The patient was instructed to call the office immediately if the following severe adverse effects occur:  hearing changes, easy bruising/bleeding, severe headache, or vision changes.  The patient verbalized understanding of the proper use and possible adverse effects of tetracycline.  All of the patient's questions and concerns were addressed. Patient understands to avoid pregnancy while on therapy due to potential birth defects.
Clindamycin Counseling: I counseled the patient regarding use of clindamycin as an antibiotic for prophylactic and/or therapeutic purposes. Clindamycin is active against numerous classes of bacteria, including skin bacteria. Side effects may include nausea, diarrhea, gastrointestinal upset, rash, hives, yeast infections, and in rare cases, colitis.
Klisyri Counseling:  I discussed with the patient the risks of Klisyri including but not limited to erythema, scaling, itching, weeping, crusting, and pain.
Olanzapine Pregnancy And Lactation Text: This medication is pregnancy category C.   There are no adequate and well controlled trials with olanzapine in pregnant females.  Olanzapine should be used during pregnancy only if the potential benefit justifies the potential risk to the fetus.   In a study in lactating healthy women, olanzapine was excreted in breast milk.  It is recommended that women taking olanzapine should not breast feed.
Albendazole Counseling:  I discussed with the patient the risks of albendazole including but not limited to cytopenia, kidney damage, nausea/vomiting and severe allergy.  The patient understands that this medication is being used in an off-label manner.
VTAMA Counseling: I discussed with the patient that VTAMA is not for use in the eyes, mouth or mouth. They should call the office if they develop any signs of allergic reactions to VTAMA. The patient verbalized understanding of the proper use and possible adverse effects of VTAMA.  All of the patient's questions and concerns were addressed.
Carac Counseling:  I discussed with the patient the risks of Carac including but not limited to erythema, scaling, itching, weeping, crusting, and pain.
Soolantra Pregnancy And Lactation Text: This medication is Pregnancy Category C. This medication is considered safe during breast feeding.
Xolair Pregnancy And Lactation Text: This medication is Pregnancy Category B and is considered safe during pregnancy. This medication is excreted in breast milk.
Arava Counseling:  Patient counseled regarding adverse effects of Arava including but not limited to nausea, vomiting, abnormalities in liver function tests. Patients may develop mouth sores, rash, diarrhea, and abnormalities in blood counts. The patient understands that monitoring is required including LFTs and blood counts.  There is a rare possibility of scarring of the liver and lung problems that can occur when taking methotrexate. Persistent nausea, loss of appetite, pale stools, dark urine, cough, and shortness of breath should be reported immediately. Patient advised to discontinue Arava treatment and consult with a physician prior to attempting conception. The patient will have to undergo a treatment to eliminate Arava from the body prior to conception.
Hyrimoz Counseling:  I discussed with the patient the risks of adalimumab including but not limited to myelosuppression, immunosuppression, autoimmune hepatitis, demyelinating diseases, lymphoma, and serious infections.  The patient understands that monitoring is required including a PPD at baseline and must alert us or the primary physician if symptoms of infection or other concerning signs are noted.
Hydroxychloroquine Counseling:  I discussed with the patient that a baseline ophthalmologic exam is needed at the start of therapy and every year thereafter while on therapy. A CBC may also be warranted for monitoring.  The side effects of this medication were discussed with the patient, including but not limited to agranulocytosis, aplastic anemia, seizures, rashes, retinopathy, and liver toxicity. Patient instructed to call the office should any adverse effect occur.  The patient verbalized understanding of the proper use and possible adverse effects of Plaquenil.  All the patient's questions and concerns were addressed.
Finasteride Female Counseling: Finasteride Counseling:  I discussed with the patient the risks of use of finasteride including but not limited to decreased libido and sexual dysfunction. Explained the teratogenic nature of the medication and stressed the importance of not getting pregnant during treatment. All of the patient's questions and concerns were addressed.
Protopic Counseling: Patient may experience a mild burning sensation during topical application. Protopic is not approved in children less than 2 years of age. There have been case reports of hematologic and skin malignancies in patients using topical calcineurin inhibitors although causality is questionable.
Cimzia Pregnancy And Lactation Text: This medication crosses the placenta but can be considered safe in certain situations. Cimzia may be excreted in breast milk.
Terbinafine Counseling: Patient counseling regarding adverse effects of terbinafine including but not limited to headache, diarrhea, rash, upset stomach, liver function test abnormalities, itching, taste/smell disturbance, nausea, abdominal pain, and flatulence.  There is a rare possibility of liver failure that can occur when taking terbinafine.  The patient understands that a baseline LFT and kidney function test may be required. The patient verbalized understanding of the proper use and possible adverse effects of terbinafine.  All of the patient's questions and concerns were addressed.
Cibinqo Counseling: I discussed with the patient the risks of Cibinqo therapy including but not limited to common cold, nausea, headache, cold sores, increased blood CPK levels, dizziness, UTIs, fatigue, acne, and vomitting. Live vaccines should be avoided.  This medication has been linked to serious infections; higher rate of mortality; malignancy and lymphoproliferative disorders; major adverse cardiovascular events; thrombosis; thrombocytopenia and lymphopenia; lipid elevations; and retinal detachment.
Sski Pregnancy And Lactation Text: This medication is Pregnancy Category D and isn't considered safe during pregnancy. It is excreted in breast milk.
Sotyktu Pregnancy And Lactation Text: There is insufficient data to evaluate whether or not Sotyktu is safe to use during pregnancy.   It is not known if Sotyktu passes into breast milk and whether or not it is safe to use when breastfeeding.  
Skyrizi Counseling: I discussed with the patient the risks of risankizumab-rzaa including but not limited to immunosuppression, and serious infections.  The patient understands that monitoring is required including a PPD at baseline and must alert us or the primary physician if symptoms of infection or other concerning signs are noted.
High Dose Vitamin A Counseling: Side effects reviewed, pt to contact office should one occur.
Quinolones Counseling:  I discussed with the patient the risks of fluoroquinolones including but not limited to GI upset, allergic reaction, drug rash, diarrhea, dizziness, photosensitivity, yeast infections, liver function test abnormalities, tendonitis/tendon rupture.
Clindamycin Pregnancy And Lactation Text: This medication can be used in pregnancy if certain situations. Clindamycin is also present in breast milk.
Topical Metronidazole Pregnancy And Lactation Text: This medication is Pregnancy Category B and considered safe during pregnancy.  It is also considered safe to use while breastfeeding.
Oral Minoxidil Counseling- I discussed with the patient the risks of oral minoxidil including but not limited to shortness of breath, swelling of the feet or ankles, dizziness, lightheadedness, unwanted hair growth and allergic reaction.  The patient verbalized understanding of the proper use and possible adverse effects of oral minoxidil.  All of the patient's questions and concerns were addressed.
Topical Retinoid counseling:  Patient advised to apply a pea-sized amount only at bedtime and wait 30 minutes after washing their face before applying.  If too drying, patient may add a non-comedogenic moisturizer. The patient verbalized understanding of the proper use and possible adverse effects of retinoids.  All of the patient's questions and concerns were addressed.
Hydroxychloroquine Pregnancy And Lactation Text: This medication has been shown to cause fetal harm but it isn't assigned a Pregnancy Risk Category. There are small amounts excreted in breast milk.
Klisyri Pregnancy And Lactation Text: It is unknown if this medication can harm a developing fetus or if it is excreted in breast milk.
Libtayo Counseling- I discussed with the patient the risks of Libtayo including but not limited to nausea, vomiting, diarrhea, and bone or muscle pain.  The patient verbalized understanding of the proper use and possible adverse effects of Libtayo.  All of the patient's questions and concerns were addressed.

## 2024-04-26 NOTE — PROCEDURE: PRESCRIPTION MEDICATION MANAGEMENT
Render In Strict Bullet Format?: No
Detail Level: Zone
Continue Regimen: Pt advised he can continue the alcometasone and clobetasol as needed. Discussed with pt we can schedule another appt to talk about treating the eczema, recommended Dupixent.

## 2024-05-09 ENCOUNTER — APPOINTMENT (RX ONLY)
Dept: URBAN - METROPOLITAN AREA CLINIC 15 | Facility: CLINIC | Age: 64
Setting detail: DERMATOLOGY
End: 2024-05-09

## 2024-05-09 DIAGNOSIS — B07.8 OTHER VIRAL WARTS: ICD-10-CM

## 2024-05-09 DIAGNOSIS — B35.4 TINEA CORPORIS: ICD-10-CM | Status: IMPROVED

## 2024-05-09 PROCEDURE — 17110 DESTRUCTION B9 LES UP TO 14: CPT

## 2024-05-09 PROCEDURE — 99213 OFFICE O/P EST LOW 20 MIN: CPT | Mod: 25

## 2024-05-09 PROCEDURE — ? MEDICATION COUNSELING

## 2024-05-09 PROCEDURE — ? LIQUID NITROGEN

## 2024-05-09 PROCEDURE — ? TREATMENT REGIMEN

## 2024-05-09 PROCEDURE — ? COUNSELING

## 2024-05-09 ASSESSMENT — LOCATION SIMPLE DESCRIPTION DERM
LOCATION SIMPLE: GROIN
LOCATION SIMPLE: LEFT THIGH
LOCATION SIMPLE: RIGHT ANTERIOR NECK
LOCATION SIMPLE: LEFT ANTERIOR NECK
LOCATION SIMPLE: RIGHT THIGH
LOCATION SIMPLE: LEFT PRETIBIAL REGION

## 2024-05-09 ASSESSMENT — LOCATION DETAILED DESCRIPTION DERM
LOCATION DETAILED: SUPRAPUBIC SKIN
LOCATION DETAILED: RIGHT ANTERIOR PROXIMAL THIGH
LOCATION DETAILED: RIGHT SUPERIOR ANTERIOR NECK
LOCATION DETAILED: LEFT SUPERIOR ANTERIOR NECK
LOCATION DETAILED: LEFT MEDIAL PROXIMAL PRETIBIAL REGION
LOCATION DETAILED: LEFT ANTERIOR PROXIMAL THIGH

## 2024-05-09 ASSESSMENT — LOCATION ZONE DERM
LOCATION ZONE: NECK
LOCATION ZONE: TRUNK
LOCATION ZONE: LEG

## 2024-05-09 NOTE — PROCEDURE: LIQUID NITROGEN
Spray Paint Technique: No
Medical Necessity Clause: This procedure was medically necessary because the lesions that were treated were:
Show Applicator Variable?: Yes
Spray Paint Text: The liquid nitrogen was applied to the skin utilizing a spray paint frosting technique.
Detail Level: Detailed
Post-Care Instructions: I reviewed with the patient in detail post-care instructions. Patient is to wear sunprotection, and avoid picking at any of the treated lesions. Pt may apply Vaseline to crusted or scabbing areas.
Duration Of Freeze Thaw-Cycle (Seconds): 5
Consent: The patient's consent was obtained including but not limited to risks of crusting, scabbing, blistering, scarring, darker or lighter pigmentary change, recurrence, incomplete removal and infection.
Medical Necessity Information: It is in your best interest to select a reason for this procedure from the list below. All of these items fulfill various CMS LCD requirements except the new and changing color options.
Number Of Freeze-Thaw Cycles: 2 freeze-thaw cycles

## 2024-05-20 ENCOUNTER — ANTICOAGULATION MONITORING (OUTPATIENT)
Dept: VASCULAR LAB | Facility: MEDICAL CENTER | Age: 64
End: 2024-05-20

## 2024-05-20 VITALS — DIASTOLIC BLOOD PRESSURE: 78 MMHG | SYSTOLIC BLOOD PRESSURE: 141 MMHG | HEART RATE: 65 BPM | OXYGEN SATURATION: 95 %

## 2024-05-20 DIAGNOSIS — I82.409 DEEP VEIN THROMBOSIS (DVT) OF LOWER EXTREMITY, UNSPECIFIED CHRONICITY, UNSPECIFIED LATERALITY, UNSPECIFIED VEIN (HCC): ICD-10-CM

## 2024-05-20 LAB — INR PPP: 3.1 (ref 2–3.5)

## 2024-05-20 PROCEDURE — 3078F DIAST BP <80 MM HG: CPT | Mod: 95 | Performed by: NURSE PRACTITIONER

## 2024-05-20 PROCEDURE — 3077F SYST BP >= 140 MM HG: CPT | Mod: 95 | Performed by: NURSE PRACTITIONER

## 2024-05-20 PROCEDURE — 99214 OFFICE O/P EST MOD 30 MIN: CPT | Mod: 95 | Performed by: NURSE PRACTITIONER

## 2024-05-20 RX ORDER — WARFARIN SODIUM 5 MG/1
TABLET ORAL
Qty: 135 TABLET | Refills: 1 | Status: SHIPPED | OUTPATIENT
Start: 2024-05-20

## 2024-05-20 RX ORDER — WARFARIN SODIUM 5 MG/1
TABLET ORAL
Qty: 135 TABLET | Refills: 1 | Status: SHIPPED | OUTPATIENT
Start: 2024-05-20 | End: 2024-05-20 | Stop reason: SDUPTHER

## 2024-05-20 NOTE — PROGRESS NOTES
[facility] Telemedicine Anticoagulation Service Note    05/20/24    This evaluation was conducted via telemedicine visit using secure and encrypted videoconferencing technology. The patient was physically located at [facility] in [place], [place]. The patient was presented by [facility] Professional MELVIN Sales. The patient's identity was confirmed and verbal consent for the telemedicine encounter was obtained.    Anticoagulation Summary  As of 5/20/2024      INR goal:  2.0-3.0   TTR:  85.1% (1.8 y)   INR used for dosing:  3.10 (5/20/2024)   Warfarin maintenance plan:  7.5 mg (5 mg x 1.5) every Mon, Fri; 5 mg (5 mg x 1) all other days   Weekly warfarin total:  40 mg   Plan last modified:  Silvia Smalls AYohanPYohanNYohan (1/23/2023)   Next INR check:  6/10/2024   Target end date:  Indefinite    Indications    Acute pulmonary embolism (HCC) (Resolved) [I26.99]  Deep vein thrombosis (HCC) [I82.409]                 Anticoagulation Episode Summary       INR check location:      Preferred lab:      Send INR reminders to:      Comments:  PCP has determined indefinite therapy for unprovoked DVT/PE  Possible Eliquis failure (PE a few days after DVT and starting Eliquis)                Refer to Patient Findings for HPI:  Patient Findings       Negatives:  Signs/symptoms of thrombosis, Signs/symptoms of bleeding, Laboratory test error suspected, Change in health, Change in alcohol use, Change in activity, Upcoming invasive procedure, Emergency department visit, Upcoming dental procedure, Missed doses, Extra doses, Change in medications, Change in diet/appetite, Hospital admission, Bruising, Other complaints            Vitals:    05/20/24 0933   BP: (!) 141/78   Pulse: 65   SpO2: 95%       Verified current warfarin dosing schedule.    Pt is not on antiplatelet therapy.    Review of Systems   HENT:  Negative for nosebleeds.   Respiratory: Negative for shortness of breath.  Gastrointestinal: Negative for blood in stool.   Genitourinary: Negative for hematuria.   Psychiatric/Behavioral: The patient is not nervous/anxious.     Physical Exam   Constitutional: Oriented to person, place, and time. Appears well-developed and well-nourished.   Pulmonary/Chest: Effort normal. No respiratory distress.   Skin: Not diaphoretic.  Psychiatric: Normal mood and affect. Behavior is normal.    A/P   INR is slightly supratherapeutic. He is normally very stable on this regimen. Will have him take a one time dose decrease.    Warfarin dosing recommendation: take 5 mg tonight then resume 7.5 mg Mon/Fri, 5 mg AODs.    Follow up appointment in 3 week(s).    Next Appointment: Monday, Sanjuana 10, 2024 at 9:15 am.     Pt educated to contact our clinic with any changes in medications or s/s of bleeding or thrombosis. Pt is aware to seek immediate medical attention for falls, head injury or deep cuts. Review all of your home medications and newly ordered medications with your doctor and / or pharmacist. Follow medication instructions as directed by your doctor and / or pharmacist. Please keep your medication list with you and share with your physician. Update the information when medications are discontinued, doses are changed, or new medications (including over-the-counter products) are added; and carry medication information at all times in the event of emergency situations.    CHEST guidelines recommend frequent INR monitoring at regular intervals (a few days up to a max of 12 weeks) to ensure they are on the proper dose of warfarin and not having any complications from therapy.  INRs can dramatically change over a short time period due to diet, medications, and medical conditions.   The patient is instructed to go to the ER for falls with a head injury, blood in urine or stool or any bleeding that last longer than 20 min.   For questions,  please contact Outpatient Anticoagulation Service [phone].         PAULA Rivera.  [facility]  [phone]

## 2024-06-27 ENCOUNTER — APPOINTMENT (RX ONLY)
Dept: URBAN - METROPOLITAN AREA CLINIC 35 | Facility: CLINIC | Age: 64
Setting detail: DERMATOLOGY
End: 2024-06-27

## 2024-06-27 DIAGNOSIS — L20.89 OTHER ATOPIC DERMATITIS: ICD-10-CM | Status: WELL CONTROLLED

## 2024-06-27 DIAGNOSIS — L81.4 OTHER MELANIN HYPERPIGMENTATION: ICD-10-CM

## 2024-06-27 DIAGNOSIS — Z85.828 PERSONAL HISTORY OF OTHER MALIGNANT NEOPLASM OF SKIN: ICD-10-CM

## 2024-06-27 DIAGNOSIS — D18.0 HEMANGIOMA: ICD-10-CM

## 2024-06-27 DIAGNOSIS — Z71.89 OTHER SPECIFIED COUNSELING: ICD-10-CM

## 2024-06-27 DIAGNOSIS — D22 MELANOCYTIC NEVI: ICD-10-CM

## 2024-06-27 DIAGNOSIS — L82.1 OTHER SEBORRHEIC KERATOSIS: ICD-10-CM

## 2024-06-27 PROBLEM — D18.01 HEMANGIOMA OF SKIN AND SUBCUTANEOUS TISSUE: Status: ACTIVE | Noted: 2024-06-27

## 2024-06-27 PROBLEM — D22.5 MELANOCYTIC NEVI OF TRUNK: Status: ACTIVE | Noted: 2024-06-27

## 2024-06-27 PROCEDURE — 99213 OFFICE O/P EST LOW 20 MIN: CPT

## 2024-06-27 PROCEDURE — ? PRESCRIPTION

## 2024-06-27 PROCEDURE — ? COUNSELING

## 2024-06-27 PROCEDURE — ? TREATMENT REGIMEN

## 2024-06-27 RX ORDER — CLOBETASOL PROPIONATE 0.5 MG/G
THIN LAYER CREAM TOPICAL BID
Qty: 60 | Refills: 1 | Status: ERX

## 2024-06-27 ASSESSMENT — LOCATION SIMPLE DESCRIPTION DERM
LOCATION SIMPLE: LEFT UPPER BACK
LOCATION SIMPLE: LOWER BACK
LOCATION SIMPLE: LEFT CALF
LOCATION SIMPLE: RIGHT FOREHEAD
LOCATION SIMPLE: RIGHT SHOULDER
LOCATION SIMPLE: ABDOMEN
LOCATION SIMPLE: RIGHT CALF
LOCATION SIMPLE: LEFT SHOULDER

## 2024-06-27 ASSESSMENT — LOCATION ZONE DERM
LOCATION ZONE: TRUNK
LOCATION ZONE: ARM
LOCATION ZONE: LEG
LOCATION ZONE: FACE

## 2024-06-27 ASSESSMENT — LOCATION DETAILED DESCRIPTION DERM
LOCATION DETAILED: RIGHT DISTAL CALF
LOCATION DETAILED: RIGHT FOREHEAD
LOCATION DETAILED: EPIGASTRIC SKIN
LOCATION DETAILED: SUPERIOR LUMBAR SPINE
LOCATION DETAILED: LEFT DISTAL CALF
LOCATION DETAILED: RIGHT LATERAL SHOULDER
LOCATION DETAILED: LEFT POSTERIOR SHOULDER
LOCATION DETAILED: LEFT MEDIAL UPPER BACK

## 2024-06-27 ASSESSMENT — ITCH NUMERIC RATING SCALE: HOW SEVERE IS YOUR ITCHING?: 0

## 2024-07-01 ENCOUNTER — ANTICOAGULATION MONITORING (OUTPATIENT)
Dept: VASCULAR LAB | Facility: MEDICAL CENTER | Age: 64
End: 2024-07-01
Payer: MEDICARE

## 2024-07-01 ENCOUNTER — APPOINTMENT (OUTPATIENT)
Dept: VASCULAR LAB | Facility: MEDICAL CENTER | Age: 64
End: 2024-07-01
Payer: MEDICARE

## 2024-07-01 VITALS
TEMPERATURE: 97.6 F | HEART RATE: 62 BPM | SYSTOLIC BLOOD PRESSURE: 134 MMHG | DIASTOLIC BLOOD PRESSURE: 67 MMHG | OXYGEN SATURATION: 97 %

## 2024-07-01 DIAGNOSIS — I82.409 DEEP VEIN THROMBOSIS (DVT) OF LOWER EXTREMITY, UNSPECIFIED CHRONICITY, UNSPECIFIED LATERALITY, UNSPECIFIED VEIN (HCC): ICD-10-CM

## 2024-07-01 LAB — INR PPP: 2.4 (ref 2–3.5)

## 2024-07-01 PROCEDURE — 99213 OFFICE O/P EST LOW 20 MIN: CPT | Performed by: NURSE PRACTITIONER

## 2024-07-01 PROCEDURE — 3075F SYST BP GE 130 - 139MM HG: CPT | Performed by: NURSE PRACTITIONER

## 2024-07-01 PROCEDURE — 3078F DIAST BP <80 MM HG: CPT | Performed by: NURSE PRACTITIONER

## 2024-07-22 ENCOUNTER — ANTICOAGULATION MONITORING (OUTPATIENT)
Dept: VASCULAR LAB | Facility: MEDICAL CENTER | Age: 64
End: 2024-07-22
Payer: MEDICARE

## 2024-07-22 VITALS — DIASTOLIC BLOOD PRESSURE: 75 MMHG | HEART RATE: 67 BPM | SYSTOLIC BLOOD PRESSURE: 133 MMHG | OXYGEN SATURATION: 97 %

## 2024-07-22 DIAGNOSIS — I82.409 DEEP VEIN THROMBOSIS (DVT) OF LOWER EXTREMITY, UNSPECIFIED CHRONICITY, UNSPECIFIED LATERALITY, UNSPECIFIED VEIN (HCC): ICD-10-CM

## 2024-07-22 LAB — INR PPP: 2.7 (ref 2–3.5)

## 2024-07-22 PROCEDURE — 3078F DIAST BP <80 MM HG: CPT | Performed by: NURSE PRACTITIONER

## 2024-07-22 PROCEDURE — 3075F SYST BP GE 130 - 139MM HG: CPT | Performed by: NURSE PRACTITIONER

## 2024-07-22 PROCEDURE — 99213 OFFICE O/P EST LOW 20 MIN: CPT | Performed by: NURSE PRACTITIONER

## 2024-08-19 ENCOUNTER — ANTICOAGULATION MONITORING (OUTPATIENT)
Dept: VASCULAR LAB | Facility: MEDICAL CENTER | Age: 64
End: 2024-08-19
Payer: MEDICARE

## 2024-08-19 VITALS — SYSTOLIC BLOOD PRESSURE: 128 MMHG | HEART RATE: 59 BPM | DIASTOLIC BLOOD PRESSURE: 70 MMHG | OXYGEN SATURATION: 97 %

## 2024-08-19 DIAGNOSIS — I82.409 DEEP VEIN THROMBOSIS (DVT) OF LOWER EXTREMITY, UNSPECIFIED CHRONICITY, UNSPECIFIED LATERALITY, UNSPECIFIED VEIN (HCC): ICD-10-CM

## 2024-08-19 LAB — INR PPP: 2.8 (ref 2–3.5)

## 2024-08-19 NOTE — PROGRESS NOTES
[facility] Telemedicine Anticoagulation Service Note    24    This evaluation was conducted via telemedicine visit using secure and encrypted videoconferencing technology. The patient was physically located at [facility] in [place], [place]. The patient was presented by [facility] Medical Professional MELVIN Sales. The patient's identity was confirmed and verbal consent for the telemedicine encounter was obtained.    Anticoagulation Summary  As of 2024      INR goal:  2.0-3.0   TTR:  86.1% (2.1 y)   INR used for dosin.80 (2024)   Warfarin maintenance plan:  7.5 mg (5 mg x 1.5) every Mon, Fri; 5 mg (5 mg x 1) all other days   Weekly warfarin total:  40 mg   No change documented:  Nina Dewitt, A.P.NYohan   Plan last modified:  Silvia Smalls A.P.NYohan (2023)   Next INR check:  2024   Target end date:  Indefinite    Indications    Acute pulmonary embolism (HCC) (Resolved) [I26.99]  Deep vein thrombosis (HCC) [I82.409]                 Anticoagulation Episode Summary       INR check location:  --    Preferred lab:  --    Send INR reminders to:  --    Comments:  PCP has determined indefinite therapy for unprovoked DVT/PE  Possible Eliquis failure (PE a few days after DVT and starting Eliquis)                Refer to Patient Findings for HPI:  Patient Findings       Negatives:  Signs/symptoms of thrombosis, Signs/symptoms of bleeding, Laboratory test error suspected, Change in health, Change in alcohol use, Change in activity, Upcoming invasive procedure, Emergency department visit, Upcoming dental procedure, Missed doses, Extra doses, Change in medications, Change in diet/appetite, Hospital admission, Bruising, Other complaints            Vitals:    24 0906   BP: 128/70   Pulse: (!) 59   SpO2: 97%       Verified current warfarin dosing schedule.    Pt is not  on antiplatelet therapy.    Review of Systems   HENT: Negative for nosebleeds.   Respiratory: Negative for shortness of breath.  Gastrointestinal: Negative for blood in stool.   Genitourinary: Negative for hematuria.   Psychiatric/Behavioral: The patient is not nervous/anxious.     Physical Exam   Constitutional: Oriented to person, place, and time. Appears well-developed and well-nourished.   Pulmonary/Chest: Effort normal. No respiratory distress.   Skin: Not diaphoretic.  Psychiatric: Normal mood and affect. Behavior is normal.    A/P   INR is therapeutic.    Warfarin dosing recommendation: Continue 7.5 mg Mondays/Fridays, 5 mg AODs.    Follow up appointment in 5 week(s).    Next Appointment: Monday, September 23, 2024 at 9:15 am.     Pt educated to contact our clinic with any changes in medications or s/s of bleeding or thrombosis. Pt is aware to seek immediate medical attention for falls, head injury or deep cuts. Review all of your home medications and newly ordered medications with your doctor and / or pharmacist. Follow medication instructions as directed by your doctor and / or pharmacist. Please keep your medication list with you and share with your physician. Update the information when medications are discontinued, doses are changed, or new medications (including over-the-counter products) are added; and carry medication information at all times in the event of emergency situations.    CHEST guidelines recommend frequent INR monitoring at regular intervals (a few days up to a max of 12 weeks) to ensure they are on the proper dose of warfarin and not having any complications from therapy.  INRs can dramatically change over a short time period due to diet, medications, and medical conditions.   The patient is instructed to go to the ER for falls with a head injury, blood in urine or stool or any bleeding that last longer than 20 min.   For questions, please contact Outpatient Anticoagulation Service [phone].         PAULA Rivera.  [facility]  [phone]

## 2024-09-23 ENCOUNTER — ANTICOAGULATION MONITORING (OUTPATIENT)
Dept: VASCULAR LAB | Facility: MEDICAL CENTER | Age: 64
End: 2024-09-23
Payer: MEDICARE

## 2024-09-23 VITALS — DIASTOLIC BLOOD PRESSURE: 75 MMHG | SYSTOLIC BLOOD PRESSURE: 127 MMHG | HEART RATE: 62 BPM | OXYGEN SATURATION: 97 %

## 2024-09-23 DIAGNOSIS — I82.409 DEEP VEIN THROMBOSIS (DVT) OF LOWER EXTREMITY, UNSPECIFIED CHRONICITY, UNSPECIFIED LATERALITY, UNSPECIFIED VEIN (HCC): ICD-10-CM

## 2024-09-23 LAB — INR PPP: 4.8 (ref 2–3.5)

## 2024-09-23 NOTE — PROGRESS NOTES
[facility]agulation Service Note    24    This evaluation was conducted via telemedicine visit using secure and encrypted videoconferencing technology. The patient was physically located at [facility] in [place], [place]. The patient was presented by [facility] MELVIN Polo. The patient's identity was confirmed and verbal consent for the telemedicine encounter was obtained.    Anticoagulation Summary  As of 2024      INR goal:  2.0-3.0   TTR:  82.7% (2.2 y)   INR used for dosin.80 (2024)   Warfarin maintenance plan:  7.5 mg (5 mg x 1.5) every Mon, Fri; 5 mg (5 mg x 1) all other days   Weekly warfarin total:  40 mg   Plan last modified:  PAULETTE SalasPYohanNYohan (2023)   Next INR check:  2024   Target end date:  Indefinite    Indications    Acute pulmonary embolism (HCC) (Resolved) [I26.99]  Deep vein thrombosis (HCC) [I82.409]                 Anticoagulation Episode Summary       INR check location:  --    Preferred lab:  --    Send INR reminders to:  --    Comments:  PCP has determined indefinite therapy for unprovoked DVT/PE  Possible Eliquis failure (PE a few days after DVT and starting Eliquis)                Refer to Patient Findings for HPI:  Patient Findings       Positives:  Change in medications    Negatives:  Signs/symptoms of thrombosis, Signs/symptoms of bleeding, Laboratory test error suspected, Change in health, Change in alcohol use, Change in activity, Upcoming invasive procedure, Emergency department visit, Upcoming dental procedure, Missed doses, Extra doses, Change in diet/appetite, Hospital admission, Bruising, Other complaints    Comments:  He was on Augmentin about a week ago due to a tooth infection but finished the course a few days ago. Had some diarrhea while taking Augmentin. No  other changes. Denies any NSAIDs or ASA use. No ETOH or cranberries.            Vitals:    09/23/24 0911   BP: 127/75   Pulse: 62   SpO2: 97%       Verified current warfarin dosing schedule.    Pt is not on antiplatelet therapy.    Review of Systems   HENT: Negative for nosebleeds.   Respiratory: Negative for shortness of breath.  Gastrointestinal: Negative for blood in stool.   Genitourinary: Negative for hematuria.   Psychiatric/Behavioral: The patient is not nervous/anxious.     Physical Exam   Constitutional: Oriented to person, place, and time. Appears well-developed and well-nourished.   Pulmonary/Chest: Effort normal. No respiratory distress.   Skin: Not diaphoretic.  Psychiatric: Normal mood and affect. Behavior is normal.    A/P   INR is supratherapeutic, possibly due to antibiotic. Will reduce his regimen and follow INR closely.    Warfarin dosing recommendation: HOLD tonight's dose, take 2.5 mg tomorrow, then resume your previous regimen as outlined on your dosing sheet.    Follow up appointment in 1 week(s).    Next Appointment: Monday, September 30, 2024 at 10:30 am.     Pt educated to contact our clinic with any changes in medications or s/s of bleeding or thrombosis. Pt is aware to seek immediate medical attention for falls, head injury or deep cuts. Review all of your home medications and newly ordered medications with your doctor and / or pharmacist. Follow medication instructions as directed by your doctor and / or pharmacist. Please keep your medication list with you and share with your physician. Update the information when medications are discontinued, doses are changed, or new medications (including over-the-counter products) are added; and carry medication information at all times in the event of emergency situations.    CHEST guidelines recommend frequent INR monitoring at regular intervals (a few days up to a max of 12 weeks) to ensure they are on the proper dose of warfarin and not having  any complications from therapy.  INRs can dramatically change over a short time period due to diet, medications, and medical conditions.   The patient is instructed to go to the ER for falls with a head injury, blood in urine or stool or any bleeding that last longer than 20 min.   For questions, please contact Outpatient Anticoagulation Service [phone].         VALORIE Rivera  [facility]  [phone]

## 2024-09-30 ENCOUNTER — ANTICOAGULATION MONITORING (OUTPATIENT)
Dept: VASCULAR LAB | Facility: MEDICAL CENTER | Age: 64
End: 2024-09-30
Payer: MEDICARE

## 2024-09-30 VITALS — HEART RATE: 59 BPM | OXYGEN SATURATION: 98 % | DIASTOLIC BLOOD PRESSURE: 74 MMHG | SYSTOLIC BLOOD PRESSURE: 140 MMHG

## 2024-09-30 DIAGNOSIS — I82.409 DEEP VEIN THROMBOSIS (DVT) OF LOWER EXTREMITY, UNSPECIFIED CHRONICITY, UNSPECIFIED LATERALITY, UNSPECIFIED VEIN (HCC): ICD-10-CM

## 2024-09-30 LAB — INR PPP: 2.6 (ref 2–3.5)

## 2024-09-30 NOTE — PROGRESS NOTES
[facility] Tele[facility]ticoagulation Service Note    24    This evaluation was conducted via telemedicine visit using secure and encrypted videoconferencing technology. The patient was physically located at [facility] in [place], [place]. The patient was presented by [facility] MELVIN Polo. The patient's identity was confirmed and verbal consent for the telemedicine encounter was obtained.    Anticoagulation Summary  As of 2024      INR goal:  2.0-3.0   TTR:  82.2% (2.2 y)   INR used for dosin.60 (2024)   Warfarin maintenance plan:  7.5 mg (5 mg x 1.5) every Mon, Fri; 5 mg (5 mg x 1) all other days   Weekly warfarin total:  40 mg   Plan last modified:  Silvia Smalls AYohanPYohanNYohan (2023)   Next INR check:  10/14/2024   Target end date:  Indefinite    Indications    Acute pulmonary embolism (HCC) (Resolved) [I26.99]  Deep vein thrombosis (HCC) [I82.409]                 Anticoagulation Episode Summary       INR check location:  --    Preferred lab:  --    Send INR reminders to:  --    Comments:  PCP has determined indefinite therapy for unprovoked DVT/PE  Possible Eliquis failure (PE a few days after DVT and starting Eliquis)                Refer to Patient Findings for HPI:  Patient Findings       Positives:  Other complaints    Negatives:  Signs/symptoms of thrombosis, Signs/symptoms of bleeding, Laboratory test error suspected, Change in health, Change in alcohol use, Change in activity, Upcoming invasive procedure, Emergency department visit, Upcoming dental procedure, Missed doses, Extra doses, Change in medications, Change in diet/appetite, Hospital admission, Bruising    Comments:  Reports some bilateral mid/low back pain. No other symptoms.            Vitals:    24 1031   BP: (!) 140/74   Pulse: (!) 59   SpO2: 98%        Verified current warfarin dosing schedule.    Pt is not on antiplatelet therapy.    Review of Systems   HENT: Negative for nosebleeds.   Respiratory: Negative for shortness of breath.  Gastrointestinal: Negative for blood in stool.   Genitourinary: Negative for hematuria.   Psychiatric/Behavioral: The patient is not nervous/anxious.     Physical Exam   Constitutional: Oriented to person, place, and time. Appears well-developed and well-nourished.   Pulmonary/Chest: Effort normal. No respiratory distress.   Skin: Not diaphoretic.  Psychiatric: Normal mood and affect. Behavior is normal.    A/P   INR is therapeutic today at 2.6. INR down from 4.8 last week. He was previously stable on this regimen. Will have him resume his usual dose of warfarin.   Recommend pt discuss back pain with PCP if symptoms persist this week or worsen.    Warfarin dosing recommendation: Take 7.5 mg Mondays/Fridays, 5 mg AODs.    Follow up appointment in 2 week(s).    Next Appointment: Monday, October 14, 2024 at 10:30 am.     Pt educated to contact our clinic with any changes in medications or s/s of bleeding or thrombosis. Pt is aware to seek immediate medical attention for falls, head injury or deep cuts. Review all of your home medications and newly ordered medications with your doctor and / or pharmacist. Follow medication instructions as directed by your doctor and / or pharmacist. Please keep your medication list with you and share with your physician. Update the information when medications are discontinued, doses are changed, or new medications (including over-the-counter products) are added; and carry medication information at all times in the event of emergency situations.    CHEST guidelines recommend frequent INR monitoring at regular intervals (a few days up to a max of 12 weeks) to ensure they are on the proper dose of warfarin and not having any complications from therapy.  INRs can dramatically change over a short time  period due to diet, medications, and medical conditions.   The patient is instructed to go to the ER for falls with a head injury, blood in urine or stool or any bleeding that last longer than 20 min.   For questions, please contact Outpatient Anticoagulation Service [phone].         VALORIE Rivera  [facility]  [phone]

## 2024-10-14 ENCOUNTER — ANTICOAGULATION MONITORING (OUTPATIENT)
Dept: VASCULAR LAB | Facility: MEDICAL CENTER | Age: 64
End: 2024-10-14
Payer: MEDICARE

## 2024-10-14 VITALS — SYSTOLIC BLOOD PRESSURE: 126 MMHG | DIASTOLIC BLOOD PRESSURE: 68 MMHG | HEART RATE: 58 BPM | OXYGEN SATURATION: 97 %

## 2024-10-14 DIAGNOSIS — I82.409 DEEP VEIN THROMBOSIS (DVT) OF LOWER EXTREMITY, UNSPECIFIED CHRONICITY, UNSPECIFIED LATERALITY, UNSPECIFIED VEIN (HCC): ICD-10-CM

## 2024-10-14 LAB — INR PPP: 2.9 (ref 2–3.5)

## 2024-10-14 PROCEDURE — 3074F SYST BP LT 130 MM HG: CPT | Performed by: NURSE PRACTITIONER

## 2024-10-14 PROCEDURE — 3078F DIAST BP <80 MM HG: CPT | Performed by: NURSE PRACTITIONER

## 2024-10-14 PROCEDURE — 99213 OFFICE O/P EST LOW 20 MIN: CPT | Performed by: NURSE PRACTITIONER

## 2024-11-04 ENCOUNTER — ANTICOAGULATION MONITORING (OUTPATIENT)
Dept: VASCULAR LAB | Facility: MEDICAL CENTER | Age: 64
End: 2024-11-04
Payer: MEDICARE

## 2024-11-04 VITALS — DIASTOLIC BLOOD PRESSURE: 72 MMHG | SYSTOLIC BLOOD PRESSURE: 130 MMHG | OXYGEN SATURATION: 96 % | HEART RATE: 62 BPM

## 2024-11-04 DIAGNOSIS — I82.409 DEEP VEIN THROMBOSIS (DVT) OF LOWER EXTREMITY, UNSPECIFIED CHRONICITY, UNSPECIFIED LATERALITY, UNSPECIFIED VEIN (HCC): ICD-10-CM

## 2024-11-04 LAB — INR PPP: 2.8 (ref 2–3.5)

## 2024-11-04 PROCEDURE — 3078F DIAST BP <80 MM HG: CPT | Performed by: NURSE PRACTITIONER

## 2024-11-04 PROCEDURE — 3075F SYST BP GE 130 - 139MM HG: CPT | Performed by: NURSE PRACTITIONER

## 2024-11-04 PROCEDURE — 99214 OFFICE O/P EST MOD 30 MIN: CPT | Performed by: NURSE PRACTITIONER

## 2024-11-04 NOTE — PROGRESS NOTES
[facility] Tele[facility]agulation Service Note    24    This evaluation was conducted via telemedicine visit using secure and encrypted videoconferencing technology. The patient was physically located at [facility] in [place], [place]. The patient was presented by [facility] MELVIN Polo. The patient's identity was confirmed and verbal consent for the telemedicine encounter was obtained.    Anticoagulation Summary  As of 2024      INR goal:  2.0-3.0   TTR:  82.9% (2.3 y)   INR used for dosin.80 (2024)   Warfarin maintenance plan:  7.5 mg (5 mg x 1.5) every Mon, Fri; 5 mg (5 mg x 1) all other days   Weekly warfarin total:  40 mg   Plan last modified:  PAULETTE SalasPYohanNYohan (2023)   Next INR check:  2024   Target end date:  Indefinite    Indications    Acute pulmonary embolism (HCC) (Resolved) [I26.99]  Deep vein thrombosis (HCC) [I82.409]                 Anticoagulation Episode Summary       INR check location:  --    Preferred lab:  --    Send INR reminders to:  --    Comments:  PCP has determined indefinite therapy for unprovoked DVT/PE  Possible Eliquis failure (PE a few days after DVT and starting Eliquis)                Refer to Patient Findings for HPI:  Patient Findings       Negatives:  Signs/symptoms of thrombosis, Signs/symptoms of bleeding, Laboratory test error suspected, Change in health, Change in alcohol use, Change in activity, Upcoming invasive procedure, Emergency department visit, Upcoming dental procedure, Missed doses, Extra doses, Change in medications, Change in diet/appetite, Hospital admission, Bruising, Other complaints            Vitals:    24 0944   BP: 130/72   Pulse: 62   SpO2: 96%       Verified current warfarin dosing schedule.    Pt is not on antiplatelet therapy.    Review of Systems    HENT: Negative for nosebleeds.   Respiratory: Negative for shortness of breath.  Gastrointestinal: Negative for blood in stool.   Genitourinary: Negative for hematuria.   Psychiatric/Behavioral: The patient is not nervous/anxious.     Physical Exam   Constitutional: Oriented to person, place, and time. Appears well-developed and well-nourished.   Pulmonary/Chest: Effort normal. No respiratory distress.   Skin: Not diaphoretic.  Psychiatric: Normal mood and affect. Behavior is normal.    A/P   INR is therapeutic.    Warfarin dosing recommendation: Continue taking 7.5 mg Mondays/Fridays, 5 mg AODs.    Follow up appointment in 4 week(s).    Next Appointment: Monday, December 2, 2024 at 10:00 am.     Pt educated to contact our clinic with any changes in medications or s/s of bleeding or thrombosis. Pt is aware to seek immediate medical attention for falls, head injury or deep cuts. Review all of your home medications and newly ordered medications with your doctor and / or pharmacist. Follow medication instructions as directed by your doctor and / or pharmacist. Please keep your medication list with you and share with your physician. Update the information when medications are discontinued, doses are changed, or new medications (including over-the-counter products) are added; and carry medication information at all times in the event of emergency situations.    CHEST guidelines recommend frequent INR monitoring at regular intervals (a few days up to a max of 12 weeks) to ensure they are on the proper dose of warfarin and not having any complications from therapy.  INRs can dramatically change over a short time period due to diet, medications, and medical conditions.   The patient is instructed to go to the ER for falls with a head injury, blood in urine or stool or any bleeding that last longer than 20 min.   For questions, please contact Outpatient Anticoagulation Service [phone].         Nina Dewitt,  PAULA.  [facility]  [phone]

## 2024-12-02 ENCOUNTER — ANTICOAGULATION MONITORING (OUTPATIENT)
Dept: VASCULAR LAB | Facility: MEDICAL CENTER | Age: 64
End: 2024-12-02
Payer: MEDICARE

## 2024-12-02 VITALS — SYSTOLIC BLOOD PRESSURE: 135 MMHG | OXYGEN SATURATION: 97 % | HEART RATE: 58 BPM | DIASTOLIC BLOOD PRESSURE: 73 MMHG

## 2024-12-02 DIAGNOSIS — I82.409 DEEP VEIN THROMBOSIS (DVT) OF LOWER EXTREMITY, UNSPECIFIED CHRONICITY, UNSPECIFIED LATERALITY, UNSPECIFIED VEIN (HCC): ICD-10-CM

## 2024-12-02 LAB — INR PPP: 2.5 (ref 2–3.5)

## 2024-12-02 PROCEDURE — 3078F DIAST BP <80 MM HG: CPT | Performed by: NURSE PRACTITIONER

## 2024-12-02 PROCEDURE — 3075F SYST BP GE 130 - 139MM HG: CPT | Performed by: NURSE PRACTITIONER

## 2024-12-02 PROCEDURE — 99213 OFFICE O/P EST LOW 20 MIN: CPT | Performed by: NURSE PRACTITIONER

## 2024-12-02 NOTE — PROGRESS NOTES
[facility] Tele[facility] Anticoagulation Service Note    24    This evaluation was conducted via telemedicine visit using secure and encrypted videoconferencing technology. The patient was physically located at  in [place], [place]. The patient was presented by [facility] [facility] MELVIN Polo. The patient's identity was confirmed and verbal consent for the telemedicine encounter was obtained.    Anticoagulation Summary  As of 2024      INR goal:  2.0-3.0   TTR:  83.5% (2.4 y)   INR used for dosin.50 (2024)   Warfarin maintenance plan:  7.5 mg (5 mg x 1.5) every Mon, Fri; 5 mg (5 mg x 1) all other days   Weekly warfarin total:  40 mg   Plan last modified:  Silvia Smalls AYohanPYohanNYohan (2023)   Next INR check:  2025   Target end date:  Indefinite    Indications    Acute pulmonary embolism (HCC) (Resolved) [I26.99]  Deep vein thrombosis (HCC) [I82.409]                 Anticoagulation Episode Summary       INR check location:  --    Preferred lab:  --    Send INR reminders to:  --    Comments:  PCP has determined indefinite therapy for unprovoked DVT/PE  Possible Eliquis failure (PE a few days after DVT and starting Eliquis)                Refer to Patient Findings for HPI:  Patient Findings       Negatives:  Signs/symptoms of thrombosis, Signs/symptoms of bleeding, Laboratory test error suspected, Change in health, Change in alcohol use, Change in activity, Upcoming invasive procedure, Emergency department visit, Upcoming dental procedure, Missed doses, Extra doses, Change in medications, Change in diet/appetite, Hospital admission, Bruising, Other complaints            Vitals:    24 0943   BP: 135/73   Pulse: (!) 58   SpO2: 97%       Verified current warfarin dosing schedule.    Pt is not on antiplatelet therapy.    Review of  Systems   HENT: Negative for nosebleeds.   Respiratory: Negative for shortness of breath.  Gastrointestinal: Negative for blood in stool.   Genitourinary: Negative for hematuria.   Psychiatric/Behavioral: The patient is not nervous/anxious.     Physical Exam   Constitutional: Oriented to person, place, and time. Appears well-developed and well-nourished.   Pulmonary/Chest: Effort normal. No respiratory distress.   Skin: Not diaphoretic.  Psychiatric: Normal mood and affect. Behavior is normal.    A/P   INR is therapeutic.    Warfarin dosing recommendation: Continue taking 7.5 mg Mondays/Fridays, 5 mg AODs.    Follow up appointment in 5 week(s).    Next Appointment: Monday, January 6, 2024 at 10:00 am.     Pt educated to contact our clinic with any changes in medications or s/s of bleeding or thrombosis. Pt is aware to seek immediate medical attention for falls, head injury or deep cuts. Review all of your home medications and newly ordered medications with your doctor and / or pharmacist. Follow medication instructions as directed by your doctor and / or pharmacist. Please keep your medication list with you and share with your physician. Update the information when medications are discontinued, doses are changed, or new medications (including over-the-counter products) are added; and carry medication information at all times in the event of emergency situations.    CHEST guidelines recommend frequent INR monitoring at regular intervals (a few days up to a max of 12 weeks) to ensure they are on the proper dose of warfarin and not having any complications from therapy.  INRs can dramatically change over a short time period due to diet, medications, and medical conditions.   The patient is instructed to go to the ER for falls with a head injury, blood in urine or stool or any bleeding that last longer than 20 min.   For questions, please contact Outpatient Anticoagulation Service [phone].         Nina Dewitt,  PAULA.  [facility]  [phone]

## 2024-12-11 DIAGNOSIS — I82.409 DEEP VEIN THROMBOSIS (DVT) OF LOWER EXTREMITY, UNSPECIFIED CHRONICITY, UNSPECIFIED LATERALITY, UNSPECIFIED VEIN (HCC): ICD-10-CM

## 2024-12-12 RX ORDER — WARFARIN SODIUM 5 MG/1
TABLET ORAL
Qty: 135 TABLET | Refills: 1 | Status: SHIPPED | OUTPATIENT
Start: 2024-12-12

## 2025-01-06 ENCOUNTER — APPOINTMENT (OUTPATIENT)
Dept: VASCULAR LAB | Facility: MEDICAL CENTER | Age: 65
End: 2025-01-06

## 2025-01-06 ENCOUNTER — ANTICOAGULATION MONITORING (OUTPATIENT)
Dept: VASCULAR LAB | Facility: MEDICAL CENTER | Age: 65
End: 2025-01-06
Payer: MEDICARE

## 2025-01-06 VITALS — OXYGEN SATURATION: 95 % | SYSTOLIC BLOOD PRESSURE: 131 MMHG | DIASTOLIC BLOOD PRESSURE: 72 MMHG | HEART RATE: 61 BPM

## 2025-01-06 DIAGNOSIS — I82.409 DEEP VEIN THROMBOSIS (DVT) OF LOWER EXTREMITY, UNSPECIFIED CHRONICITY, UNSPECIFIED LATERALITY, UNSPECIFIED VEIN (HCC): ICD-10-CM

## 2025-01-06 LAB — INR PPP: 2.8 (ref 2–3.5)

## 2025-01-06 PROCEDURE — 99213 OFFICE O/P EST LOW 20 MIN: CPT | Performed by: NURSE PRACTITIONER

## 2025-01-06 PROCEDURE — 3075F SYST BP GE 130 - 139MM HG: CPT | Performed by: NURSE PRACTITIONER

## 2025-01-06 PROCEDURE — 3078F DIAST BP <80 MM HG: CPT | Performed by: NURSE PRACTITIONER

## 2025-01-06 NOTE — PROGRESS NOTES
[facility] Tele[facility]agulation Service Note    25    This evaluation was conducted via telemedicine visit using secure and encrypted videoconferencing technology. The patient was physically located at [facility] in [place], [place]. The patient was presented by [facility] [facility] MELVIN Sales. The patient's identity was confirmed and verbal consent for the telemedicine encounter was obtained.    Anticoagulation Summary  As of 2025      INR goal:  2.0-3.0   TTR:  84.1% (2.5 y)   INR used for dosin.80 (2025)   Warfarin maintenance plan:  7.5 mg (5 mg x 1.5) every Mon, Fri; 5 mg (5 mg x 1) all other days   Weekly warfarin total:  40 mg   Plan last modified:  Silvia Smalls AYohanPYohanNYohan (2023)   Next INR check:  2025   Target end date:  Indefinite    Indications    Acute pulmonary embolism (HCC) (Resolved) [I26.99]  Deep vein thrombosis (HCC) [I82.409]                 Anticoagulation Episode Summary       INR check location:  --    Preferred lab:  --    Send INR reminders to:  --    Comments:  PCP has determined indefinite therapy for unprovoked DVT/PE  Possible Eliquis failure (PE a few days after DVT and starting Eliquis)                Refer to Patient Findings for HPI:  Patient Findings       Negatives:  Signs/symptoms of thrombosis, Signs/symptoms of bleeding, Laboratory test error suspected, Change in health, Change in alcohol use, Change in activity, Upcoming invasive procedure, Emergency department visit, Upcoming dental procedure, Missed doses, Extra doses, Change in medications, Change in diet/appetite, Hospital admission, Bruising, Other complaints            Vitals:    25 0947   BP: 131/72   Pulse: 61   SpO2: 95%       Verified current warfarin dosing schedule.    Pt is not on antiplatelet therapy.    Review of Systems   HENT:  Negative for nosebleeds.   Respiratory: Negative for shortness of breath.  Gastrointestinal: Negative for blood in stool.   Genitourinary: Negative for hematuria.   Psychiatric/Behavioral: The patient is not nervous/anxious.     Physical Exam   Constitutional: Oriented to person, place, and time. Appears well-developed and well-nourished.   Pulmonary/Chest: Effort normal. No respiratory distress.   Skin: Not diaphoretic.  Psychiatric: Normal mood and affect. Behavior is normal.    A/P   INR is therapeutic.    Warfarin dosing recommendation: Continue taking 7.5 mg Mondays/Fridays, 5 mg AODs.    Follow up appointment in 5 week(s).    Next Appointment: Monday, February 10, 2025 at 10:00 am.     Pt educated to contact our clinic with any changes in medications or s/s of bleeding or thrombosis. Pt is aware to seek immediate medical attention for falls, head injury or deep cuts. Review all of your home medications and newly ordered medications with your doctor and / or pharmacist. Follow medication instructions as directed by your doctor and / or pharmacist. Please keep your medication list with you and share with your physician. Update the information when medications are discontinued, doses are changed, or new medications (including over-the-counter products) are added; and carry medication information at all times in the event of emergency situations.    CHEST guidelines recommend frequent INR monitoring at regular intervals (a few days up to a max of 12 weeks) to ensure they are on the proper dose of warfarin and not having any complications from therapy.  INRs can dramatically change over a short time period due to diet, medications, and medical conditions.   The patient is instructed to go to the ER for falls with a head injury, blood in urine or stool or any bleeding that last longer than 20 min.   For questions, please contact Outpatient Anticoagulation Service [phone].         VALORIE Rivera  An[facility] Clinic  [phone]

## 2025-01-08 DIAGNOSIS — I82.409 DEEP VEIN THROMBOSIS (DVT) OF LOWER EXTREMITY, UNSPECIFIED CHRONICITY, UNSPECIFIED LATERALITY, UNSPECIFIED VEIN (HCC): ICD-10-CM

## 2025-01-08 DIAGNOSIS — I26.99 ACUTE PULMONARY EMBOLISM, UNSPECIFIED PULMONARY EMBOLISM TYPE, UNSPECIFIED WHETHER ACUTE COR PULMONALE PRESENT (HCC): ICD-10-CM

## 2025-02-10 ENCOUNTER — APPOINTMENT (OUTPATIENT)
Dept: VASCULAR LAB | Facility: MEDICAL CENTER | Age: 65
End: 2025-02-10

## 2025-02-10 ENCOUNTER — ANTICOAGULATION MONITORING (OUTPATIENT)
Dept: VASCULAR LAB | Facility: MEDICAL CENTER | Age: 65
End: 2025-02-10
Payer: MEDICARE

## 2025-02-10 DIAGNOSIS — I82.409 DEEP VEIN THROMBOSIS (DVT) OF LOWER EXTREMITY, UNSPECIFIED CHRONICITY, UNSPECIFIED LATERALITY, UNSPECIFIED VEIN (HCC): ICD-10-CM

## 2025-02-10 LAB — INR PPP: 2.3 (ref 2–3.5)

## 2025-02-10 PROCEDURE — 99213 OFFICE O/P EST LOW 20 MIN: CPT | Performed by: NURSE PRACTITIONER

## 2025-02-10 NOTE — PROGRESS NOTES
[facility] Tele[facility] Service Note    02/10/25    This evaluation was conducted via telemedicine visit using secure and encrypted videoconferencing technology. The patient was physically located at [facility] in [place], [place]. The patient was presented by [facility] MELVIN Polo. The patient's identity was confirmed and verbal consent for the telemedicine encounter was obtained.    Anticoagulation Summary  As of 2/10/2025      INR goal:  2.0-3.0   TTR:  84.7% (2.6 y)   INR used for dosin.30 (2/10/2025)   Warfarin maintenance plan:  7.5 mg (5 mg x 1.5) every Mon, Fri; 5 mg (5 mg x 1) all other days   Weekly warfarin total:  40 mg   Plan last modified:  PAULETTE SalasPYohanNYohan (2023)   Next INR check:  3/24/2025   Target end date:  Indefinite    Indications    Acute pulmonary embolism (HCC) (Resolved) [I26.99]  Deep vein thrombosis (HCC) [I82.409]                 Anticoagulation Episode Summary       INR check location:  --    Preferred lab:  --    Send INR reminders to:  --    Comments:  PCP has determined indefinite therapy for unprovoked DVT/PE  Possible Eliquis failure (PE a few days after DVT and starting Eliquis)                Refer to Patient Findings for HPI:  Patient Findings       Negatives:  Signs/symptoms of thrombosis, Signs/symptoms of bleeding, Laboratory test error suspected, Change in health, Change in alcohol use, Change in activity, Upcoming invasive procedure, Emergency department visit, Upcoming dental procedure, Missed doses, Extra doses, Change in medications, Change in diet/appetite, Hospital admission, Bruising, Other complaints            There were no vitals filed for this visit.    Verified current warfarin dosing schedule.    Pt is not on antiplatelet therapy.    Review of Systems   HENT: Negative for  nosebleeds.   Respiratory: Negative for shortness of breath.  Gastrointestinal: Negative for blood in stool.   Genitourinary: Negative for hematuria.   Psychiatric/Behavioral: The patient is not nervous/anxious.     Physical Exam   Constitutional: Oriented to person, place, and time. Appears well-developed and well-nourished.   Pulmonary/Chest: Effort normal. No respiratory distress.   Skin: Not diaphoretic.  Psychiatric: Normal mood and affect. Behavior is normal.    A/P   INR is therapeutic.    Warfarin dosing recommendation: Continue 7.5 mg Mon/Fri, 5 mg AODs.    Follow up appointment in 6 week(s).    Next Appointment: Monday, March 24, 2025 at 9:45 am.     Pt educated to contact our clinic with any changes in medications or s/s of bleeding or thrombosis. Pt is aware to seek immediate medical attention for falls, head injury or deep cuts. Review all of your home medications and newly ordered medications with your doctor and / or pharmacist. Follow medication instructions as directed by your doctor and / or pharmacist. Please keep your medication list with you and share with your physician. Update the information when medications are discontinued, doses are changed, or new medications (including over-the-counter products) are added; and carry medication information at all times in the event of emergency situations.    CHEST guidelines recommend frequent INR monitoring at regular intervals (a few days up to a max of 12 weeks) to ensure they are on the proper dose of warfarin and not having any complications from therapy.  INRs can dramatically change over a short time period due to diet, medications, and medical conditions.   The patient is instructed to go to the ER for falls with a head injury, blood in urine or stool or any bleeding that last longer than 20 min.   For questions, please contact Outpatient Anticoagulation Service [phone].         PAULA Rivera.  [facility]  [phone]

## 2025-03-24 ENCOUNTER — ANTICOAGULATION MONITORING (OUTPATIENT)
Dept: VASCULAR LAB | Facility: MEDICAL CENTER | Age: 65
End: 2025-03-24
Payer: MEDICARE

## 2025-03-24 ENCOUNTER — APPOINTMENT (OUTPATIENT)
Dept: VASCULAR LAB | Facility: MEDICAL CENTER | Age: 65
End: 2025-03-24

## 2025-03-24 VITALS — DIASTOLIC BLOOD PRESSURE: 67 MMHG | OXYGEN SATURATION: 96 % | SYSTOLIC BLOOD PRESSURE: 131 MMHG | HEART RATE: 63 BPM

## 2025-03-24 DIAGNOSIS — I82.409 DEEP VEIN THROMBOSIS (DVT) OF LOWER EXTREMITY, UNSPECIFIED CHRONICITY, UNSPECIFIED LATERALITY, UNSPECIFIED VEIN (HCC): ICD-10-CM

## 2025-03-24 LAB — INR PPP: 2.6 (ref 2–3.5)

## 2025-03-24 PROCEDURE — 3075F SYST BP GE 130 - 139MM HG: CPT | Performed by: NURSE PRACTITIONER

## 2025-03-24 PROCEDURE — 3078F DIAST BP <80 MM HG: CPT | Performed by: NURSE PRACTITIONER

## 2025-03-24 PROCEDURE — 93793 ANTICOAG MGMT PT WARFARIN: CPT | Performed by: NURSE PRACTITIONER

## 2025-03-24 PROCEDURE — 85610 PROTHROMBIN TIME: CPT | Performed by: NURSE PRACTITIONER

## 2025-03-24 NOTE — PROGRESS NOTES
[facility] Tele[facility]ticoagulation Service Note    25    This evaluation was conducted via telemedicine visit using secure and encrypted videoconferencing technology. The patient was physically located at [facility] in [place], [place]. The patient was presented by [facility] MELVIN Sales. The patient's identity was confirmed and verbal consent for the telemedicine encounter was obtained.    Anticoagulation Summary  As of 3/24/2025      INR goal:  2.0-3.0   TTR:  85.4% (2.7 y)   INR used for dosin.60 (3/24/2025)   Warfarin maintenance plan:  7.5 mg (5 mg x 1.5) every Mon, Fri; 5 mg (5 mg x 1) all other days   Weekly warfarin total:  40 mg   Plan last modified:  Silvia Smalls AYohanPYohanNYohan (2023)   Next INR check:  2025   Target end date:  Indefinite    Indications    Acute pulmonary embolism (HCC) (Resolved) [I26.99]  Deep vein thrombosis (HCC) [I82.409]                 Anticoagulation Episode Summary       INR check location:  --    Preferred lab:  --    Send INR reminders to:  --    Comments:  PCP has determined indefinite therapy for unprovoked DVT/PE  Possible Eliquis failure (PE a few days after DVT and starting Eliquis)                Refer to Patient Findings for HPI:  Patient Findings       Negatives:  Signs/symptoms of thrombosis, Signs/symptoms of bleeding, Laboratory test error suspected, Change in health, Change in alcohol use, Change in activity, Upcoming invasive procedure, Emergency department visit, Upcoming dental procedure, Missed doses, Extra doses, Change in medications, Change in diet/appetite, Hospital admission, Bruising, Other complaints            Vitals:    25 0939   BP: 131/67   Pulse: 63   SpO2: 96%       Verified current warfarin dosing schedule.    Pt is not on antiplatelet therapy.    Review of Systems    HENT: Negative for nosebleeds.   Respiratory: Negative for shortness of breath.  Gastrointestinal: Negative for blood in stool.   Genitourinary: Negative for hematuria.   Psychiatric/Behavioral: The patient is not nervous/anxious.     Physical Exam   Constitutional: Oriented to person, place, and time. Appears well-developed and well-nourished.   Pulmonary/Chest: Effort normal. No respiratory distress.   Skin: Not diaphoretic.  Psychiatric: Normal mood and affect. Behavior is normal.    A/P   INR is therapeutic.    Warfarin dosing recommendation: Continue taking 7.5 mg Mondays/Fridays, 5 mg AODs.    Follow up appointment in 8 week(s).    Next Appointment: Monday, May 19, 2025 at 9:45 am.     Pt educated to contact our clinic with any changes in medications or s/s of bleeding or thrombosis. Pt is aware to seek immediate medical attention for falls, head injury or deep cuts. Review all of your home medications and newly ordered medications with your doctor and / or pharmacist. Follow medication instructions as directed by your doctor and / or pharmacist. Please keep your medication list with you and share with your physician. Update the information when medications are discontinued, doses are changed, or new medications (including over-the-counter products) are added; and carry medication information at all times in the event of emergency situations.    CHEST guidelines recommend frequent INR monitoring at regular intervals (a few days up to a max of 12 weeks) to ensure they are on the proper dose of warfarin and not having any complications from therapy.  INRs can dramatically change over a short time period due to diet, medications, and medical conditions.   The patient is instructed to go to the ER for falls with a head injury, blood in urine or stool or any bleeding that last longer than 20 min.   For questions, please contact Outpatient Anticoagulation Service [phone].         Nina Dewitt,  PAULA.  [facility]  [phone]

## 2025-05-19 ENCOUNTER — ANTICOAGULATION MONITORING (OUTPATIENT)
Dept: VASCULAR LAB | Facility: MEDICAL CENTER | Age: 65
End: 2025-05-19
Payer: MEDICARE

## 2025-05-19 ENCOUNTER — APPOINTMENT (OUTPATIENT)
Dept: VASCULAR LAB | Facility: MEDICAL CENTER | Age: 65
End: 2025-05-19
Payer: MEDICARE

## 2025-05-19 VITALS — HEART RATE: 70 BPM | SYSTOLIC BLOOD PRESSURE: 142 MMHG | DIASTOLIC BLOOD PRESSURE: 73 MMHG | OXYGEN SATURATION: 97 %

## 2025-05-19 DIAGNOSIS — I82.409 DEEP VEIN THROMBOSIS (DVT) OF LOWER EXTREMITY, UNSPECIFIED CHRONICITY, UNSPECIFIED LATERALITY, UNSPECIFIED VEIN (HCC): Primary | ICD-10-CM

## 2025-05-19 LAB — INR PPP: 2.7 (ref 2–3.5)

## 2025-05-19 PROCEDURE — 3078F DIAST BP <80 MM HG: CPT | Performed by: NURSE PRACTITIONER

## 2025-05-19 PROCEDURE — 99213 OFFICE O/P EST LOW 20 MIN: CPT | Mod: 95 | Performed by: NURSE PRACTITIONER

## 2025-05-19 PROCEDURE — 3077F SYST BP >= 140 MM HG: CPT | Performed by: NURSE PRACTITIONER

## 2025-05-19 RX ORDER — WARFARIN SODIUM 5 MG/1
TABLET ORAL
Qty: 135 TABLET | Refills: 1 | Status: SHIPPED | OUTPATIENT
Start: 2025-05-19

## 2025-05-19 NOTE — PROGRESS NOTES
[facility] Tele[facility]agulation Service Note    05/19/25    This evaluation was conducted via telemedicine visit using secure and encrypted videoconferencing technology. The patient was physically located at [facility] [place] in [place], [place]. The patient was presented by [facility] MELVIN Polo. The patient's identity was confirmed and verbal consent for the telemedicine encounter was obtained.    Date Referral Placed: 1/8/25    Anticoagulation Summary  As of 5/19/2025      INR goal:  2.0-3.0   TTR:  85.4% (2.7 y)   INR used for dosing:  --   Warfarin maintenance plan:  7.5 mg (5 mg x 1.5) every Mon, Fri; 5 mg (5 mg x 1) all other days   Weekly warfarin total:  40 mg   Plan last modified:  PAULETTE SalasPYohanNYohan (1/23/2023)   Next INR check:  --   Target end date:  Indefinite    Indications    Acute pulmonary embolism (HCC) (Resolved) [I26.99]  Deep vein thrombosis (HCC) [I82.409]                 Anticoagulation Episode Summary       INR check location:  --    Preferred lab:  --    Send INR reminders to:  --    Comments:  PCP has determined indefinite therapy for unprovoked DVT/PE  Possible Eliquis failure (PE a few days after DVT and starting Eliquis)                Refer to Patient Findings for HPI:  Patient Findings       Negatives:  Signs/symptoms of thrombosis, Signs/symptoms of bleeding, Laboratory test error suspected, Change in health, Change in alcohol use, Change in activity, Upcoming invasive procedure, Emergency department visit, Upcoming dental procedure, Missed doses, Extra doses, Change in medications, Change in diet/appetite, Hospital admission, Bruising, Other complaints            Vitals:    05/19/25 0938   BP: (!) 142/73   Pulse: 70   SpO2: 97%       Verified current warfarin dosing schedule.    Pt is not on antiplatelet  therapy.    Review of Systems   HENT: Negative for nosebleeds.   Respiratory: Negative for shortness of breath.  Gastrointestinal: Negative for blood in stool.   Genitourinary: Negative for hematuria.   Psychiatric/Behavioral: The patient is not nervous/anxious.     Physical Exam   Constitutional: Oriented to person, place, and time. Appears well-developed and well-nourished.   Pulmonary/Chest: Effort normal. No respiratory distress.   Skin: Not diaphoretic.  Psychiatric: Normal mood and affect. Behavior is normal.    A/P   INR is therapeutic  Reason(s) for out of range INR today: N/A      Warfarin dosing recommendation: Continue 7.5 mg Mondays/Fridays, 5 mg AODs.    Request pt to return in 8 week(s). Pt agrees.    Next Appointment: Monday, July 17, 2025 at 9:45 am.     Pt educated to contact our clinic with any changes in medications or s/s of bleeding or thrombosis. Pt is aware to seek immediate medical attention for falls, head injury or deep cuts. Review all of your home medications and newly ordered medications with your doctor and / or pharmacist. Follow medication instructions as directed by your doctor and / or pharmacist. Please keep your medication list with you and share with your physician. Update the information when medications are discontinued, doses are changed, or new medications (including over-the-counter products) are added; and carry medication information at all times in the event of emergency situations.    CHEST guidelines recommend frequent INR monitoring at regular intervals (a few days up to a max of 12 weeks) to ensure they are on the proper dose of warfarin and not having any complications from therapy.  INRs can dramatically change over a short time period due to diet, medications, and medical conditions.   The patient is instructed to go to the ER for falls with a head injury, blood in urine or stool or any bleeding that last longer than 20 min.   For questions, please contact  Outpatient Anticoagulation Service [phone].         PAULA Rivera.  [facility]  [phone]

## 2025-07-10 ENCOUNTER — APPOINTMENT (OUTPATIENT)
Dept: URBAN - METROPOLITAN AREA CLINIC 35 | Facility: CLINIC | Age: 65
Setting detail: DERMATOLOGY
End: 2025-07-10

## 2025-07-10 DIAGNOSIS — B35.4 TINEA CORPORIS: ICD-10-CM | Status: WELL CONTROLLED

## 2025-07-10 DIAGNOSIS — D22 MELANOCYTIC NEVI: ICD-10-CM

## 2025-07-10 DIAGNOSIS — L81.4 OTHER MELANIN HYPERPIGMENTATION: ICD-10-CM

## 2025-07-10 DIAGNOSIS — D18.0 HEMANGIOMA: ICD-10-CM

## 2025-07-10 DIAGNOSIS — L57.0 ACTINIC KERATOSIS: ICD-10-CM

## 2025-07-10 DIAGNOSIS — L20.9 ATOPIC DERMATITIS, UNSPECIFIED: ICD-10-CM | Status: WELL CONTROLLED

## 2025-07-10 DIAGNOSIS — L82.1 OTHER SEBORRHEIC KERATOSIS: ICD-10-CM

## 2025-07-10 DIAGNOSIS — Z85.828 PERSONAL HISTORY OF OTHER MALIGNANT NEOPLASM OF SKIN: ICD-10-CM

## 2025-07-10 DIAGNOSIS — Z71.89 OTHER SPECIFIED COUNSELING: ICD-10-CM

## 2025-07-10 PROBLEM — D22.5 MELANOCYTIC NEVI OF TRUNK: Status: ACTIVE | Noted: 2025-07-10

## 2025-07-10 PROBLEM — D18.01 HEMANGIOMA OF SKIN AND SUBCUTANEOUS TISSUE: Status: ACTIVE | Noted: 2025-07-10

## 2025-07-10 PROCEDURE — ? COUNSELING

## 2025-07-10 PROCEDURE — ? TREATMENT REGIMEN

## 2025-07-10 PROCEDURE — ? PRESCRIPTION

## 2025-07-10 PROCEDURE — ? KOH PREP

## 2025-07-10 PROCEDURE — ? LIQUID NITROGEN

## 2025-07-10 RX ORDER — ALCLOMETASONE DIPROPIONATE 0.5 MG/G
THIN LAYER CREAM TOPICAL BID
Qty: 60 | Refills: 0 | Status: ERX

## 2025-07-10 RX ORDER — CLOBETASOL PROPIONATE 0.5 MG/G
THIN LAYER CREAM TOPICAL BID
Qty: 60 | Refills: 1 | Status: ERX

## 2025-07-10 RX ORDER — KETOCONAZOLE 20 MG/G
THIN LAYER CREAM TOPICAL BID
Qty: 60 | Refills: 11 | Status: ERX

## 2025-07-10 ASSESSMENT — LOCATION SIMPLE DESCRIPTION DERM
LOCATION SIMPLE: RIGHT SHOULDER
LOCATION SIMPLE: LEFT UPPER BACK
LOCATION SIMPLE: LEFT CALF
LOCATION SIMPLE: POSTERIOR NECK
LOCATION SIMPLE: RIGHT FOREHEAD
LOCATION SIMPLE: LOWER BACK
LOCATION SIMPLE: RIGHT CALF
LOCATION SIMPLE: RIGHT HAND
LOCATION SIMPLE: RIGHT LOWER BACK
LOCATION SIMPLE: LEFT SHOULDER
LOCATION SIMPLE: ABDOMEN

## 2025-07-10 ASSESSMENT — LOCATION DETAILED DESCRIPTION DERM
LOCATION DETAILED: RIGHT LATERAL SHOULDER
LOCATION DETAILED: LEFT MEDIAL UPPER BACK
LOCATION DETAILED: SUPERIOR LUMBAR SPINE
LOCATION DETAILED: RIGHT POSTERIOR NECK
LOCATION DETAILED: EPIGASTRIC SKIN
LOCATION DETAILED: RIGHT RADIAL DORSAL HAND
LOCATION DETAILED: LEFT POSTERIOR SHOULDER
LOCATION DETAILED: RIGHT FOREHEAD
LOCATION DETAILED: RIGHT INFERIOR MEDIAL LOWER BACK
LOCATION DETAILED: LEFT DISTAL CALF
LOCATION DETAILED: RIGHT DISTAL CALF

## 2025-07-10 ASSESSMENT — LOCATION ZONE DERM
LOCATION ZONE: FACE
LOCATION ZONE: NECK
LOCATION ZONE: HAND
LOCATION ZONE: TRUNK
LOCATION ZONE: LEG
LOCATION ZONE: ARM

## 2025-07-14 ENCOUNTER — ANTICOAGULATION MONITORING (OUTPATIENT)
Dept: VASCULAR LAB | Facility: MEDICAL CENTER | Age: 65
End: 2025-07-14
Payer: MEDICARE

## 2025-07-14 ENCOUNTER — APPOINTMENT (OUTPATIENT)
Dept: VASCULAR LAB | Facility: MEDICAL CENTER | Age: 65
End: 2025-07-14
Payer: MEDICARE

## 2025-07-14 VITALS — OXYGEN SATURATION: 96 % | DIASTOLIC BLOOD PRESSURE: 67 MMHG | HEART RATE: 61 BPM | SYSTOLIC BLOOD PRESSURE: 116 MMHG

## 2025-07-14 DIAGNOSIS — I82.409 DEEP VEIN THROMBOSIS (DVT) OF LOWER EXTREMITY, UNSPECIFIED CHRONICITY, UNSPECIFIED LATERALITY, UNSPECIFIED VEIN (HCC): Primary | ICD-10-CM

## 2025-07-14 LAB — INR PPP: 2.2 (ref 2–3.5)

## 2025-07-14 PROCEDURE — 99213 OFFICE O/P EST LOW 20 MIN: CPT | Mod: 95 | Performed by: NURSE PRACTITIONER

## 2025-07-14 NOTE — PROGRESS NOTES
[facility] Tele[facility]ticoagulation Service Note    25    This evaluation was conducted via telemedicine visit using secure and encrypted videoconferencing technology. The patient was physically located at [facility] in [place], [place]. The patient was presented by [facility] MELVIN Polo. The patient's identity was confirmed and verbal consent for the telemedicine encounter was obtained.    Date Referral Placed: 25    Anticoagulation Summary  As of 2025      INR goal:  2.0-3.0   TTR:  86.9% (3 y)   INR used for dosin.20 (2025)   Warfarin maintenance plan:  7.5 mg (5 mg x 1.5) every Mon, Fri; 5 mg (5 mg x 1) all other days   Weekly warfarin total:  40 mg   Plan last modified:  VALORIE Salas (2023)   Next INR check:  9/15/2025   Target end date:  Indefinite    Indications    Acute pulmonary embolism (HCC) (Resolved) [I26.99]  Deep vein thrombosis (HCC) [I82.409]                 Anticoagulation Episode Summary       INR check location:  --    Preferred lab:  --    Send INR reminders to:  --    Comments:  PCP has determined indefinite therapy for unprovoked DVT/PE  Possible Eliquis failure (PE a few days after DVT and starting Eliquis)                Refer to Patient Findings for HPI:      Vitals:    25 0930   BP: 116/67   Pulse: 61   SpO2: 96%       Verified current warfarin dosing schedule.    Pt is not on antiplatelet therapy.    Review of Systems   HENT: Negative for nosebleeds.   Respiratory: Negative for shortness of breath.  Gastrointestinal: Negative for blood in stool.   Genitourinary: Negative for hematuria.   Psychiatric/Behavioral: The patient is not nervous/anxious.     Physical Exam   Constitutional: Oriented to person, place, and time. Appears well-developed and well-nourished.   Pulmonary/Chest:  Effort normal. No respiratory distress.   Skin: Not diaphoretic.  Psychiatric: Normal mood and affect. Behavior is normal.    A/P   INR is therapeutic  Reason(s) for out of range INR today: N/A        Warfarin dosing recommendation: Continue regimen as listed above.    Request pt to return in 9 week(s). Pt agrees.    Next Appointment: Monday, September 15, 2025 at 9:45 am.    Pt educated to contact our clinic with any changes in medications or s/s of bleeding or thrombosis. Pt is aware to seek immediate medical attention for falls, head injury or deep cuts. Review all of your home medications and newly ordered medications with your doctor and / or pharmacist. Follow medication instructions as directed by your doctor and / or pharmacist. Please keep your medication list with you and share with your physician. Update the information when medications are discontinued, doses are changed, or new medications (including over-the-counter products) are added; and carry medication information at all times in the event of emergency situations.    CHEST guidelines recommend frequent INR monitoring at regular intervals (a few days up to a max of 12 weeks) to ensure they are on the proper dose of warfarin and not having any complications from therapy.  INRs can dramatically change over a short time period due to diet, medications, and medical conditions.   The patient is instructed to go to the ER for falls with a head injury, blood in urine or stool or any bleeding that last longer than 20 min.   For questions, please contact Outpatient Anticoagulation Service [phone].         PAULA Rivera.  [facility]  [phone]